# Patient Record
Sex: MALE | Race: WHITE | NOT HISPANIC OR LATINO | Employment: FULL TIME | URBAN - NONMETROPOLITAN AREA
[De-identification: names, ages, dates, MRNs, and addresses within clinical notes are randomized per-mention and may not be internally consistent; named-entity substitution may affect disease eponyms.]

---

## 2017-11-02 PROCEDURE — 87081 CULTURE SCREEN ONLY: CPT | Performed by: FAMILY MEDICINE

## 2018-06-12 ENCOUNTER — LAB (OUTPATIENT)
Dept: LAB | Facility: HOSPITAL | Age: 28
End: 2018-06-12

## 2018-06-12 ENCOUNTER — TRANSCRIBE ORDERS (OUTPATIENT)
Dept: ADMINISTRATIVE | Facility: HOSPITAL | Age: 28
End: 2018-06-12

## 2018-06-12 DIAGNOSIS — Z00.00 ROUTINE GENERAL MEDICAL EXAMINATION AT A HEALTH CARE FACILITY: ICD-10-CM

## 2018-06-12 DIAGNOSIS — Z00.00 ROUTINE GENERAL MEDICAL EXAMINATION AT A HEALTH CARE FACILITY: Primary | ICD-10-CM

## 2018-06-12 LAB
25(OH)D3 SERPL-MCNC: 22.7 NG/ML (ref 30–100)
ALBUMIN SERPL-MCNC: 4.5 G/DL (ref 3.5–5)
ALBUMIN/GLOB SERPL: 1.5 G/DL (ref 1.1–2.5)
ALP SERPL-CCNC: 58 U/L (ref 24–120)
ALT SERPL W P-5'-P-CCNC: 107 U/L (ref 0–54)
ANION GAP SERPL CALCULATED.3IONS-SCNC: 9 MMOL/L (ref 4–13)
AST SERPL-CCNC: 54 U/L (ref 7–45)
AUTO MIXED CELLS #: 0.8 10*3/MM3 (ref 0.1–2.6)
AUTO MIXED CELLS %: 10.1 % (ref 0.1–24)
BILIRUB SERPL-MCNC: 0.5 MG/DL (ref 0.1–1)
BILIRUB UR QL STRIP: NEGATIVE
BUN BLD-MCNC: 16 MG/DL (ref 5–21)
BUN/CREAT SERPL: 17.8
CALCIUM SPEC-SCNC: 9.6 MG/DL (ref 8.4–10.4)
CHLORIDE SERPL-SCNC: 105 MMOL/L (ref 98–110)
CHOLEST SERPL-MCNC: 191 MG/DL (ref 130–200)
CLARITY UR: CLEAR
CO2 SERPL-SCNC: 28 MMOL/L (ref 24–31)
COLOR UR: YELLOW
CREAT BLD-MCNC: 0.9 MG/DL (ref 0.5–1.4)
ERYTHROCYTE [DISTWIDTH] IN BLOOD BY AUTOMATED COUNT: 12.7 % (ref 12–15)
GFR SERPL CREATININE-BSD FRML MDRD: 101 ML/MIN/1.73
GLOBULIN UR ELPH-MCNC: 3 GM/DL
GLUCOSE BLD-MCNC: 100 MG/DL (ref 70–100)
GLUCOSE UR STRIP-MCNC: NEGATIVE MG/DL
HBA1C MFR BLD: 5.6 %
HCT VFR BLD AUTO: 45 % (ref 40–52)
HDLC SERPL-MCNC: 38 MG/DL
HGB BLD-MCNC: 15.6 G/DL (ref 14–18)
HGB UR QL STRIP.AUTO: NEGATIVE
KETONES UR QL STRIP: NEGATIVE
LDLC SERPL CALC-MCNC: 107 MG/DL (ref 0–99)
LDLC/HDLC SERPL: 2.82 {RATIO}
LEUKOCYTE ESTERASE UR QL STRIP.AUTO: NEGATIVE
LYMPHOCYTES # BLD AUTO: 3.4 10*3/MM3 (ref 0.8–7)
LYMPHOCYTES NFR BLD AUTO: 41.5 % (ref 15–45)
MCH RBC QN AUTO: 29.8 PG (ref 28–32)
MCHC RBC AUTO-ENTMCNC: 34.7 G/DL (ref 33–36)
MCV RBC AUTO: 86 FL (ref 82–95)
NEUTROPHILS # BLD AUTO: 3.9 10*3/MM3 (ref 1.5–8.3)
NEUTROPHILS NFR BLD AUTO: 48.4 % (ref 39–78)
NITRITE UR QL STRIP: NEGATIVE
PH UR STRIP.AUTO: 7 [PH] (ref 5–8)
PLATELET # BLD AUTO: 279 10*3/MM3 (ref 130–400)
PMV BLD AUTO: 9.3 FL (ref 6–12)
POTASSIUM BLD-SCNC: 4.6 MMOL/L (ref 3.5–5.3)
PROT SERPL-MCNC: 7.5 G/DL (ref 6.3–8.7)
PROT UR QL STRIP: NEGATIVE
RBC # BLD AUTO: 5.23 10*6/MM3 (ref 4.2–5.4)
SODIUM BLD-SCNC: 142 MMOL/L (ref 135–145)
SP GR UR STRIP: 1.02 (ref 1–1.03)
TRIGL SERPL-MCNC: 230 MG/DL (ref 0–149)
TSH SERPL DL<=0.05 MIU/L-ACNC: 1.27 MIU/ML (ref 0.47–4.68)
UROBILINOGEN UR QL STRIP: NORMAL
VLDLC SERPL-MCNC: 46 MG/DL
WBC NRBC COR # BLD: 8.1 10*3/MM3 (ref 4.8–10.8)

## 2018-06-12 PROCEDURE — 80053 COMPREHEN METABOLIC PANEL: CPT | Performed by: NURSE PRACTITIONER

## 2018-06-12 PROCEDURE — 36415 COLL VENOUS BLD VENIPUNCTURE: CPT

## 2018-06-12 PROCEDURE — 81003 URINALYSIS AUTO W/O SCOPE: CPT

## 2018-06-12 PROCEDURE — 80061 LIPID PANEL: CPT

## 2018-06-12 PROCEDURE — 85025 COMPLETE CBC W/AUTO DIFF WBC: CPT | Performed by: NURSE PRACTITIONER

## 2018-06-12 PROCEDURE — 83036 HEMOGLOBIN GLYCOSYLATED A1C: CPT

## 2018-06-12 PROCEDURE — 84443 ASSAY THYROID STIM HORMONE: CPT | Performed by: NURSE PRACTITIONER

## 2018-06-12 PROCEDURE — 82306 VITAMIN D 25 HYDROXY: CPT | Performed by: NURSE PRACTITIONER

## 2018-08-12 ENCOUNTER — APPOINTMENT (OUTPATIENT)
Dept: CT IMAGING | Age: 28
End: 2018-08-12

## 2018-08-12 ENCOUNTER — HOSPITAL ENCOUNTER (EMERGENCY)
Age: 28
Discharge: HOME | End: 2018-08-12
Attending: EMERGENCY MEDICINE

## 2018-08-12 VITALS
HEIGHT: 72 IN | DIASTOLIC BLOOD PRESSURE: 81 MMHG | RESPIRATION RATE: 17 BRPM | BODY MASS INDEX: 39.28 KG/M2 | SYSTOLIC BLOOD PRESSURE: 132 MMHG | HEART RATE: 89 BPM | OXYGEN SATURATION: 97 % | TEMPERATURE: 98.3 F | WEIGHT: 290 LBS

## 2018-08-12 DIAGNOSIS — S09.90XA INJURY OF HEAD, INITIAL ENCOUNTER: ICD-10-CM

## 2018-08-12 DIAGNOSIS — S01.81XA FACIAL LACERATION, INITIAL ENCOUNTER: Primary | ICD-10-CM
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
## 2018-08-12 PROCEDURE — 99284 EMERGENCY DEPT VISIT MOD MDM: CPT | Performed by: EMERGENCY MEDICINE

## 2018-08-12 PROCEDURE — 12011 RPR F/E/E/N/L/M 2.5 CM/<: CPT

## 2018-08-12 PROCEDURE — 12011 RPR F/E/E/N/L/M 2.5 CM/<: CPT | Performed by: EMERGENCY MEDICINE

## 2018-08-12 PROCEDURE — 99283 EMERGENCY DEPT VISIT LOW MDM: CPT

## 2018-08-12 PROCEDURE — 2500000003 HC RX 250 WO HCPCS: Performed by: EMERGENCY MEDICINE

## 2018-08-12 PROCEDURE — 6360000002 HC RX W HCPCS: Performed by: EMERGENCY MEDICINE

## 2018-08-12 PROCEDURE — 70450 CT HEAD/BRAIN W/O DYE: CPT

## 2018-08-12 RX ORDER — LIDOCAINE HYDROCHLORIDE 10 MG/ML
5 INJECTION, SOLUTION INFILTRATION; PERINEURAL ONCE
Status: COMPLETED | OUTPATIENT
Start: 2018-08-12 | End: 2018-08-12

## 2018-08-12 RX ORDER — ONDANSETRON 4 MG/1
4 TABLET, ORALLY DISINTEGRATING ORAL ONCE
Status: COMPLETED | OUTPATIENT
Start: 2018-08-12 | End: 2018-08-12

## 2018-08-12 RX ADMIN — ONDANSETRON 4 MG: 4 TABLET, ORALLY DISINTEGRATING ORAL at 05:29

## 2018-08-12 RX ADMIN — LIDOCAINE HYDROCHLORIDE 5 ML: 10 INJECTION, SOLUTION INFILTRATION; PERINEURAL at 05:28

## 2018-08-12 ASSESSMENT — ENCOUNTER SYMPTOMS
NAUSEA: 1
BACK PAIN: 0
SHORTNESS OF BREATH: 0

## 2018-08-12 ASSESSMENT — PAIN SCALES - GENERAL
PAINLEVEL_OUTOF10: 3
PAINLEVEL_OUTOF10: 3
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
## 2019-07-12 ENCOUNTER — APPOINTMENT (OUTPATIENT)
Dept: GENERAL RADIOLOGY | Age: 29
End: 2019-07-12

## 2019-07-12 ENCOUNTER — HOSPITAL ENCOUNTER (EMERGENCY)
Age: 29
Discharge: HOME | End: 2019-07-12
Attending: FAMILY MEDICINE

## 2019-07-12 VITALS
HEIGHT: 72 IN | OXYGEN SATURATION: 100 % | TEMPERATURE: 98.6 F | SYSTOLIC BLOOD PRESSURE: 135 MMHG | RESPIRATION RATE: 18 BRPM | HEART RATE: 83 BPM | WEIGHT: 285 LBS | DIASTOLIC BLOOD PRESSURE: 82 MMHG | BODY MASS INDEX: 38.6 KG/M2

## 2019-07-12 DIAGNOSIS — R09.1 PLEURISY: Primary | ICD-10-CM

## 2019-07-12 DIAGNOSIS — L40.9 PSORIASIS: ICD-10-CM

## 2019-07-12 LAB
ALBUMIN SERPL-MCNC: 4.4 G/DL (ref 3.5–5.2)
ALP BLD-CCNC: 74 U/L (ref 40–130)
ALT SERPL-CCNC: 52 U/L (ref 5–41)
ANION GAP SERPL CALCULATED.3IONS-SCNC: 12 MMOL/L (ref 7–19)
AST SERPL-CCNC: 29 U/L (ref 5–40)
BACTERIA: NEGATIVE /HPF
BASOPHILS ABSOLUTE: 0.1 K/UL (ref 0–0.2)
BASOPHILS RELATIVE PERCENT: 0.6 % (ref 0–1)
BILIRUB SERPL-MCNC: 0.6 MG/DL (ref 0.2–1.2)
BILIRUBIN URINE: NEGATIVE
BLOOD, URINE: NEGATIVE
BUN BLDV-MCNC: 14 MG/DL (ref 6–20)
C-REACTIVE PROTEIN: 0.69 MG/DL (ref 0–0.5)
CALCIUM SERPL-MCNC: 9.4 MG/DL (ref 8.6–10)
CHLORIDE BLD-SCNC: 103 MMOL/L (ref 98–111)
CLARITY: ABNORMAL
CO2: 24 MMOL/L (ref 22–29)
COLOR: YELLOW
CREAT SERPL-MCNC: 0.8 MG/DL (ref 0.5–1.2)
EOSINOPHILS ABSOLUTE: 0.3 K/UL (ref 0–0.6)
EOSINOPHILS RELATIVE PERCENT: 3.4 % (ref 0–5)
EPITHELIAL CELLS, UA: 2 /HPF (ref 0–5)
GFR NON-AFRICAN AMERICAN: >60
GLUCOSE BLD-MCNC: 112 MG/DL (ref 74–109)
GLUCOSE URINE: NEGATIVE MG/DL
HCT VFR BLD CALC: 46.1 % (ref 42–52)
HEMOGLOBIN: 15.4 G/DL (ref 14–18)
HYALINE CASTS: 14 /HPF (ref 0–8)
KETONES, URINE: NEGATIVE MG/DL
LEUKOCYTE ESTERASE, URINE: ABNORMAL
LYMPHOCYTES ABSOLUTE: 3.5 K/UL (ref 1.1–4.5)
LYMPHOCYTES RELATIVE PERCENT: 39.4 % (ref 20–40)
MCH RBC QN AUTO: 29 PG (ref 27–31)
MCHC RBC AUTO-ENTMCNC: 33.4 G/DL (ref 33–37)
MCV RBC AUTO: 86.8 FL (ref 80–94)
MONOCYTES ABSOLUTE: 0.7 K/UL (ref 0–0.9)
MONOCYTES RELATIVE PERCENT: 7.8 % (ref 0–10)
NEUTROPHILS ABSOLUTE: 4.3 K/UL (ref 1.5–7.5)
NEUTROPHILS RELATIVE PERCENT: 48.5 % (ref 50–65)
NITRITE, URINE: NEGATIVE
PDW BLD-RTO: 12.9 % (ref 11.5–14.5)
PH UA: 6 (ref 5–8)
PLATELET # BLD: 313 K/UL (ref 130–400)
PMV BLD AUTO: 9.8 FL (ref 9.4–12.4)
POTASSIUM SERPL-SCNC: 3.9 MMOL/L (ref 3.5–5)
PROTEIN UA: NEGATIVE MG/DL
RBC # BLD: 5.31 M/UL (ref 4.7–6.1)
RBC UA: 1 /HPF (ref 0–4)
SODIUM BLD-SCNC: 139 MMOL/L (ref 136–145)
SPECIFIC GRAVITY UA: 1.03 (ref 1–1.03)
T4 TOTAL: 7.7 UG/DL (ref 4.5–11.7)
TOTAL PROTEIN: 7.1 G/DL (ref 6.6–8.7)
TROPONIN: <0.01 NG/ML (ref 0–0.03)
TROPONIN: <0.01 NG/ML (ref 0–0.03)
TSH SERPL DL<=0.05 MIU/L-ACNC: 1.1 UIU/ML (ref 0.27–4.2)
URINE REFLEX TO CULTURE: YES
UROBILINOGEN, URINE: 0.2 E.U./DL
WBC # BLD: 8.9 K/UL (ref 4.8–10.8)
WBC UA: 8 /HPF (ref 0–5)

## 2019-07-12 PROCEDURE — 84484 ASSAY OF TROPONIN QUANT: CPT

## 2019-07-12 PROCEDURE — 99284 EMERGENCY DEPT VISIT MOD MDM: CPT

## 2019-07-12 PROCEDURE — 99285 EMERGENCY DEPT VISIT HI MDM: CPT | Performed by: FAMILY MEDICINE

## 2019-07-12 PROCEDURE — 84443 ASSAY THYROID STIM HORMONE: CPT

## 2019-07-12 PROCEDURE — 80053 COMPREHEN METABOLIC PANEL: CPT

## 2019-07-12 PROCEDURE — 81001 URINALYSIS AUTO W/SCOPE: CPT

## 2019-07-12 PROCEDURE — 87086 URINE CULTURE/COLONY COUNT: CPT

## 2019-07-12 PROCEDURE — 6360000002 HC RX W HCPCS: Performed by: FAMILY MEDICINE

## 2019-07-12 PROCEDURE — 2580000003 HC RX 258: Performed by: FAMILY MEDICINE

## 2019-07-12 PROCEDURE — 96375 TX/PRO/DX INJ NEW DRUG ADDON: CPT

## 2019-07-12 PROCEDURE — 84436 ASSAY OF TOTAL THYROXINE: CPT

## 2019-07-12 PROCEDURE — 6370000000 HC RX 637 (ALT 250 FOR IP): Performed by: FAMILY MEDICINE

## 2019-07-12 PROCEDURE — 86140 C-REACTIVE PROTEIN: CPT

## 2019-07-12 PROCEDURE — 93005 ELECTROCARDIOGRAM TRACING: CPT

## 2019-07-12 PROCEDURE — 36415 COLL VENOUS BLD VENIPUNCTURE: CPT

## 2019-07-12 PROCEDURE — 85025 COMPLETE CBC W/AUTO DIFF WBC: CPT

## 2019-07-12 PROCEDURE — 71045 X-RAY EXAM CHEST 1 VIEW: CPT

## 2019-07-12 PROCEDURE — 6360000002 HC RX W HCPCS

## 2019-07-12 PROCEDURE — 96374 THER/PROPH/DIAG INJ IV PUSH: CPT

## 2019-07-12 RX ORDER — ONDANSETRON 4 MG/1
4 TABLET, ORALLY DISINTEGRATING ORAL EVERY 8 HOURS PRN
Qty: 15 TABLET | Refills: 0 | Status: SHIPPED | OUTPATIENT
Start: 2019-07-12 | End: 2020-02-24

## 2019-07-12 RX ORDER — METHYLPREDNISOLONE SODIUM SUCCINATE 125 MG/2ML
125 INJECTION, POWDER, LYOPHILIZED, FOR SOLUTION INTRAMUSCULAR; INTRAVENOUS ONCE
Status: COMPLETED | OUTPATIENT
Start: 2019-07-12 | End: 2019-07-12

## 2019-07-12 RX ORDER — ONDANSETRON 2 MG/ML
INJECTION INTRAMUSCULAR; INTRAVENOUS
Status: COMPLETED
Start: 2019-07-12 | End: 2019-07-12

## 2019-07-12 RX ORDER — SODIUM CHLORIDE 9 MG/ML
INJECTION, SOLUTION INTRAVENOUS CONTINUOUS
Status: DISCONTINUED | OUTPATIENT
Start: 2019-07-12 | End: 2019-07-12 | Stop reason: HOSPADM

## 2019-07-12 RX ORDER — METHYLPREDNISOLONE 4 MG/1
TABLET ORAL
Qty: 1 KIT | Refills: 0 | Status: SHIPPED | OUTPATIENT
Start: 2019-07-12 | End: 2020-02-24

## 2019-07-12 RX ORDER — ONDANSETRON 2 MG/ML
4 INJECTION INTRAMUSCULAR; INTRAVENOUS ONCE
Status: COMPLETED | OUTPATIENT
Start: 2019-07-12 | End: 2019-07-12

## 2019-07-12 RX ADMIN — ONDANSETRON 4 MG: 2 INJECTION INTRAMUSCULAR; INTRAVENOUS at 10:21

## 2019-07-12 RX ADMIN — LIDOCAINE HYDROCHLORIDE: 20 SOLUTION ORAL; TOPICAL at 10:14

## 2019-07-12 RX ADMIN — METHYLPREDNISOLONE SODIUM SUCCINATE 125 MG: 125 INJECTION, POWDER, FOR SOLUTION INTRAMUSCULAR; INTRAVENOUS at 10:14

## 2019-07-12 RX ADMIN — SODIUM CHLORIDE: 9 INJECTION, SOLUTION INTRAVENOUS at 10:14

## 2019-07-12 ASSESSMENT — ENCOUNTER SYMPTOMS
ABDOMINAL PAIN: 0
COUGH: 0
DIARRHEA: 0
NAUSEA: 1
BACK PAIN: 0
WHEEZING: 0
SORE THROAT: 0
VOMITING: 0
COLOR CHANGE: 0
SHORTNESS OF BREATH: 0

## 2019-07-12 ASSESSMENT — PAIN SCALES - GENERAL: PAINLEVEL_OUTOF10: 2

## 2019-07-12 NOTE — ED PROVIDER NOTES
07/12/19 1001 07/12/19 1101 07/12/19 1201   BP: 129/85 131/73 131/81 119/73   Pulse: 77 80 73 103   Resp: 18 21 19    Temp: 98.6 °F (37 °C)      TempSrc: Oral      SpO2: 100% 100% 100% 96%   Weight: 285 lb (129.3 kg)      Height: 6' (1.829 m)          MDM    Reassessment  12:42 PM patient symptoms have resolved we will discharge home with a diagnosis of pleurisy psoriasis treating both with a steroid pack follow-up with primary care    CONSULTS:  None    PROCEDURES:  Unless otherwise noted below, none     Procedures    FINAL IMPRESSION      1. Pleurisy    2. Psoriasis          DISPOSITION/PLAN   DISPOSITION Decision To Discharge 07/12/2019 12:40:22 PM      PATIENT REFERRED TO:  Amee Mauricio MD  [address] [place] [id number]  [phone]      As needed      DISCHARGE MEDICATIONS:  New Prescriptions    METHYLPREDNISOLONE (MEDROL, LESVIA,) 4 MG TABLET    Take by mouth.     ONDANSETRON (ZOFRAN ODT) 4 MG DISINTEGRATING TABLET    Take 1 tablet by mouth every 8 hours as needed for Nausea or Vomiting          (Please note that portions of this note were completed with a voice recognition program.  Efforts were made to edit thedictations but occasionally words are mis-transcribed.)    Wilmer Somers MD (electronically signed)  Attending Emergency Physician         Oneda Goodell, MD  07/12/19 5282

## 2019-07-14 LAB
EKG P AXIS: 3 DEGREES
EKG P AXIS: 3 DEGREES
EKG P-R INTERVAL: 150 MS
EKG P-R INTERVAL: 156 MS
EKG Q-T INTERVAL: 376 MS
EKG Q-T INTERVAL: 384 MS
EKG QRS DURATION: 94 MS
EKG QRS DURATION: 96 MS
EKG QTC CALCULATION (BAZETT): 407 MS
EKG QTC CALCULATION (BAZETT): 420 MS
EKG T AXIS: 55 DEGREES
EKG T AXIS: 67 DEGREES
URINE CULTURE, ROUTINE: NORMAL

## 2020-02-24 ENCOUNTER — OFFICE VISIT (OUTPATIENT)
Dept: PRIMARY CARE CLINIC | Age: 30
End: 2020-02-24
Payer: MEDICAID

## 2020-02-24 VITALS
SYSTOLIC BLOOD PRESSURE: 138 MMHG | TEMPERATURE: 98.4 F | DIASTOLIC BLOOD PRESSURE: 86 MMHG | WEIGHT: 315 LBS | BODY MASS INDEX: 42.66 KG/M2 | HEIGHT: 72 IN | OXYGEN SATURATION: 97 % | HEART RATE: 73 BPM

## 2020-02-24 PROCEDURE — 99203 OFFICE O/P NEW LOW 30 MIN: CPT | Performed by: NURSE PRACTITIONER

## 2020-02-24 ASSESSMENT — PATIENT HEALTH QUESTIONNAIRE - PHQ9
2. FEELING DOWN, DEPRESSED OR HOPELESS: 0
SUM OF ALL RESPONSES TO PHQ QUESTIONS 1-9: 0
SUM OF ALL RESPONSES TO PHQ QUESTIONS 1-9: 0
SUM OF ALL RESPONSES TO PHQ9 QUESTIONS 1 & 2: 0
1. LITTLE INTEREST OR PLEASURE IN DOING THINGS: 0

## 2020-02-24 NOTE — PROGRESS NOTES
Medical History:   Diagnosis Date    Seizure [facility])        No current outpatient medications on file. No current facility-administered medications for this visit. No Known Allergies    Past Surgical History:   Procedure Laterality Date    TONSILLECTOMY AND ADENOIDECTOMY      TYMPANOSTOMY TUBE PLACEMENT Bilateral 1998       Social History     Tobacco Use    Smoking status: Never Smoker    Smokeless tobacco: Never Used   Substance Use Topics    Alcohol use: Yes     Comment: occ    Drug use: No       Family History   Problem Relation Age of Onset    High Blood Pressure Father     High Cholesterol Father     Alcohol Abuse Father     Cancer Brother 29        testicular    Diabetes Maternal Uncle     Pancreatic Cancer Maternal Grandmother        /86   Pulse 73   Temp 98.4 °F (36.9 °C)   Ht 6' (1.829 m)   Wt (!) 318 lb (144.2 kg)   SpO2 97%   BMI 43.13 kg/m²     Physical Exam  Vitals signs and nursing note reviewed. Constitutional:       General: He is not in acute distress. Appearance: He is well-developed. He is obese. He is not diaphoretic. HENT:      Head: Normocephalic. Right Ear: Tympanic membrane, ear canal and external ear normal. There is no impacted cerumen. Left Ear: Tympanic membrane, ear canal and external ear normal. There is no impacted cerumen. Nose: Nose normal. No congestion. Mouth/Throat:      Mouth: Mucous membranes are moist.      Pharynx: Oropharynx is clear. No oropharyngeal exudate. Eyes:      General:         Right eye: No discharge. Left eye: No discharge. Conjunctiva/sclera: Conjunctivae normal.      Pupils: Pupils are equal, round, and reactive to light. Neck:      Musculoskeletal: Normal range of motion. Trachea: No tracheal deviation. Cardiovascular:      Rate and Rhythm: Normal rate and regular rhythm. Pulses: Normal pulses. Heart sounds: Normal heart sounds. No murmur.    Pulmonary:      Effort: Pulmonary effort is normal. No respiratory distress. Breath sounds: Normal breath sounds. No stridor. Abdominal:      General: Bowel sounds are normal.      Palpations: Abdomen is soft. Tenderness: There is no abdominal tenderness. Musculoskeletal: Normal range of motion. General: No tenderness or deformity. Lymphadenopathy:      Cervical: No cervical adenopathy. Skin:     General: Skin is warm and dry. Capillary Refill: Capillary refill takes less than 2 seconds. Findings: No rash. Neurological:      Mental Status: He is alert and oriented to person, place, and time. Cranial Nerves: No cranial nerve deficit. Psychiatric:         Mood and Affect: Mood is anxious. Behavior: Behavior normal.         Thought Content: Thought content normal.         Judgment: Judgment normal.         Assessment:    ICD-10-CM    1. Encounter to establish care Z76.89    2. Fatigue, unspecified type R53.83 CBC Auto Differential     Comprehensive Metabolic Panel     TSH without Reflex   3. Encounter for screening for diabetes mellitus Z13.1 Hemoglobin A1C   4. Encounter for lipid screening for cardiovascular disease Z13.220 Lipid Panel    Z13.6    5. History of migraine Z86.69    6. History of seizures Z87.898        Plan:   1. Encounter to establish care    2. Fatigue, unspecified type  - CBC Auto Differential; Future  - Comprehensive Metabolic Panel; Future  - TSH without Reflex; Future    3. Encounter for screening for diabetes mellitus  - Hemoglobin A1C; Future    4. Encounter for lipid screening for cardiovascular disease  - Lipid Panel; Future    5. History of migraine  - will follow-up about this at next visit     6. History of seizures  - Patient would like to hold off on referral at this point;  Encouraged patient to follow-up; will reevaluate at next visit     Over 50% of the total visit time of 30 minutes was spent on counseling and or coordination of care of establish care,

## 2020-02-25 ASSESSMENT — ENCOUNTER SYMPTOMS
RESPIRATORY NEGATIVE: 1
EYES NEGATIVE: 1
GASTROINTESTINAL NEGATIVE: 1
ALLERGIC/IMMUNOLOGIC NEGATIVE: 1

## 2020-02-27 DIAGNOSIS — R53.83 FATIGUE, UNSPECIFIED TYPE: ICD-10-CM

## 2020-02-27 DIAGNOSIS — Z13.1 ENCOUNTER FOR SCREENING FOR DIABETES MELLITUS: ICD-10-CM

## 2020-02-27 DIAGNOSIS — Z13.220 ENCOUNTER FOR LIPID SCREENING FOR CARDIOVASCULAR DISEASE: ICD-10-CM

## 2020-02-27 DIAGNOSIS — Z13.6 ENCOUNTER FOR LIPID SCREENING FOR CARDIOVASCULAR DISEASE: ICD-10-CM

## 2020-02-27 LAB
ALBUMIN SERPL-MCNC: 4.8 G/DL (ref 3.5–5.2)
ALP BLD-CCNC: 64 U/L (ref 40–130)
ALT SERPL-CCNC: 60 U/L (ref 5–41)
ANION GAP SERPL CALCULATED.3IONS-SCNC: 15 MMOL/L (ref 7–19)
AST SERPL-CCNC: 32 U/L (ref 5–40)
BASOPHILS ABSOLUTE: 0.1 K/UL (ref 0–0.2)
BASOPHILS RELATIVE PERCENT: 0.7 % (ref 0–1)
BILIRUB SERPL-MCNC: 0.7 MG/DL (ref 0.2–1.2)
BUN BLDV-MCNC: 17 MG/DL (ref 6–20)
CALCIUM SERPL-MCNC: 9.3 MG/DL (ref 8.6–10)
CHLORIDE BLD-SCNC: 101 MMOL/L (ref 98–111)
CHOLESTEROL, TOTAL: 166 MG/DL (ref 160–199)
CO2: 25 MMOL/L (ref 22–29)
CREAT SERPL-MCNC: 0.8 MG/DL (ref 0.5–1.2)
EOSINOPHILS ABSOLUTE: 0.2 K/UL (ref 0–0.6)
EOSINOPHILS RELATIVE PERCENT: 2.9 % (ref 0–5)
GFR NON-AFRICAN AMERICAN: >60
GLUCOSE BLD-MCNC: 91 MG/DL (ref 74–109)
HBA1C MFR BLD: 5.7 % (ref 4–6)
HCT VFR BLD CALC: 47.4 % (ref 42–52)
HDLC SERPL-MCNC: 33 MG/DL (ref 55–121)
HEMOGLOBIN: 16.2 G/DL (ref 14–18)
IMMATURE GRANULOCYTES #: 0 K/UL
LDL CHOLESTEROL CALCULATED: 89 MG/DL
LYMPHOCYTES ABSOLUTE: 4 K/UL (ref 1.1–4.5)
LYMPHOCYTES RELATIVE PERCENT: 48.4 % (ref 20–40)
MCH RBC QN AUTO: 29.7 PG (ref 27–31)
MCHC RBC AUTO-ENTMCNC: 34.2 G/DL (ref 33–37)
MCV RBC AUTO: 87 FL (ref 80–94)
MONOCYTES ABSOLUTE: 0.6 K/UL (ref 0–0.9)
MONOCYTES RELATIVE PERCENT: 7.2 % (ref 0–10)
NEUTROPHILS ABSOLUTE: 3.4 K/UL (ref 1.5–7.5)
NEUTROPHILS RELATIVE PERCENT: 40.6 % (ref 50–65)
PDW BLD-RTO: 12.7 % (ref 11.5–14.5)
PLATELET # BLD: 293 K/UL (ref 130–400)
PMV BLD AUTO: 9.9 FL (ref 9.4–12.4)
POTASSIUM SERPL-SCNC: 3.9 MMOL/L (ref 3.5–5)
RBC # BLD: 5.45 M/UL (ref 4.7–6.1)
SODIUM BLD-SCNC: 141 MMOL/L (ref 136–145)
TOTAL PROTEIN: 7.1 G/DL (ref 6.6–8.7)
TRIGL SERPL-MCNC: 222 MG/DL (ref 0–149)
TSH SERPL DL<=0.05 MIU/L-ACNC: 1.4 UIU/ML (ref 0.27–4.2)
WBC # BLD: 8.3 K/UL (ref 4.8–10.8)

## 2020-03-02 ENCOUNTER — TELEPHONE (OUTPATIENT)
Dept: PRIMARY CARE CLINIC | Age: 30
End: 2020-03-02

## 2020-03-02 ENCOUNTER — OFFICE VISIT (OUTPATIENT)
Dept: PRIMARY CARE CLINIC | Age: 30
End: 2020-03-02
Payer: MEDICAID

## 2020-03-02 VITALS
RESPIRATION RATE: 16 BRPM | WEIGHT: 314 LBS | OXYGEN SATURATION: 95 % | TEMPERATURE: 98.2 F | HEIGHT: 72 IN | BODY MASS INDEX: 42.53 KG/M2 | DIASTOLIC BLOOD PRESSURE: 86 MMHG | HEART RATE: 90 BPM | SYSTOLIC BLOOD PRESSURE: 130 MMHG

## 2020-03-02 PROCEDURE — 99214 OFFICE O/P EST MOD 30 MIN: CPT | Performed by: NURSE PRACTITIONER

## 2020-03-02 RX ORDER — METHYLPREDNISOLONE 4 MG/1
TABLET ORAL
Qty: 1 KIT | Refills: 0 | Status: CANCELLED | OUTPATIENT
Start: 2020-03-02 | End: 2020-03-08

## 2020-03-02 RX ORDER — METHYLPREDNISOLONE 4 MG/1
TABLET ORAL
Qty: 1 KIT | Refills: 0 | Status: SHIPPED | OUTPATIENT
Start: 2020-03-02 | End: 2020-03-08

## 2020-03-02 RX ORDER — AMOXICILLIN AND CLAVULANATE POTASSIUM 875; 125 MG/1; MG/1
1 TABLET, FILM COATED ORAL 2 TIMES DAILY
Qty: 20 TABLET | Refills: 0 | Status: SHIPPED | OUTPATIENT
Start: 2020-03-02 | End: 2020-03-12

## 2020-03-02 RX ORDER — AMOXICILLIN AND CLAVULANATE POTASSIUM 875; 125 MG/1; MG/1
1 TABLET, FILM COATED ORAL 2 TIMES DAILY
Qty: 20 TABLET | Refills: 0 | Status: CANCELLED | OUTPATIENT
Start: 2020-03-02 | End: 2020-03-12

## 2020-03-02 ASSESSMENT — ENCOUNTER SYMPTOMS
RHINORRHEA: 1
EYE REDNESS: 0
VOMITING: 0
BLURRED VISION: 0
SORE THROAT: 1
VISUAL CHANGE: 0
SCALP TENDERNESS: 0
SWOLLEN GLANDS: 1
COUGH: 1
ABDOMINAL PAIN: 0
BACK PAIN: 0
DIARRHEA: 0
SINUS PRESSURE: 1
PHOTOPHOBIA: 0
EYE PAIN: 0
NAUSEA: 0
EYE WATERING: 0
FACIAL SWEATING: 0

## 2020-03-02 NOTE — PATIENT INSTRUCTIONS
Thank you for enrolling in [address] Please follow the instructions below to securely access your online medical record. [facility] allows you to send messages to your doctor, view your test results, renew your prescriptions, schedule appointments, and more. How Do I Sign Up? 1. In your Internet browser, go to [email or web address]  2. Click on the Sign Up Now link in the Sign In box. You will see the New Member Sign Up page. 3. Enter your [facility] Access Code exactly as it appears below. You will not need to use this code after youve completed the sign-up process. If you do not sign up before the expiration date, you must request a new code. [facility] Access Code: [email or web address]  Expires: 4/9/2020  2:50 PM    4. Enter your Social Security Number ([id number]) and Date of Birth (mm/dd/yyyy) as indicated and click Submit. You will be taken to the next sign-up page. 5. Create a [facility] ID. This will be your [facility] login ID and cannot be changed, so think of one that is secure and easy to remember. 6. Create a [facility] password. You can change your password at any time. 7. Enter your Password Reset Question and Answer. This can be used at a later time if you forget your password. 8. Enter your e-mail address. You will receive e-mail notification when new information is available in [address] 9. Click Sign Up. You can now view your medical record. Additional Information  If you have questions, please contact the physician practice where you receive care. Remember, [facility] is NOT to be used for urgent needs. For medical emergencies, dial 911. For questions regarding your [facility] account call [phone]. If you have a clinical question, please call your doctor's office.

## 2020-03-02 NOTE — PROGRESS NOTES
Onset of symptoms was 2 days ago, rapidly worsening since that time. Associated symptoms include congestion, cough described as non-productive, without wheezing, dyspnea or hemoptysis, facial pain, headache described as dull/ aching, low grade fever, nasal congestion, post nasal drip, purulent nasal discharge and sinus pressure, which have been present for 2 days . He is drinking moderate amounts of fluids  Patient's medications, allergies, past medical, surgical, social and family histories were reviewed and updated as appropriate. Review of Systems  Constitutional: positive for fatigue and fevers  Eyes: negative  Ears, nose, mouth, throat, and face: positive for earaches, hearing loss, nasal congestion and sore throat  Respiratory: negative  Cardiovascular: negative  Gastrointestinal: negative  Genitourinary:negative  Hematologic/lymphatic: negative  Musculoskeletal:negative  Neurological: negative  Behavioral/Psych: negative  Endocrine: positive for diabetic symptoms including none and polyuria  Allergic/Immunologic: positive for environmental allergies      Objective:      /86   Pulse 90   Temp 98.2 °F (36.8 °C) (Temporal)   Resp 16   Ht 6' (1.829 m)   Wt (!) 314 lb (142.4 kg)   SpO2 95%   BMI 42.59 kg/m²   General appearance: alert, appears stated age and cooperative  Head: Normocephalic, without obvious abnormality, atraumatic  Eyes: conjunctivae/corneas clear. PERRL, EOM's intact. Fundi benign. Ears: normal TM and external ear canal right ear and abnormal TM left ear - retracted  Nose: purulent discharge, moderate congestion, sinus tenderness bilateral, moderate maxillary sinus tenderness bilateral, mild frontal sinus tenderness bilateral  Throat: abnormal findings: moderate oropharyngeal erythema  Neck: no adenopathy, no carotid bruit, no JVD, supple, symmetrical, trachea midline and thyroid not enlarged, symmetric, no tenderness/mass/nodules  Back: symmetric, no curvature.  ROM normal. No CVA tenderness. Lungs: clear to auscultation bilaterally  Chest wall: no tenderness  Heart: regular rate and rhythm, S1, S2 normal, no murmur, click, rub or gallop  Abdomen: soft, non-tender; bowel sounds normal; no masses,  no organomegaly  Extremities: extremities normal, atraumatic, no cyanosis or edema  Skin: Skin color, texture, turgor normal. No rashes or lesions  Lymph nodes: Cervical adenopathy: present  Neurologic: Grossly normal      Assessment:      Acute left otitis media  Allergic rhinitis    Borderline hyperglycemia   Plan:      1. Treatment:  Augmentin  2. OTC analgesics, fluids, rest.   3.  Follow up with PCP in 3 months for follow-up of previous visit or PRN for other needs. 4. Decrease processed foods and simple sugars. Will recheck A1c in 3 months. If not improved, add metformin. Over 50% of the total visit time of 25 minutes was spent on counseling and or coordination of care of acute left otitis media, allergic rhinitis, borderline hyperglycemia, medications, and follow-up.

## 2020-03-18 ENCOUNTER — APPOINTMENT (OUTPATIENT)
Dept: GENERAL RADIOLOGY | Age: 30
End: 2020-03-18
Payer: MEDICAID

## 2020-03-18 ENCOUNTER — HOSPITAL ENCOUNTER (EMERGENCY)
Age: 30
Discharge: HOME | End: 2020-03-19
Attending: EMERGENCY MEDICINE
Payer: MEDICAID

## 2020-03-18 LAB
ALBUMIN SERPL-MCNC: 4.7 G/DL (ref 3.5–5.2)
ALP BLD-CCNC: 59 U/L (ref 40–130)
ALT SERPL-CCNC: 52 U/L (ref 5–41)
AMPHETAMINE SCREEN, URINE: NEGATIVE
ANION GAP SERPL CALCULATED.3IONS-SCNC: 17 MMOL/L (ref 7–19)
AST SERPL-CCNC: 32 U/L (ref 5–40)
BARBITURATE SCREEN URINE: NEGATIVE
BENZODIAZEPINE SCREEN, URINE: NEGATIVE
BILIRUB SERPL-MCNC: 0.3 MG/DL (ref 0.2–1.2)
BUN BLDV-MCNC: 19 MG/DL (ref 6–20)
CALCIUM SERPL-MCNC: 9.4 MG/DL (ref 8.6–10)
CANNABINOID SCREEN URINE: NEGATIVE
CHLORIDE BLD-SCNC: 103 MMOL/L (ref 98–111)
CO2: 21 MMOL/L (ref 22–29)
COCAINE METABOLITE SCREEN URINE: NEGATIVE
CREAT SERPL-MCNC: 1 MG/DL (ref 0.5–1.2)
ETHANOL: <10 MG/DL (ref 0–0.08)
GFR NON-AFRICAN AMERICAN: >60
GLUCOSE BLD-MCNC: 95 MG/DL (ref 74–109)
Lab: NORMAL
OPIATE SCREEN URINE: NEGATIVE
POTASSIUM SERPL-SCNC: 3.7 MMOL/L (ref 3.5–5)
SODIUM BLD-SCNC: 141 MMOL/L (ref 136–145)
TOTAL PROTEIN: 7.7 G/DL (ref 6.6–8.7)

## 2020-03-18 PROCEDURE — 71045 X-RAY EXAM CHEST 1 VIEW: CPT

## 2020-03-18 PROCEDURE — 93005 ELECTROCARDIOGRAM TRACING: CPT | Performed by: EMERGENCY MEDICINE

## 2020-03-18 PROCEDURE — 36415 COLL VENOUS BLD VENIPUNCTURE: CPT

## 2020-03-18 PROCEDURE — 80053 COMPREHEN METABOLIC PANEL: CPT

## 2020-03-18 PROCEDURE — 85025 COMPLETE CBC W/AUTO DIFF WBC: CPT

## 2020-03-18 PROCEDURE — 99285 EMERGENCY DEPT VISIT HI MDM: CPT

## 2020-03-18 PROCEDURE — G0480 DRUG TEST DEF 1-7 CLASSES: HCPCS

## 2020-03-18 ASSESSMENT — PAIN SCALES - GENERAL: PAINLEVEL_OUTOF10: 6

## 2020-03-18 ASSESSMENT — PAIN DESCRIPTION - DESCRIPTORS: DESCRIPTORS: SHARP;PRESSURE

## 2020-03-18 ASSESSMENT — PAIN DESCRIPTION - FREQUENCY: FREQUENCY: CONTINUOUS

## 2020-03-18 ASSESSMENT — PAIN DESCRIPTION - PAIN TYPE: TYPE: ACUTE PAIN

## 2020-03-18 ASSESSMENT — PAIN DESCRIPTION - LOCATION: LOCATION: CHEST

## 2020-03-19 VITALS
WEIGHT: 300 LBS | TEMPERATURE: 98 F | OXYGEN SATURATION: 98 % | HEART RATE: 78 BPM | BODY MASS INDEX: 40.69 KG/M2 | DIASTOLIC BLOOD PRESSURE: 78 MMHG | RESPIRATION RATE: 20 BRPM | SYSTOLIC BLOOD PRESSURE: 122 MMHG

## 2020-03-19 LAB
ATYPICAL LYMPHOCYTE RELATIVE PERCENT: 3 % (ref 0–8)
BACTERIA: NEGATIVE /HPF
BANDED NEUTROPHILS RELATIVE PERCENT: 1 % (ref 0–5)
BASOPHILS ABSOLUTE: 0.1 K/UL (ref 0–0.2)
BASOPHILS RELATIVE PERCENT: 1 % (ref 0–1)
BILIRUBIN URINE: NEGATIVE
BLOOD, URINE: ABNORMAL
CLARITY: ABNORMAL
COLOR: YELLOW
EKG P AXIS: 26 DEGREES
EKG P-R INTERVAL: 166 MS
EKG Q-T INTERVAL: 360 MS
EKG QRS DURATION: 94 MS
EKG QTC CALCULATION (BAZETT): 403 MS
EKG T AXIS: 74 DEGREES
EOSINOPHILS ABSOLUTE: 0.13 K/UL (ref 0–0.6)
EOSINOPHILS RELATIVE PERCENT: 1 % (ref 0–5)
EPITHELIAL CELLS, UA: 0 /HPF (ref 0–5)
GLUCOSE URINE: NEGATIVE MG/DL
HCT VFR BLD CALC: 46.9 % (ref 42–52)
HEMOGLOBIN: 15.8 G/DL (ref 14–18)
HYALINE CASTS: 1 /HPF (ref 0–8)
IMMATURE GRANULOCYTES #: 0.1 K/UL
KETONES, URINE: NEGATIVE MG/DL
LEUKOCYTE ESTERASE, URINE: NEGATIVE
LYMPHOCYTES ABSOLUTE: 7.5 K/UL (ref 1.1–4.5)
LYMPHOCYTES RELATIVE PERCENT: 56 % (ref 20–40)
MCH RBC QN AUTO: 29.8 PG (ref 27–31)
MCHC RBC AUTO-ENTMCNC: 33.7 G/DL (ref 33–37)
MCV RBC AUTO: 88.5 FL (ref 80–94)
MONOCYTES ABSOLUTE: 0.1 K/UL (ref 0–0.9)
MONOCYTES RELATIVE PERCENT: 1 % (ref 0–10)
NEUTROPHILS ABSOLUTE: 4.8 K/UL (ref 1.5–7.5)
NEUTROPHILS RELATIVE PERCENT: 37 % (ref 50–65)
NITRITE, URINE: NEGATIVE
PDW BLD-RTO: 12.7 % (ref 11.5–14.5)
PH UA: 7 (ref 5–8)
PLATELET # BLD: 336 K/UL (ref 130–400)
PLATELET SLIDE REVIEW: ADEQUATE
PMV BLD AUTO: 10.3 FL (ref 9.4–12.4)
PROTEIN UA: NEGATIVE MG/DL
RBC # BLD: 5.3 M/UL (ref 4.7–6.1)
RBC # BLD: NORMAL 10*6/UL
RBC UA: 0 /HPF (ref 0–4)
SPECIFIC GRAVITY UA: 1.02 (ref 1–1.03)
URINE REFLEX TO CULTURE: ABNORMAL
UROBILINOGEN, URINE: 1 E.U./DL
WBC # BLD: 12.7 K/UL (ref 4.8–10.8)
WBC UA: 0 /HPF (ref 0–5)

## 2020-03-19 PROCEDURE — 96374 THER/PROPH/DIAG INJ IV PUSH: CPT

## 2020-03-19 PROCEDURE — 6360000002 HC RX W HCPCS: Performed by: EMERGENCY MEDICINE

## 2020-03-19 PROCEDURE — 81001 URINALYSIS AUTO W/SCOPE: CPT

## 2020-03-19 PROCEDURE — 96375 TX/PRO/DX INJ NEW DRUG ADDON: CPT

## 2020-03-19 PROCEDURE — 80307 DRUG TEST PRSMV CHEM ANLYZR: CPT

## 2020-03-19 RX ORDER — KETOROLAC TROMETHAMINE 30 MG/ML
30 INJECTION, SOLUTION INTRAMUSCULAR; INTRAVENOUS ONCE
Status: COMPLETED | OUTPATIENT
Start: 2020-03-19 | End: 2020-03-19

## 2020-03-19 RX ORDER — DEXAMETHASONE SODIUM PHOSPHATE 10 MG/ML
10 INJECTION, SOLUTION INTRAMUSCULAR; INTRAVENOUS ONCE
Status: COMPLETED | OUTPATIENT
Start: 2020-03-19 | End: 2020-03-19

## 2020-03-19 RX ADMIN — DEXAMETHASONE SODIUM PHOSPHATE 10 MG: 10 INJECTION, SOLUTION INTRAMUSCULAR; INTRAVENOUS at 00:51

## 2020-03-19 RX ADMIN — KETOROLAC TROMETHAMINE 30 MG: 30 INJECTION, SOLUTION INTRAMUSCULAR at 00:51

## 2020-03-19 ASSESSMENT — PAIN SCALES - GENERAL
PAINLEVEL_OUTOF10: 1
PAINLEVEL_OUTOF10: 5

## 2020-03-19 NOTE — ED PROVIDER NOTES
[facility]  eMERGENCY dEPARTMENT eNCOUnter      Pt Name: Elizabeth King  MRN: [id number]  [place] 1990  Date of evaluation: 3/18/2020  Provider: Ponce Lowe MD    [address] [place] [address]       Chief Complaint   Patient presents with    Chest Pain    Seizures         HISTORY OF PRESENT ILLNESS   (Location/Symptom, Timing/Onset,Context/Setting, Quality, Duration, Modifying Factors, Severity)  Note limiting factors. Elizabeth King is a 34 y.o. male who presents to the emergency department for CP. When he was brought back by RN to be placed in rm, pt appeared to have a SZ. Per RN it lasted for approx 20 sec and then pt did not appear to be significantly post-ictal.  He was able to answer questions after the episode. Pt then began to have another episode where he was grabbing the bed and became very emotional.  He kept stating that he did not want to be here and that there had been \"sick people\" in his room. Pt was able to tell me that he has a hx of SZ and that he has not been on medication in several yrs. The episode that he was having was not epileptic in nature. Patient's girlfriend arrived and explained to me that she found patient on the floor in the kitchen at home this evening. She had left for approximately an hour. When she returned she found him \"altered\" and on the kitchen floor. She attempted to sit there with him for a bit and get him up, but patient had a difficult time with this. She was finally able to get him up and into the car to bring him    HPI    NursingNotes were reviewed.     REVIEW OF SYSTEMS    (2-9 systems for level 4, 10 or more for level 5)     Review of Systems   Unable to perform ROS: Psychiatric disorder            PAST MEDICALHISTORY     Past Medical History:   Diagnosis Date    Seizure [facility])          SURGICAL HISTORY       Past Surgical History:   Procedure Laterality Date    TONSILLECTOMY AND ADENOIDECTOMY      TYMPANOSTOMY TUBE PLACEMENT Bilateral 1998 CURRENT MEDICATIONS     Previous Medications    No medications on file       ALLERGIES     Patient has no known allergies.     FAMILY HISTORY       Family History   Problem Relation Age of Onset    High Blood Pressure Father     High Cholesterol Father     Alcohol Abuse Father     Cancer Brother 29        testicular    Diabetes Maternal Uncle     Pancreatic Cancer Maternal Grandmother           SOCIAL HISTORY       Social History     Socioeconomic History    Marital status:      Spouse name: None    Number of children: None    Years of education: None    Highest education level: None   Occupational History    None   Social Needs    Financial resource strain: None    Food insecurity     Worry: None     Inability: None    Transportation needs     Medical: None     Non-medical: None   Tobacco Use    Smoking status: Never Smoker    Smokeless tobacco: Never Used   Substance and Sexual Activity    Alcohol use: Yes     Comment: occ    Drug use: No    Sexual activity: None   Lifestyle    Physical activity     Days per week: None     Minutes per session: None    Stress: None   Relationships    Social connections     Talks on phone: None     Gets together: None     Attends Oriental orthodox service: None     Active member of club or organization: None     Attends meetings of clubs or organizations: None     Relationship status: None    Intimate partner violence     Fear of current or ex partner: None     Emotionally abused: None     Physically abused: None     Forced sexual activity: None   Other Topics Concern    None   Social History Narrative    None       SCREENINGS    [place] Coma Scale  Eye Opening: Spontaneous  Best Verbal Response: Oriented  Best Motor Response: Obeys commands  [place] Coma Scale Score: 15        PHYSICAL EXAM    (up to 7 for level 4, 8 or more for level 5)     ED Triage Vitals [03/18/20 2220]   BP Temp Temp Source Pulse Resp SpO2 Height Weight   (!) 142/84 98.4 °F

## 2020-03-19 NOTE — ED NOTES
Bed: 09  Expected date:   Expected time:   Means of arrival:   Comments:  Hi Galvan RN  03/18/20 1232

## 2020-04-29 ENCOUNTER — HOSPITAL ENCOUNTER (EMERGENCY)
Age: 30
Discharge: HOME | End: 2020-04-29
Attending: EMERGENCY MEDICINE
Payer: MEDICAID

## 2020-04-29 ENCOUNTER — APPOINTMENT (OUTPATIENT)
Dept: GENERAL RADIOLOGY | Age: 30
End: 2020-04-29
Payer: MEDICAID

## 2020-04-29 VITALS
RESPIRATION RATE: 18 BRPM | HEIGHT: 72 IN | WEIGHT: 300 LBS | DIASTOLIC BLOOD PRESSURE: 69 MMHG | SYSTOLIC BLOOD PRESSURE: 129 MMHG | TEMPERATURE: 98.7 F | OXYGEN SATURATION: 96 % | BODY MASS INDEX: 40.63 KG/M2 | HEART RATE: 73 BPM

## 2020-04-29 LAB
ALBUMIN SERPL-MCNC: 4.5 G/DL (ref 3.5–5.2)
ALP BLD-CCNC: 56 U/L (ref 40–130)
ALT SERPL-CCNC: 45 U/L (ref 5–41)
ANION GAP SERPL CALCULATED.3IONS-SCNC: 11 MMOL/L (ref 7–19)
AST SERPL-CCNC: 28 U/L (ref 5–40)
BASOPHILS ABSOLUTE: 0 K/UL (ref 0–0.2)
BASOPHILS RELATIVE PERCENT: 0.5 % (ref 0–1)
BILIRUB SERPL-MCNC: 0.5 MG/DL (ref 0.2–1.2)
BUN BLDV-MCNC: 11 MG/DL (ref 6–20)
CALCIUM SERPL-MCNC: 9.3 MG/DL (ref 8.6–10)
CHLORIDE BLD-SCNC: 106 MMOL/L (ref 98–111)
CO2: 24 MMOL/L (ref 22–29)
CREAT SERPL-MCNC: 0.7 MG/DL (ref 0.5–1.2)
EOSINOPHILS ABSOLUTE: 0.3 K/UL (ref 0–0.6)
EOSINOPHILS RELATIVE PERCENT: 3 % (ref 0–5)
GFR NON-AFRICAN AMERICAN: >60
GLUCOSE BLD-MCNC: 111 MG/DL (ref 74–109)
HCT VFR BLD CALC: 46.2 % (ref 42–52)
HEMOGLOBIN: 16 G/DL (ref 14–18)
IMMATURE GRANULOCYTES #: 0 K/UL
LIPASE: 40 U/L (ref 13–60)
LYMPHOCYTES ABSOLUTE: 3.7 K/UL (ref 1.1–4.5)
LYMPHOCYTES RELATIVE PERCENT: 41.5 % (ref 20–40)
MCH RBC QN AUTO: 29.7 PG (ref 27–31)
MCHC RBC AUTO-ENTMCNC: 34.6 G/DL (ref 33–37)
MCV RBC AUTO: 85.7 FL (ref 80–94)
MONOCYTES ABSOLUTE: 0.5 K/UL (ref 0–0.9)
MONOCYTES RELATIVE PERCENT: 5.6 % (ref 0–10)
NEUTROPHILS ABSOLUTE: 4.3 K/UL (ref 1.5–7.5)
NEUTROPHILS RELATIVE PERCENT: 48.9 % (ref 50–65)
PDW BLD-RTO: 12.7 % (ref 11.5–14.5)
PLATELET # BLD: 303 K/UL (ref 130–400)
PMV BLD AUTO: 10.5 FL (ref 9.4–12.4)
POTASSIUM REFLEX MAGNESIUM: 4 MMOL/L (ref 3.5–5)
RBC # BLD: 5.39 M/UL (ref 4.7–6.1)
SODIUM BLD-SCNC: 141 MMOL/L (ref 136–145)
TOTAL PROTEIN: 7.1 G/DL (ref 6.6–8.7)
TROPONIN: <0.01 NG/ML (ref 0–0.03)
WBC # BLD: 8.8 K/UL (ref 4.8–10.8)

## 2020-04-29 PROCEDURE — 85025 COMPLETE CBC W/AUTO DIFF WBC: CPT

## 2020-04-29 PROCEDURE — 99285 EMERGENCY DEPT VISIT HI MDM: CPT

## 2020-04-29 PROCEDURE — 84484 ASSAY OF TROPONIN QUANT: CPT

## 2020-04-29 PROCEDURE — 83690 ASSAY OF LIPASE: CPT

## 2020-04-29 PROCEDURE — 80053 COMPREHEN METABOLIC PANEL: CPT

## 2020-04-29 PROCEDURE — 93005 ELECTROCARDIOGRAM TRACING: CPT | Performed by: EMERGENCY MEDICINE

## 2020-04-29 PROCEDURE — 36415 COLL VENOUS BLD VENIPUNCTURE: CPT

## 2020-04-29 PROCEDURE — 71045 X-RAY EXAM CHEST 1 VIEW: CPT

## 2020-04-29 RX ORDER — OMEPRAZOLE 20 MG/1
20 CAPSULE, DELAYED RELEASE ORAL DAILY
Qty: 30 CAPSULE | Refills: 0 | Status: SHIPPED | OUTPATIENT
Start: 2020-04-29 | End: 2020-12-14 | Stop reason: ALTCHOICE

## 2020-04-29 RX ORDER — HYDROCODONE BITARTRATE AND ACETAMINOPHEN 7.5; 325 MG/1; MG/1
1 TABLET ORAL EVERY 6 HOURS PRN
Qty: 12 TABLET | Refills: 0 | Status: SHIPPED | OUTPATIENT
Start: 2020-04-29 | End: 2020-05-02

## 2020-04-29 ASSESSMENT — ENCOUNTER SYMPTOMS
BACK PAIN: 0
SHORTNESS OF BREATH: 0
NAUSEA: 0
COUGH: 0
VOMITING: 0

## 2020-04-29 ASSESSMENT — PAIN SCALES - GENERAL: PAINLEVEL_OUTOF10: 5

## 2020-04-29 NOTE — ED PROVIDER NOTES
Dr Toby Hickey on 4/29/2020 2:32 PM            ED BEDSIDE ULTRASOUND:   Performed by ED Physician - none    LABS:  Labs Reviewed   CBC WITH AUTO DIFFERENTIAL - Abnormal; Notable for the following components:       Result Value    Neutrophils % 48.9 (*)     Lymphocytes % 41.5 (*)     All other components within normal limits   COMPREHENSIVE METABOLIC PANEL W/ REFLEX TO MG FOR LOW K - Abnormal; Notable for the following components:    Glucose 111 (*)     ALT 45 (*)     All other components within normal limits   LIPASE   TROPONIN       All other labs were within normal range or not returned as of this dictation. EMERGENCY DEPARTMENT COURSE and DIFFERENTIAL DIAGNOSIS/MDM:   Vitals:    Vitals:    04/29/20 1451 04/29/20 1508 04/29/20 1532 04/29/20 1603   BP: (!) 145/80 130/71 137/84 129/69   Pulse: 87 78 65 73   Resp: 18 18  18   Temp:       TempSrc:       SpO2: 99% 98% 94% 96%   Weight:       Height:               MDM  Number of Diagnoses or Management Options  Diagnosis management comments: Pt very low risk for cardiac. Discussed Heart Score. Will try PPI. F/U PCP prn. CRITICAL CARE TIME   Total Critical Care time was 0 minutes, excluding separately reportable procedures. There was a high probability of clinically significant/lifethreatening deterioration in the patient's condition which required my urgent intervention. CONSULTS:  None    PROCEDURES:  Unless otherwise noted below, none     Procedures    FINAL IMPRESSION      1. Chest pain, unspecified type    2.  Dyspepsia          DISPOSITION/PLAN   DISPOSITION        PATIENT REFERRED TO:  HARVINDER Martin NPjuanjose [address] [phone]    Schedule an appointment as soon as possible for a visit         DISCHARGE MEDICATIONS:  Discharge Medication List as of 4/29/2020  3:58 PM      START taking these medications    Details   omeprazole (PRILOSEC) 20 MG delayed release capsule Take 1 capsule by mouth

## 2020-04-30 LAB
EKG P AXIS: 18 DEGREES
EKG P-R INTERVAL: 164 MS
EKG Q-T INTERVAL: 374 MS
EKG QRS DURATION: 124 MS
EKG QTC CALCULATION (BAZETT): 393 MS
EKG T AXIS: 65 DEGREES

## 2020-05-13 ENCOUNTER — HOSPITAL ENCOUNTER (EMERGENCY)
Facility: HOSPITAL | Age: 30
Discharge: HOME | End: 2020-05-13
Admitting: EMERGENCY MEDICINE

## 2020-05-13 ENCOUNTER — APPOINTMENT (OUTPATIENT)
Dept: CT IMAGING | Facility: HOSPITAL | Age: 30
End: 2020-05-13

## 2020-05-13 VITALS
BODY MASS INDEX: 40.63 KG/M2 | RESPIRATION RATE: 16 BRPM | HEIGHT: 72 IN | HEART RATE: 81 BPM | TEMPERATURE: 98.2 F | DIASTOLIC BLOOD PRESSURE: 81 MMHG | WEIGHT: 300 LBS | OXYGEN SATURATION: 94 % | SYSTOLIC BLOOD PRESSURE: 126 MMHG

## 2020-05-13 DIAGNOSIS — R56.9 SEIZURE (HCC): Primary | ICD-10-CM

## 2020-05-13 LAB
ALBUMIN SERPL-MCNC: 4.6 G/DL (ref 3.5–5.2)
ALBUMIN/GLOB SERPL: 1.9 G/DL
ALP SERPL-CCNC: 54 U/L (ref 39–117)
ALT SERPL W P-5'-P-CCNC: 49 U/L (ref 1–41)
AMPHET+METHAMPHET UR QL: NEGATIVE
AMPHETAMINES UR QL: NEGATIVE
ANION GAP SERPL CALCULATED.3IONS-SCNC: 15 MMOL/L (ref 5–15)
AST SERPL-CCNC: 33 U/L (ref 1–40)
BARBITURATES UR QL SCN: NEGATIVE
BASOPHILS # BLD AUTO: 0.06 10*3/MM3 (ref 0–0.2)
BASOPHILS NFR BLD AUTO: 0.6 % (ref 0–1.5)
BENZODIAZ UR QL SCN: NEGATIVE
BILIRUB SERPL-MCNC: 0.4 MG/DL (ref 0.2–1.2)
BILIRUB UR QL STRIP: NEGATIVE
BUN BLD-MCNC: 13 MG/DL (ref 6–20)
BUN/CREAT SERPL: 16.9 (ref 7–25)
BUPRENORPHINE SERPL-MCNC: NEGATIVE NG/ML
CALCIUM SPEC-SCNC: 9.4 MG/DL (ref 8.6–10.5)
CANNABINOIDS SERPL QL: NEGATIVE
CHLORIDE SERPL-SCNC: 104 MMOL/L (ref 98–107)
CLARITY UR: CLEAR
CO2 SERPL-SCNC: 22 MMOL/L (ref 22–29)
COCAINE UR QL: NEGATIVE
COLOR UR: YELLOW
CREAT BLD-MCNC: 0.77 MG/DL (ref 0.76–1.27)
DEPRECATED RDW RBC AUTO: 37.7 FL (ref 37–54)
EOSINOPHIL # BLD AUTO: 0.27 10*3/MM3 (ref 0–0.4)
EOSINOPHIL NFR BLD AUTO: 2.9 % (ref 0.3–6.2)
ERYTHROCYTE [DISTWIDTH] IN BLOOD BY AUTOMATED COUNT: 12.4 % (ref 12.3–15.4)
GFR SERPL CREATININE-BSD FRML MDRD: 119 ML/MIN/1.73
GLOBULIN UR ELPH-MCNC: 2.4 GM/DL
GLUCOSE BLD-MCNC: 108 MG/DL (ref 65–99)
GLUCOSE UR STRIP-MCNC: NEGATIVE MG/DL
HCT VFR BLD AUTO: 44.9 % (ref 37.5–51)
HGB BLD-MCNC: 15.6 G/DL (ref 13–17.7)
HGB UR QL STRIP.AUTO: NEGATIVE
HOLD SPECIMEN: NORMAL
HOLD SPECIMEN: NORMAL
IMM GRANULOCYTES # BLD AUTO: 0.08 10*3/MM3 (ref 0–0.05)
IMM GRANULOCYTES NFR BLD AUTO: 0.9 % (ref 0–0.5)
KETONES UR QL STRIP: NEGATIVE
LEUKOCYTE ESTERASE UR QL STRIP.AUTO: NEGATIVE
LIPASE SERPL-CCNC: 29 U/L (ref 13–60)
LYMPHOCYTES # BLD AUTO: 3.92 10*3/MM3 (ref 0.7–3.1)
LYMPHOCYTES NFR BLD AUTO: 42.4 % (ref 19.6–45.3)
MAGNESIUM SERPL-MCNC: 1.9 MG/DL (ref 1.6–2.6)
MCH RBC QN AUTO: 28.9 PG (ref 26.6–33)
MCHC RBC AUTO-ENTMCNC: 34.7 G/DL (ref 31.5–35.7)
MCV RBC AUTO: 83.1 FL (ref 79–97)
METHADONE UR QL SCN: NEGATIVE
MONOCYTES # BLD AUTO: 0.59 10*3/MM3 (ref 0.1–0.9)
MONOCYTES NFR BLD AUTO: 6.4 % (ref 5–12)
NEUTROPHILS # BLD AUTO: 4.32 10*3/MM3 (ref 1.7–7)
NEUTROPHILS NFR BLD AUTO: 46.8 % (ref 42.7–76)
NITRITE UR QL STRIP: NEGATIVE
NRBC BLD AUTO-RTO: 0 /100 WBC (ref 0–0.2)
OPIATES UR QL: NEGATIVE
OXYCODONE UR QL SCN: NEGATIVE
PCP UR QL SCN: NEGATIVE
PH UR STRIP.AUTO: <=5 [PH] (ref 5–8)
PLATELET # BLD AUTO: 288 10*3/MM3 (ref 140–450)
PMV BLD AUTO: 10.2 FL (ref 6–12)
POTASSIUM BLD-SCNC: 4 MMOL/L (ref 3.5–5.2)
PROPOXYPH UR QL: NEGATIVE
PROT SERPL-MCNC: 7 G/DL (ref 6–8.5)
PROT UR QL STRIP: NEGATIVE
RBC # BLD AUTO: 5.4 10*6/MM3 (ref 4.14–5.8)
SODIUM BLD-SCNC: 141 MMOL/L (ref 136–145)
SP GR UR STRIP: 1.02 (ref 1–1.03)
TRICYCLICS UR QL SCN: NEGATIVE
UROBILINOGEN UR QL STRIP: NORMAL
WBC NRBC COR # BLD: 9.24 10*3/MM3 (ref 3.4–10.8)
WHOLE BLOOD HOLD SPECIMEN: NORMAL
WHOLE BLOOD HOLD SPECIMEN: NORMAL

## 2020-05-13 PROCEDURE — 83735 ASSAY OF MAGNESIUM: CPT | Performed by: NURSE PRACTITIONER

## 2020-05-13 PROCEDURE — 83690 ASSAY OF LIPASE: CPT | Performed by: NURSE PRACTITIONER

## 2020-05-13 PROCEDURE — 25010000003 LEVETIRACETAM IN NACL 0.75% 1000 MG/100ML SOLUTION: Performed by: NURSE PRACTITIONER

## 2020-05-13 PROCEDURE — 81003 URINALYSIS AUTO W/O SCOPE: CPT | Performed by: NURSE PRACTITIONER

## 2020-05-13 PROCEDURE — 80053 COMPREHEN METABOLIC PANEL: CPT | Performed by: NURSE PRACTITIONER

## 2020-05-13 PROCEDURE — 85025 COMPLETE CBC W/AUTO DIFF WBC: CPT | Performed by: NURSE PRACTITIONER

## 2020-05-13 PROCEDURE — 99284 EMERGENCY DEPT VISIT MOD MDM: CPT

## 2020-05-13 PROCEDURE — 96374 THER/PROPH/DIAG INJ IV PUSH: CPT

## 2020-05-13 PROCEDURE — 80306 DRUG TEST PRSMV INSTRMNT: CPT | Performed by: NURSE PRACTITIONER

## 2020-05-13 PROCEDURE — 70450 CT HEAD/BRAIN W/O DYE: CPT

## 2020-05-13 RX ORDER — LEVETIRACETAM 10 MG/ML
1000 INJECTION INTRAVASCULAR ONCE
Status: COMPLETED | OUTPATIENT
Start: 2020-05-13 | End: 2020-05-13

## 2020-05-13 RX ORDER — LEVETIRACETAM 500 MG/1
500 TABLET ORAL 2 TIMES DAILY
Qty: 60 TABLET | Refills: 0 | Status: SHIPPED | OUTPATIENT
Start: 2020-05-13 | End: 2020-06-12

## 2020-05-13 RX ADMIN — LEVETIRACETAM 1000 MG: 1000 INJECTION, SOLUTION INTRAVENOUS at 11:04

## 2020-05-13 NOTE — DISCHARGE INSTRUCTIONS
Maintain seizure precautions; no driving per state law guidelines; f/u with Dr. Wyatt with neurology

## 2020-05-13 NOTE — ED PROVIDER NOTES
Subjective   Patient is a 28 yo male presents to the ER via EMS with complaints of seizures. He has known hx of seizures but admits he is noncompliant with medications. He tells this examiner he was followed by Dr. Wyatt with neurology however when he lost his health insurance both medications and doctor's visits were too expensive for him. He has taken dilantin, keppra, and tegretol in the past - states he hasn't been compliant with any seizure medication in approx 4 years. He reports last seizure was 3 weeks ago. Patient states that today he woke up not feeling well. He states he was working at [employer] and reports feeling nauseated. He stepped outside the restaurant to get some fresh air and states that is the last thing he recalls. He woke up on the ground and co-workers told him he appeared to have what is described as a grand mal seizure. He denies loss of bowel or bladder control. He complains of head pain and believes he did hit his head during the event.       Seizures   Seizure type:  Grand mal  Preceding symptoms: headache and nausea    Episode characteristics: abnormal movements and generalized shaking    Return to baseline: yes    Timing:  Once  Progression:  Resolved  Context: medical non-compliance    Recent head injury:  No recent head injuries  History of seizures: yes        Review of Systems   Constitutional: Negative.    HENT: Negative.    Respiratory: Negative.    Cardiovascular: Negative.    Gastrointestinal: Positive for nausea.   Genitourinary: Negative.    Musculoskeletal: Negative.    Skin: Negative.    Neurological: Positive for seizures.   All other systems reviewed and are negative.      Past Medical History:   Diagnosis Date   • Seizures (CMS/HCC)        No Known Allergies    Past Surgical History:   Procedure Laterality Date   • ADENOIDECTOMY     • TONSILLECTOMY         History reviewed. No pertinent family history.    Social History     Socioeconomic History   •  Marital status: Single     Spouse name: Not on file   • Number of children: Not on file   • Years of education: Not on file   • Highest education level: Not on file   Tobacco Use   • Smoking status: Never Smoker   • Smokeless tobacco: Never Used   Substance and Sexual Activity   • Alcohol use: No           Objective   Physical Exam   Constitutional: He is oriented to person, place, and time. He appears well-developed and well-nourished. No distress.   HENT:   Head: Atraumatic.   Nose: Nose normal.   Mouth/Throat: Oropharynx is clear and moist.   Eyes: Pupils are equal, round, and reactive to light. Conjunctivae are normal. No scleral icterus.   Neck: Normal range of motion. Neck supple. No JVD present. No thyromegaly present.   Cardiovascular: Normal rate, regular rhythm, normal heart sounds and intact distal pulses.   No murmur heard.  Pulmonary/Chest: Effort normal and breath sounds normal. No respiratory distress. He has no wheezes. He has no rales. He exhibits no tenderness.   Abdominal: Soft. Bowel sounds are normal. He exhibits no distension and no mass. There is no tenderness. There is no rebound and no guarding.   Musculoskeletal: Normal range of motion. He exhibits no edema.   Lymphadenopathy:     He has no cervical adenopathy.   Neurological: He is alert and oriented to person, place, and time. He has normal reflexes. No cranial nerve deficit. Coordination normal.   Skin: Skin is warm and dry. No rash noted. He is not diaphoretic. No erythema. No pallor.   Psychiatric: He has a normal mood and affect. His behavior is normal. Judgment and thought content normal.   Nursing note and vitals reviewed.      Procedures           ED Course labs unremarkable and ct scan is negative for acute findings. Patient received iv keppra while in the er. He will need to remain on seizure precautions and avoid driving per [place] state laws. He tells me that he has a sleep study scheduled and appt already scheduled with neurology.  Reviewed with dr. sylvester who agrees with treatment plan.                                            MDM  Number of Diagnoses or Management Options  Seizure (CMS/HCC): new and requires workup     Amount and/or Complexity of Data Reviewed  Clinical lab tests: ordered and reviewed  Tests in the radiology section of CPT®: ordered and reviewed  Discuss the patient with other providers: yes    Risk of Complications, Morbidity, and/or Mortality  Presenting problems: moderate  Diagnostic procedures: moderate  Management options: moderate    Patient Progress  Patient progress: improved      Final diagnoses:   Seizure (CMS/HCC)            Shwetha Curran, APRN  05/13/20 1434

## 2020-05-18 ENCOUNTER — VIRTUAL VISIT (OUTPATIENT)
Dept: PRIMARY CARE CLINIC | Age: 30
End: 2020-05-18
Payer: MEDICAID

## 2020-05-18 PROBLEM — R56.9 SEIZURE (HCC): Status: ACTIVE | Noted: 2018-06-12

## 2020-05-18 PROCEDURE — 99214 OFFICE O/P EST MOD 30 MIN: CPT | Performed by: NURSE PRACTITIONER

## 2020-05-18 NOTE — PROGRESS NOTES
2020    TELEHEALTH EVALUATION -- Audio/Visual (During EYUBK-37 public health emergency)    HPI:    Tasia Ruiz (:  1990) has requested an audio/video evaluation for the following concern(s):    Sleep study  Patient reports he is scheduled for his sleep study this Wednesday. History of Seizures   Patient reports 4-5 episodes/ seizures since last visit. Patient reports tonic-clonic seizure at work and was sent to ER. Patient reports he was given a prescription for Keppra, but hasn't started taking it because previous side effects while taking the medication. Patient reports the last medication he was taking was topamax 200 mg. Patient reports he took himself off these medications several months before establishing care. Reports he was previously seen by Dr. Victorino Hines for his history of seizures and treatment, but its been over one year. Previous visit we discussed neurology referral, but patient reported he didn't not want to be on medications due to side effects. Patient reports he is now ready for a referral.     Chest Pain/ GERD  Patient reports he has been seen in the ER for chest pain that was previously determined to be GERD. Patient was prescribed omeprazole, but hasn't been taking it consistently. Patient reports he has recently tried to remember to take his medication more often now and has noticed a reduction in the chest pain. Patient reports the majority of the previous chest pain was noted after greasy meals or when laying down after eating. Patient reports its hard for him to avoid greasy foods due to his occupation. Patient is currently a cook at [employer]. Review of Systems   Constitutional: Negative. HENT: Negative. Eyes: Negative. Respiratory: Negative. Cardiovascular: Negative. Gastrointestinal:        GERD   Endocrine: Negative. Genitourinary: Negative. Musculoskeletal: Negative. Skin: Negative. Allergic/Immunologic: Negative. Neurological: Positive for seizures. Hematological: Negative. Psychiatric/Behavioral: Negative. Prior to Visit Medications    Medication Sig Taking? Authorizing Provider   omeprazole (PRILOSEC) 20 MG delayed release capsule Take 1 capsule by mouth daily Yes Gin Argueta MD       Social History     Tobacco Use    Smoking status: Never Smoker    Smokeless tobacco: Never Used   Substance Use Topics    Alcohol use: Yes     Comment: occ    Drug use: No        No Known Allergies,   Past Medical History:   Diagnosis Date    Seizure ([address]    ,   Past Surgical History:   Procedure Laterality Date    TONSILLECTOMY AND ADENOIDECTOMY      TYMPANOSTOMY TUBE PLACEMENT Bilateral 1998       PHYSICAL EXAMINATION:    Constitutional: [x] Appears well-developed and well-nourished [x] No apparent distress      [] Abnormal-   Mental status  [x] Alert and awake  [x] Oriented to person/place/time [x]Able to follow commands      Eyes:  EOM    [x]  Normal  [] Abnormal-  Sclera  [x]  Normal  [] Abnormal -         Discharge [x]  None visible  [] Abnormal -    HENT:   [x] Normocephalic, atraumatic.   [] Abnormal   [x] Mouth/Throat: Mucous membranes are moist.     External Ears [x] Normal  [] Abnormal-     Neck: [x] No visualized mass     Pulmonary/Chest: [x] Respiratory effort normal.  [x] No visualized signs of difficulty breathing or respiratory distress        [] Abnormal-      Musculoskeletal:   [x] Normal gait with no signs of ataxia         [x] Normal range of motion of neck        [] Abnormal-       Neurological:        [x] No Facial Asymmetry (Cranial nerve 7 motor function) (limited exam to video visit)          [x] No gaze palsy        [] Abnormal-         Skin:        [x] No significant exanthematous lesions or discoloration noted on facial skin         [] Abnormal-            Psychiatric:       [x] Normal Affect [x] No Hallucinations        [] Abnormal-     Other pertinent observable physical exam findings-     ASSESSMENT/PLAN:  1. History of seizures  - referral to neurology placed; discussed restarting medications, but patient would like to wait until appointment with Dr. Madiha Davis MD, Neurology, [place]    2. Gastroesophageal reflux disease, esophagitis presence not specified  - take omeprazole as prescribed   - educated on the need for diet and lifestyle modifications    3. Encounter for counseling  - referral, medication, and follow-up    Over 50% of the total visit time of 25 minutes was spent on counseling and or coordination of care of history of seizures, GERD, counseling, medication, referral, and follow-up. Return in about 3 months (around 8/18/2020) for Med recheck, Annual physical.    Ceasar Taylor is a 34 y.o. male being evaluated by a Virtual Visit (video visit) encounter to address concerns as mentioned above. A caregiver was present when appropriate. Due to this being a TeleHealth encounter ([facility]), evaluation of the following organ systems was limited: Vitals/Constitutional/EENT/Resp/CV/GI//MS/Neuro/Skin/Heme-Lymph-Imm. Pursuant to the emergency declaration under the [address] [place] [address], [address] [place] [address] authority and the [employer] Act, this Virtual Visit was conducted with patient's (and/or legal guardian's) consent, to reduce the patient's risk of exposure to COVID-19 and provide necessary medical care. The patient (and/or legal guardian) has also been advised to contact this office for worsening conditions or problems, and seek emergency medical treatment and/or call 911 if deemed necessary. Patient identification was verified at the start of the visit: Yes    Total time spent on this encounter: 25 minutes    Services were provided through a video synchronous discussion virtually to substitute for in-person clinic visit.  Patient and provider were located at their individual homes. --HARVINDER Sun NP on 5/19/2020 at 9:55 AM    An electronic signature was used to authenticate this note.

## 2020-05-19 ASSESSMENT — ENCOUNTER SYMPTOMS
EYES NEGATIVE: 1
RESPIRATORY NEGATIVE: 1
ALLERGIC/IMMUNOLOGIC NEGATIVE: 1

## 2020-05-20 ENCOUNTER — HOSPITAL ENCOUNTER (OUTPATIENT)
Dept: SLEEP CENTER | Age: 30
Discharge: HOME | End: 2020-05-22
Payer: MEDICAID

## 2020-05-20 PROCEDURE — 95811 POLYSOM 6/>YRS CPAP 4/> PARM: CPT

## 2020-05-21 ENCOUNTER — TELEPHONE (OUTPATIENT)
Dept: NEUROLOGY | Age: 30
End: 2020-05-21

## 2020-05-21 NOTE — PROGRESS NOTES
[place] [address]  [place], [address]  Phone [phone] Fax [phone]     Sleep Study Technician Review    Patient Name:  Hanna Akhtar  :   1990  Referring Provider: MARYAM Sawant*    Brief History:  Hanna Akhtar is a 34 y.o. Hanna Akhtar is a 34 y.o. male who presents for possible ear infection. Symptoms include left ear pain, congestion, cough, fever and swollen glands. Onset of symptoms was 2 days ago, rapidly worsening since that time. Associated symptoms include congestion, cough described as non-productive, without wheezing, dyspnea or hemoptysis, facial pain, headache described as dull/ aching, low grade fever, nasal congestion, post nasal drip, purulent nasal discharge and sinus pressure, which have been present for 2 days . He is drinking moderate amounts of fluids  Patient's medications, allergies, past medical, surgical, social and family histories were reviewed and updated as appropriate. Patient reports 4-5 episodes/ seizures since last visit. Patient reports tonic-clonic seizure at work and was sent to ER. Patient reports he was given a prescription for Keppra, but hasn't started taking it because previous side effects while taking the medication. Patient reports the last medication he was taking was topamax 200 mg. Patient reports he took himself off these medications several months before establishing care. Height: 72 inches  Weight:318 lbs   BMI: 43.13   Neck Circ:18 inches   MALLAMPATI: 4  ESS: 7     Type of Study:  Split Night Study  Time Stage Position Snore Hypopnea Obs Apnea Misha Apnea PAP O2   2200 3 supine yes yes no no  RA   2300 2 supine yes  yes no no  RA   2400 2 supine yes yes no no  RA   0100 rem supine yes yes no no +11 RA   0200 2 supine yes yes no no +14 RA   0300 2 supine no no no no +14 RA   0400 awake supine no no no no +15 RA     Summary:  Patient had several events with desats in the upper eighties.

## 2020-05-22 PROCEDURE — 95811 POLYSOM 6/>YRS CPAP 4/> PARM: CPT | Performed by: PSYCHIATRY & NEUROLOGY

## 2020-05-28 ENCOUNTER — HOSPITAL ENCOUNTER (EMERGENCY)
Age: 30
Discharge: HOME | End: 2020-05-28
Attending: EMERGENCY MEDICINE
Payer: MEDICAID

## 2020-05-28 ENCOUNTER — APPOINTMENT (OUTPATIENT)
Dept: CT IMAGING | Age: 30
End: 2020-05-28
Payer: MEDICAID

## 2020-05-28 VITALS
HEIGHT: 72 IN | SYSTOLIC BLOOD PRESSURE: 132 MMHG | BODY MASS INDEX: 40.63 KG/M2 | HEART RATE: 84 BPM | OXYGEN SATURATION: 94 % | TEMPERATURE: 99 F | DIASTOLIC BLOOD PRESSURE: 81 MMHG | RESPIRATION RATE: 14 BRPM | WEIGHT: 300 LBS

## 2020-05-28 LAB
ALBUMIN SERPL-MCNC: 4.6 G/DL (ref 3.5–5.2)
ALP BLD-CCNC: 55 U/L (ref 40–130)
ALT SERPL-CCNC: 39 U/L (ref 5–41)
AMPHETAMINE SCREEN, URINE: NEGATIVE
ANION GAP SERPL CALCULATED.3IONS-SCNC: 14 MMOL/L (ref 7–19)
AST SERPL-CCNC: 26 U/L (ref 5–40)
BARBITURATE SCREEN URINE: NEGATIVE
BASOPHILS ABSOLUTE: 0.1 K/UL (ref 0–0.2)
BASOPHILS RELATIVE PERCENT: 0.7 % (ref 0–1)
BENZODIAZEPINE SCREEN, URINE: NEGATIVE
BILIRUB SERPL-MCNC: 0.6 MG/DL (ref 0.2–1.2)
BUN BLDV-MCNC: 18 MG/DL (ref 6–20)
CALCIUM SERPL-MCNC: 9.6 MG/DL (ref 8.6–10)
CANNABINOID SCREEN URINE: NEGATIVE
CHLORIDE BLD-SCNC: 102 MMOL/L (ref 98–111)
CO2: 24 MMOL/L (ref 22–29)
COCAINE METABOLITE SCREEN URINE: NEGATIVE
CREAT SERPL-MCNC: 1 MG/DL (ref 0.5–1.2)
EOSINOPHILS ABSOLUTE: 0.3 K/UL (ref 0–0.6)
EOSINOPHILS RELATIVE PERCENT: 2.7 % (ref 0–5)
GFR NON-AFRICAN AMERICAN: >60
GLUCOSE BLD-MCNC: 86 MG/DL (ref 74–109)
GLUCOSE BLD-MCNC: 91 MG/DL (ref 70–99)
HCT VFR BLD CALC: 45 % (ref 42–52)
HEMOGLOBIN: 15.8 G/DL (ref 14–18)
IMMATURE GRANULOCYTES #: 0 K/UL
LACTIC ACID: 3.1 MMOL/L (ref 0.5–1.9)
LYMPHOCYTES ABSOLUTE: 4 K/UL (ref 1.1–4.5)
LYMPHOCYTES RELATIVE PERCENT: 42.8 % (ref 20–40)
Lab: NORMAL
MCH RBC QN AUTO: 29.9 PG (ref 27–31)
MCHC RBC AUTO-ENTMCNC: 35.1 G/DL (ref 33–37)
MCV RBC AUTO: 85.2 FL (ref 80–94)
MONOCYTES ABSOLUTE: 0.6 K/UL (ref 0–0.9)
MONOCYTES RELATIVE PERCENT: 6.7 % (ref 0–10)
NEUTROPHILS ABSOLUTE: 4.4 K/UL (ref 1.5–7.5)
NEUTROPHILS RELATIVE PERCENT: 46.7 % (ref 50–65)
OPIATE SCREEN URINE: NEGATIVE
PDW BLD-RTO: 12.5 % (ref 11.5–14.5)
PERFORMED ON: NORMAL
PLATELET # BLD: 313 K/UL (ref 130–400)
PMV BLD AUTO: 10.7 FL (ref 9.4–12.4)
POTASSIUM SERPL-SCNC: 3.8 MMOL/L (ref 3.5–5)
RBC # BLD: 5.28 M/UL (ref 4.7–6.1)
SODIUM BLD-SCNC: 140 MMOL/L (ref 136–145)
TOTAL PROTEIN: 6.9 G/DL (ref 6.6–8.7)
WBC # BLD: 9.4 K/UL (ref 4.8–10.8)

## 2020-05-28 PROCEDURE — 6360000002 HC RX W HCPCS: Performed by: EMERGENCY MEDICINE

## 2020-05-28 PROCEDURE — 96365 THER/PROPH/DIAG IV INF INIT: CPT

## 2020-05-28 PROCEDURE — 80053 COMPREHEN METABOLIC PANEL: CPT

## 2020-05-28 PROCEDURE — 80307 DRUG TEST PRSMV CHEM ANLYZR: CPT

## 2020-05-28 PROCEDURE — 36415 COLL VENOUS BLD VENIPUNCTURE: CPT

## 2020-05-28 PROCEDURE — 96375 TX/PRO/DX INJ NEW DRUG ADDON: CPT

## 2020-05-28 PROCEDURE — 70450 CT HEAD/BRAIN W/O DYE: CPT

## 2020-05-28 PROCEDURE — 82947 ASSAY GLUCOSE BLOOD QUANT: CPT

## 2020-05-28 PROCEDURE — 72131 CT LUMBAR SPINE W/O DYE: CPT

## 2020-05-28 PROCEDURE — 99285 EMERGENCY DEPT VISIT HI MDM: CPT

## 2020-05-28 PROCEDURE — 85025 COMPLETE CBC W/AUTO DIFF WBC: CPT

## 2020-05-28 PROCEDURE — 83605 ASSAY OF LACTIC ACID: CPT

## 2020-05-28 RX ORDER — LEVETIRACETAM 500 MG/1
500 TABLET ORAL 2 TIMES DAILY
Qty: 60 TABLET | Refills: 1 | Status: SHIPPED | OUTPATIENT
Start: 2020-05-28 | End: 2020-07-06

## 2020-05-28 RX ORDER — MORPHINE SULFATE 4 MG/ML
4 INJECTION, SOLUTION INTRAMUSCULAR; INTRAVENOUS ONCE
Status: COMPLETED | OUTPATIENT
Start: 2020-05-28 | End: 2020-05-28

## 2020-05-28 RX ORDER — LEVETIRACETAM 10 MG/ML
1000 INJECTION INTRAVASCULAR ONCE
Status: COMPLETED | OUTPATIENT
Start: 2020-05-28 | End: 2020-05-28

## 2020-05-28 RX ADMIN — LEVETIRACETAM 1000 MG: 10 INJECTION INTRAVENOUS at 19:39

## 2020-05-28 RX ADMIN — MORPHINE SULFATE 4 MG: 4 INJECTION INTRAVENOUS at 20:57

## 2020-05-28 ASSESSMENT — PAIN DESCRIPTION - LOCATION: LOCATION: HEAD

## 2020-05-28 ASSESSMENT — PAIN SCALES - GENERAL
PAINLEVEL_OUTOF10: 6
PAINLEVEL_OUTOF10: 6

## 2020-05-29 RX ORDER — TOPIRAMATE 50 MG/1
TABLET, FILM COATED ORAL
Qty: 20 TABLET | Refills: 0 | Status: SHIPPED | OUTPATIENT
Start: 2020-05-29 | End: 2020-07-06

## 2020-05-29 RX ORDER — TOPIRAMATE 100 MG/1
100 TABLET, FILM COATED ORAL 2 TIMES DAILY
Qty: 60 TABLET | Refills: 2 | Status: SHIPPED | OUTPATIENT
Start: 2020-05-29 | End: 2020-09-01

## 2020-05-29 NOTE — ED PROVIDER NOTES
Bilateral 1998         CURRENT MEDICATIONS     Previous Medications    OMEPRAZOLE (PRILOSEC) 20 MG DELAYED RELEASE CAPSULE    Take 1 capsule by mouth daily       ALLERGIES     Patient has no known allergies.     FAMILY HISTORY       Family History   Problem Relation Age of Onset    High Blood Pressure Father     High Cholesterol Father     Alcohol Abuse Father     Cancer Brother 29        testicular    Diabetes Maternal Uncle     Pancreatic Cancer Maternal Grandmother           SOCIAL HISTORY       Social History     Socioeconomic History    Marital status:      Spouse name: None    Number of children: None    Years of education: None    Highest education level: None   Occupational History    None   Social Needs    Financial resource strain: None    Food insecurity     Worry: None     Inability: None    Transportation needs     Medical: None     Non-medical: None   Tobacco Use    Smoking status: Never Smoker    Smokeless tobacco: Never Used   Substance and Sexual Activity    Alcohol use: Yes     Comment: occ    Drug use: No    Sexual activity: None   Lifestyle    Physical activity     Days per week: None     Minutes per session: None    Stress: None   Relationships    Social connections     Talks on phone: None     Gets together: None     Attends Shinto service: None     Active member of club or organization: None     Attends meetings of clubs or organizations: None     Relationship status: None    Intimate partner violence     Fear of current or ex partner: None     Emotionally abused: None     Physically abused: None     Forced sexual activity: None   Other Topics Concern    None   Social History Narrative    None       SCREENINGS             PHYSICAL EXAM    (up to 7 for level 4, 8 or more for level 5)     ED Triage Vitals   BP Temp Temp src Pulse Resp SpO2 Height Weight   05/28/20 1744 05/28/20 1744 -- 05/28/20 1744 05/28/20 1744 05/28/20 1744 05/28/20 1830 05/28/20 1830   (!) No definite acute intracranial abnormality. Small 4 mm   focus of probable artifactually increased attenuation at the right   frontal lobe. Signed by Dr Cameron Dallas on 5/28/2020 8:54 PM              LABS:  Labs Reviewed   CBC WITH AUTO DIFFERENTIAL - Abnormal; Notable for the following components:       Result Value    Neutrophils % 46.7 (*)     Lymphocytes % 42.8 (*)     All other components within normal limits   LACTIC ACID, PLASMA - Abnormal; Notable for the following components:    Lactic Acid 3.1 (*)     All other components within normal limits    Narrative:     Nardakika Oneil tel. ,  Chemistry results called to and read back by Eli/RN/ER, 05/28/2020 20:15,  by Masood   POCT GLUCOSE       All other labs were within normal range or not returned as of this dictation. EMERGENCY DEPARTMENT COURSE and DIFFERENTIAL DIAGNOSIS/MDM:   Vitals:    Vitals:    05/28/20 1744 05/28/20 1830 05/28/20 2105   BP: (!) 107/58  132/81   Pulse: 99  84   Resp: 16  14   Temp: 99 °F (37.2 °C)     SpO2: 95%  94%   Weight:  300 lb (136.1 kg)    Height:  6' (1.829 m)        MDM    Reassessment    Patient has returned to his baseline mental status at this time. CONSULTS:    Case was discussed with Dr. Keyonna Ireland regarding neurology consultation. Advised patient to resume his prior dose of Keppra and to phone the office tomorrow to schedule outpatient appointment with Dr. Sandi Potts ordered to see if he wants to make any medication adjustments. PROCEDURES:  Unless otherwise noted below, none     Procedures    FINAL IMPRESSION      1.  Seizure disorder [facility])          DISPOSITION/PLAN   DISPOSITION Decision To Discharge 05/28/2020 10:52:34 PM      PATIENT REFERRED TO:  Silvia Worthy MD  [address] [facility] [phone]      Call tomorrow morning to schedule appointment      DISCHARGE MEDICATIONS:  New Prescriptions    LEVETIRACETAM (KEPPRA) 500 MG TABLET    Take 1 tablet by mouth 2 times daily          (Please note that portions of this note were completed with a voice recognition program.  Efforts were made to edit thedictations but occasionally words are mis-transcribed.)    Lebron Card MD (electronically signed)  Attending Emergency Physician          Lebron Card MD  06/14/20 [id number]

## 2020-06-14 ASSESSMENT — ENCOUNTER SYMPTOMS
BACK PAIN: 0
CHEST TIGHTNESS: 0
ABDOMINAL PAIN: 0
SHORTNESS OF BREATH: 0

## 2020-07-06 ENCOUNTER — TELEPHONE (OUTPATIENT)
Dept: NEUROLOGY | Age: 30
End: 2020-07-06

## 2020-07-06 ENCOUNTER — OFFICE VISIT (OUTPATIENT)
Dept: NEUROLOGY | Age: 30
End: 2020-07-06
Payer: MEDICAID

## 2020-07-06 VITALS
HEIGHT: 72 IN | BODY MASS INDEX: 41.99 KG/M2 | HEART RATE: 76 BPM | DIASTOLIC BLOOD PRESSURE: 80 MMHG | WEIGHT: 310 LBS | SYSTOLIC BLOOD PRESSURE: 128 MMHG

## 2020-07-06 PROCEDURE — 99203 OFFICE O/P NEW LOW 30 MIN: CPT | Performed by: PSYCHIATRY & NEUROLOGY

## 2020-07-06 RX ORDER — TOPIRAMATE 100 MG/1
200 TABLET, FILM COATED ORAL 2 TIMES DAILY
Qty: 120 TABLET | Refills: 5 | Status: SHIPPED | OUTPATIENT
Start: 2020-07-06 | End: 2021-09-15

## 2020-07-06 NOTE — TELEPHONE ENCOUNTER
Called patient about his mri and eeg awake and asleep. Spoke with patient about these test time and date of  Appointments, patient is aware of test date and time.

## 2020-07-06 NOTE — PATIENT INSTRUCTIONS
INSTRUCTIONS:  1. Will arrange an MRI head  2. Will arrange an EEG  3. Increase the Topamax to 100 mg in the am and 150 mg in the pm for a week, then to 150 mg twice a day for a week, then to 150 mg in the am and 200 mg in the pm for a week, then to 200 mg twice a day.   4. Use the CPAP during sleep

## 2020-07-06 NOTE — PROGRESS NOTES
[address], [address]  [facility], [facility]  Phone [phone]  Fax [phone]     [facility] NEUROLOGY NEW PATIENT VISIT        Patient: Lennis Sicard  :  1990  Age:  34 y.o. MRN:  [id number]  Account #:  [de-identified]:  [id number]    Referring Provider: HARVINDER Eid NP  [facility] [address]  Consulting Provider: Haley Street M.D.    [facility]:  Chief Complaint   Patient presents with    New Patient     pt has seen Dr. Logan Watson previously. he is here today for seizures        History Source: History obtained from chart review and the patient. PCP: HARVINDER Ruggiero NP    HISTORY OF PRESENTILLNESS:   Lennis Sicard is a 34y.o. year old man with a history of seizures who was referred for evaluation and treatment. His first seizure was in the 8th grade. He has been treated with Dilantin, Keppra, and Topamax. He currently takes Topamax 100 mg BID. He has been on a higher dose in the past.  He admits he has not been taking medications regularly. He has had several seizures this year. The seizures start with nausea and a feeling that he may have a seizure, a feeling in his head. This lasts 5 to 10 minutes then he passes out and has convulsions. In the past he has had seizure without loss of consciousness but loss of awareness. The convulsions last about 5 minutes. He is drowsy and confused afterward for minutes to hours. He has had snoring and daytime somnolence. He had a polysomnogram  that showed severe PAULA with an AHI of 118 with low oxygen saturation of 75%. He was set up on an auto CPAP. He is struggling to sleep and use his CPAP.       PAST MEDICAL HITORY:    Past Medical History:   Diagnosis Date    Psoriasis     Seizure ([address]     Sleep apnea        Past Surgical History:   Procedure Laterality Date    TONSILLECTOMY AND ADENOIDECTOMY      TYMPANOSTOMY TUBE PLACEMENT Bilateral        Recent Hospitalizations  · None    Significant Injuries  · None    Habits  Gabrielle Adams reports that he has never smoked. He has never used smokeless tobacco. He reports current alcohol use. He reports that he does not use drugs. Current Outpatient Medications   Medication Sig Dispense Refill    topiramate (TOPAMAX) 100 MG tablet Take 1 tablet by mouth 2 times daily 60 tablet 2    omeprazole (PRILOSEC) 20 MG delayed release capsule Take 1 capsule by mouth daily 30 capsule 0     No current facility-administered medications for this visit. Allergies as of 07/06/2020 - Review Complete 07/06/2020   Allergen Reaction Noted    Keppra [levetiracetam]  07/06/2020       FAMILY HISTORY:   Family History   Problem Relation Age of Onset    High Blood Pressure Father     High Cholesterol Father     Alcohol Abuse Father     Cancer Brother 29        testicular    Diabetes Maternal Uncle     Pancreatic Cancer Maternal Grandmother        SOCIAL HISTORY:   Gabrielle Adams is , lives in [place], [place], and works at [employer]. REVIEW OF SYSTEMS:  Review of Systems   Constitutional: Positive for fatigue. Negative for chills and fever. HENT: Negative for hearing loss and tinnitus. Eyes: Negative for photophobia and visual disturbance. Respiratory: Negative for cough and shortness of breath. Cardiovascular: Negative for chest pain and palpitations. Gastrointestinal: Negative for nausea and vomiting. Endocrine: Negative for polydipsia and polyphagia. Genitourinary: Negative for frequency and urgency. Musculoskeletal: Positive for back pain. Negative for arthralgias. Skin: Negative for rash and wound. Allergic/Immunologic: Negative for environmental allergies and food allergies. Neurological: Positive for seizures. Negative for dizziness, tremors, syncope, facial asymmetry, speech difficulty, weakness, light-headedness, numbness and headaches. Hematological: Negative for adenopathy. Does not bruise/bleed easily. Psychiatric/Behavioral: Negative for dysphoric mood. The patient is not nervous/anxious. PHYSICAL EXAMINATION:  Vitals:  /80   Pulse 76   Ht 6' (1.829 m)   Wt (!) 310 lb (140.6 kg)   BMI 42.04 kg/m²   General appearance:  Alert, well developed, well nourished, in no distress  HEENT:  normocephalic, atraumatic, sclera appear normal, no nasal abnormalities, no rhinorrhea, Ears appear normal, oral mucous membranes are moist without erythema, trachea midline, thyroid is normal, no lymphadenopathy or neck mass. Cardiovascular:  Regular rate and rhythm without murmer. No peripheral edema, No cyanosis or clubbing. No carotid bruits. Pulmonary:  Lungs are clear to auscultation. Breathing appears normal, good expansion, normal effort without use of accessory muscles  Musculoskeletal:  Joints are normal.  No splints, slings, or casts. Spine appears normal with normal posture and range of motion. Integument:  No rash, erythema, or pallor  Psychiatric:  Mood, affect, and behavior appear normal      NEUROLOGIC EXAMINATION:  Neurologic Exam     Mental Status   Oriented to person, place, and time. Speech: speech is normal   Level of consciousness: alert  Speech is fluent. Cranial Nerves     CN II   Visual fields full to confrontation. CN III, IV, VI   Pupils are equal, round, and reactive to light. Extraocular motions are normal.     CN VII   Facial expression full, symmetric.      CN VIII   Hearing: intact    CN IX, X   Palate: symmetric    CN XI   Right sternocleidomastoid strength: normal  Left sternocleidomastoid strength: normal  Right trapezius strength: normal  Left trapezius strength: normal    CN XII   Tongue: not atrophic  Fasciculations: absent  Tongue deviation: none    Motor Exam   Muscle bulk: normal  Overall muscle tone: normal  Right arm pronator drift: absent  Left arm pronator drift: absent    Strength   Right neck flexion: 5/5  Left neck flexion: 5/5  Right neck extension: 5/5  Left neck extension: 5/5  Right deltoid: 5/5  Left deltoid: 5/5  Right biceps: 5/5  Left biceps: 5/5  Right triceps: 5/5  Left triceps: 5/5  Right wrist flexion: 5/5  Left wrist flexion: 5/5  Right wrist extension: 5/5  Left wrist extension: 5/5  Right interossei: 5/5  Left interossei: 5/5  Right iliopsoas: 5/5  Left iliopsoas: 5/5  Right quadriceps: 5/5  Left quadriceps: 5/5  Right glutei: 5/5  Left glutei: 5/5  Right anterior tibial: 5/5  Left anterior tibial: 5/5  Right posterior tibial: 5/5  Left posterior tibial: 5/5  Right peroneal: 5/5  Left peroneal: 5/5  Right gastroc: 5/5  Left gastroc: 5/5    Sensory Exam   Light touch normal.   Vibration normal.     Gait, Coordination, and Reflexes     Gait  Gait: normal    Coordination   Romberg: negative  Finger to nose coordination: normal  Heel to shin coordination: normal    Tremor   Resting tremor: absent  Intention tremor: absent  Action tremor: absent    Reflexes   Right brachioradialis: 2+  Left brachioradialis: 2+  Right biceps: 2+  Left biceps: 2+  Right triceps: 2+  Left triceps: 2+  Right patellar: 2+  Left patellar: 2+  Right achilles: 2+  Left achilles: 2+  Right plantar: normal  Left plantar: normal  Right Pearl: absent  Left Pearl: absent  Right ankle clonus: absent  Left ankle clonus: absent  Rapid alternating movements are normal.       ADDITIONAL REVIEW:  Narrative   EXAMINATION: CT HEAD WO CONTRAST 5/28/2020 8:48 PM   HISTORY: Head injury, seizure. DOSE: 875 mGycm. Automatic exposure control was utilized in an effort   to use as little radiation as possible, without compromising image   quality. REPORT:    Spiral CT of the head was performed without intravenous contrast.   Reconstructed coronal and sagittal images were also obtained. COMPARISON: CT head without contrast 8/12/2018. No evidence of intracranial hemorrhage, mass or mass-effect is   identified.  A small 4 mm focus of increased attenuation in the right   frontal lobe is only seen on the axial images, #20. This is most   likely artifact. There is some streak artifact at this level on the   reformatted coronal and sagittal images. The ventricles and basal   cisterns appear within normal limits. Bone windows are unremarkable.       Impression   Impression: No definite acute intracranial abnormality. Small 4 mm   focus of probable artifactually increased attenuation at the right   frontal lobe. Signed by Dr Howard Dallas on 5/28/2020 8:54 PM     Narrative   EXAMINATION: CT LUMBAR SPINE WO CONTRAST 5/28/2020 9:35 PM   HISTORY: Fall, low back pain. DOSE: 1988 mGycm. Automatic exposure control was utilized in an effort   to use as little radiation as possible, without compromising image   quality. REPORT: Spiral CT of the lumbar spine was performed without contrast,   reconstructed coronal and sagittal images are also reviewed. COMPARISON: There are no correlative imaging studies for comparison. Sagittal images demonstrate normal vertebral body alignment. No   fracture is identified. The disc spaces are preserved. The sacrum and   SI joints appear unremarkable. Soft tissue windows show a questionable   small central disc herniation at L3-4. Sebastian Sorrow No spinal canal stenosis is   identified. There is no obvious significant neural foraminal   narrowing. There is mild broad-based bulge of the disc L4-5 with   slight flattening of the ventral thecal sac. There is also bulging of   disc L5-S1, with a small superimposed central disc protrusion. The   paraspinal soft tissues are within normal limits.       Impression   1. No lumbar fracture is identified. 2. Questionable small central disc herniation at L3-4.   3. No significant spinal canal stenosis. 3. Bulging of the discs at L4-5 and L5-S1. There is also a possible   small superimposed central disc protrusion at L5-S1. Signed by Dr Howard Dallas on 5/28/2020 9:39 PM       IMPRESSION:    ICD-10-CM    1. Seizure disorder, intractable ([facility] G40.919    2. Sleep apnea, obstructive G47.33    I discussed the diagnosis, pathophysiology, symptoms, risks, prognosis, and treatment options of seizure disorder with [facility]. I discussed the diagnosis of obstructive sleep apnea with [facility] including the pathophysiology (namely the mechanism of breathing and obstruction of upper airway, interruptions of sleep, hypoxemia, hypercapnia, and results of repetitive sympathetic activation), risks, evaluation, and treatment options. I discussed the risks of driving when drowsy and advised that [facility] not drive when drowsy and avoid sedating medications and respiratory suppressants. I also discussed the laws regarding seizures and driving with him. PLAN:  1. Will arrange an MRI head  2. Will arrange an EEG  3. Increase the Topamax to 100 mg in the am and 150 mg in the pm for a week, then to 150 mg twice a day for a week, then to 150 mg in the am and 200 mg in the pm for a week, then to 200 mg twice a day. He was informed of potential side effects. 4. Use the CPAP during sleep  5. No driving until compliant with the [place] state law, seizure free for 3 months.     Hannah Hernandez M.D.

## 2020-07-17 ASSESSMENT — ENCOUNTER SYMPTOMS
BACK PAIN: 1
VOMITING: 0
NAUSEA: 0
PHOTOPHOBIA: 0
COUGH: 0
SHORTNESS OF BREATH: 0

## 2020-07-22 ENCOUNTER — HOSPITAL ENCOUNTER (OUTPATIENT)
Dept: NEUROLOGY | Age: 30
Discharge: HOME | End: 2020-07-22
Payer: MEDICAID

## 2020-07-22 ENCOUNTER — HOSPITAL ENCOUNTER (OUTPATIENT)
Dept: MRI IMAGING | Age: 30
Discharge: HOME | End: 2020-07-22
Payer: MEDICAID

## 2020-07-22 PROCEDURE — 95816 EEG AWAKE AND DROWSY: CPT | Performed by: PSYCHIATRY & NEUROLOGY

## 2020-07-22 PROCEDURE — 70551 MRI BRAIN STEM W/O DYE: CPT

## 2020-07-22 PROCEDURE — 95816 EEG AWAKE AND DROWSY: CPT

## 2020-07-23 ENCOUNTER — TELEPHONE (OUTPATIENT)
Dept: NEUROLOGY | Age: 30
End: 2020-07-23

## 2020-07-23 NOTE — TELEPHONE ENCOUNTER
----- Message from Haley Street MD sent at 7/22/2020 10:47 PM CDT -----  MRI head was normal aside from some chronic sinus inflammation.   EEG was normal.  Continue Topamax, call with any definite seizure

## 2020-07-24 NOTE — TELEPHONE ENCOUNTER
Spoke to patient and gave MRI and EEG was normal. Instructed to continue topamax and call office with any seizures.

## 2020-08-01 ENCOUNTER — APPOINTMENT (OUTPATIENT)
Dept: CT IMAGING | Age: 30
End: 2020-08-01
Payer: MEDICAID

## 2020-08-01 ENCOUNTER — HOSPITAL ENCOUNTER (EMERGENCY)
Age: 30
Discharge: HOME | End: 2020-08-01
Attending: EMERGENCY MEDICINE
Payer: MEDICAID

## 2020-08-01 VITALS
HEIGHT: 72 IN | WEIGHT: 310 LBS | RESPIRATION RATE: 17 BRPM | HEART RATE: 106 BPM | SYSTOLIC BLOOD PRESSURE: 124 MMHG | TEMPERATURE: 98 F | DIASTOLIC BLOOD PRESSURE: 86 MMHG | BODY MASS INDEX: 41.99 KG/M2 | OXYGEN SATURATION: 93 %

## 2020-08-01 LAB
ANION GAP SERPL CALCULATED.3IONS-SCNC: 17 MMOL/L (ref 7–19)
BUN BLDV-MCNC: 15 MG/DL (ref 6–20)
CALCIUM SERPL-MCNC: 9.5 MG/DL (ref 8.6–10)
CHLORIDE BLD-SCNC: 107 MMOL/L (ref 98–111)
CO2: 18 MMOL/L (ref 22–29)
CREAT SERPL-MCNC: 1 MG/DL (ref 0.5–1.2)
GFR AFRICAN AMERICAN: >59
GFR NON-AFRICAN AMERICAN: >60
GLUCOSE BLD-MCNC: 117 MG/DL (ref 74–109)
HCT VFR BLD CALC: 45.7 % (ref 42–52)
HEMOGLOBIN: 16 G/DL (ref 14–18)
MCH RBC QN AUTO: 29.5 PG (ref 27–31)
MCHC RBC AUTO-ENTMCNC: 35 G/DL (ref 33–37)
MCV RBC AUTO: 84.2 FL (ref 80–94)
PDW BLD-RTO: 13 % (ref 11.5–14.5)
PLATELET # BLD: 314 K/UL (ref 130–400)
PMV BLD AUTO: 10.7 FL (ref 9.4–12.4)
POTASSIUM REFLEX MAGNESIUM: 3.6 MMOL/L (ref 3.5–5)
RBC # BLD: 5.43 M/UL (ref 4.7–6.1)
SODIUM BLD-SCNC: 142 MMOL/L (ref 136–145)
WBC # BLD: 11.1 K/UL (ref 4.8–10.8)

## 2020-08-01 PROCEDURE — 99284 EMERGENCY DEPT VISIT MOD MDM: CPT

## 2020-08-01 PROCEDURE — 85027 COMPLETE CBC AUTOMATED: CPT

## 2020-08-01 PROCEDURE — 70450 CT HEAD/BRAIN W/O DYE: CPT

## 2020-08-01 PROCEDURE — 36415 COLL VENOUS BLD VENIPUNCTURE: CPT

## 2020-08-01 PROCEDURE — 80048 BASIC METABOLIC PNL TOTAL CA: CPT

## 2020-08-01 ASSESSMENT — ENCOUNTER SYMPTOMS
VOMITING: 0
SHORTNESS OF BREATH: 0
ABDOMINAL PAIN: 0
DIARRHEA: 0
EYE PAIN: 0

## 2020-08-01 NOTE — ED NOTES
Report given and care transferred to [facility], [address] [place] [address] Avenue, RN  08/01/20 2469

## 2020-08-01 NOTE — ED NOTES
Patient placed in a gown Patient placed on cardiac monitor, continuous pulse oximeter, and NIBP monitor. Monitor alarms on.        Janny Banda RN  08/01/20 0375

## 2020-08-01 NOTE — ED PROVIDER NOTES
[facility]  eMERGENCY dEPARTMENT eNCOUnter      Pt Name: Lennis Sicard  MRN: [id number]  [place] 1990  Date of evaluation: 8/1/2020  Provider: Andriy Oakes MD    [address] [place] [address]       Chief Complaint   Patient presents with    Seizures         HISTORY OF PRESENT ILLNESS   (Location/Symptom, Timing/Onset,Context/Setting, Quality, Duration, Modifying Factors, Severity)  Note limiting factors. Lennis Sicard is a 34 y.o. male who presents to the emergency department due to seizure. Patient has history of seizures. Followed by neurology. Takes Topamax. Did not sleep much last night. Felt some tingling in his face early this morning which is typical aura he gets before seizures. Was going to take his Topamax and went to the bathroom and fell and had a seizure. Said he thinks he wedged his head between a cabinet in the floor. Has a small abrasion to the right parietal area of his scalp and a mild headache. No vision changes numbness or weakness. Has some tingling in his face and hands and feet earlier which he said is typical before seizures. Other than mild headache he has no complaints at this time. Was given Ativan prior to arrival by EMS. Back to baseline now. No complaints at this time other than mild headache and fatigue. Said he feels a little drowsy from the Ativan he was given by EMS. Tetanus up-to-date. HPI    NursingNotes were reviewed. REVIEW OF SYSTEMS    (2-9 systems for level 4, 10 or more for level 5)     Review of Systems   Constitutional: Negative for fever. Eyes: Negative for pain and visual disturbance. Respiratory: Negative for shortness of breath. Cardiovascular: Negative for chest pain and palpitations. Gastrointestinal: Negative for abdominal pain, diarrhea and vomiting. Genitourinary: Negative for dysuria. Skin: Negative for rash. Neurological: Positive for seizures and headaches. Negative for weakness and numbness.    All other systems reviewed and are negative. A complete review of systems was performed and is negative except as noted above in the HPI.        PAST MEDICAL HISTORY     Past Medical History:   Diagnosis Date    Psoriasis     Seizure ([address]     Sleep apnea          SURGICAL HISTORY       Past Surgical History:   Procedure Laterality Date    TONSILLECTOMY AND ADENOIDECTOMY      TYMPANOSTOMY TUBE PLACEMENT Bilateral 1998         CURRENT MEDICATIONS       Previous Medications    OMEPRAZOLE (PRILOSEC) 20 MG DELAYED RELEASE CAPSULE    Take 1 capsule by mouth daily    TOPIRAMATE (TOPAMAX) 100 MG TABLET    Take 1 tablet by mouth 2 times daily    TOPIRAMATE (TOPAMAX) 100 MG TABLET    Take 2 tablets by mouth 2 times daily       ALLERGIES     Keppra [levetiracetam]    FAMILY HISTORY       Family History   Problem Relation Age of Onset    High Blood Pressure Father     High Cholesterol Father     Alcohol Abuse Father     Cancer Brother 29        testicular    Diabetes Maternal Uncle     Pancreatic Cancer Maternal Grandmother           SOCIAL HISTORY       Social History     Socioeconomic History    Marital status:      Spouse name: None    Number of children: None    Years of education: None    Highest education level: None   Occupational History    None   Social Needs    Financial resource strain: None    Food insecurity     Worry: None     Inability: None    Transportation needs     Medical: None     Non-medical: None   Tobacco Use    Smoking status: Never Smoker    Smokeless tobacco: Never Used   Substance and Sexual Activity    Alcohol use: Yes     Comment: occ    Drug use: No    Sexual activity: None   Lifestyle    Physical activity     Days per week: None     Minutes per session: None    Stress: None   Relationships    Social connections     Talks on phone: None     Gets together: None     Attends Yarsanism service: None     Active member of club or organization: None     Attends meetings of clubs or organizations: None     Relationship status: None    Intimate partner violence     Fear of current or ex partner: None     Emotionally abused: None     Physically abused: None     Forced sexual activity: None   Other Topics Concern    None   Social History Narrative    None       SCREENINGS             PHYSICAL EXAM    (up to 7 for level 4, 8 or more for level 5)     ED Triage Vitals [08/01/20 0551]   BP Temp Temp src Pulse Resp SpO2 Height Weight   (!) 143/108 98 °F (36.7 °C) -- 113 18 93 % 6' (1.829 m) (!) 310 lb (140.6 kg)       Physical Exam  Vitals signs reviewed. Constitutional:       General: He is not in acute distress. Appearance: He is well-developed. HENT:      Head: Normocephalic. Jaw: There is normal jaw occlusion. No tenderness. Comments: Tiny abrasion with dried blood to the right parietal region. Mild focal tenderness in this area. No laceration. Right Ear: Hearing, tympanic membrane, ear canal and external ear normal. No hemotympanum. Left Ear: Hearing, ear canal and external ear normal. No hemotympanum. Nose: Nose normal.      Mouth/Throat:      Mouth: Mucous membranes are moist.   Eyes:      General: No scleral icterus. Extraocular Movements: Extraocular movements intact. Pupils: Pupils are equal, round, and reactive to light. Neck:      Musculoskeletal: Normal range of motion and neck supple. Vascular: No JVD. Cardiovascular:      Rate and Rhythm: Normal rate and regular rhythm. Pulses: Normal pulses. Heart sounds: Normal heart sounds. No murmur. Pulmonary:      Effort: Pulmonary effort is normal. No respiratory distress. Breath sounds: Normal breath sounds. Abdominal:      General: There is no distension. Palpations: Abdomen is soft. Tenderness: There is no abdominal tenderness. There is no guarding or rebound. Musculoskeletal: Normal range of motion.          General: No swelling, tenderness, deformity or signs of injury. Comments: No C, T or L-spine tenderness or step-off. Skin:     General: Skin is warm and dry. Capillary Refill: Capillary refill takes less than 2 seconds. Neurological:      General: No focal deficit present. Mental Status: He is alert and oriented to person, place, and time. GCS: GCS eye subscore is 4. GCS verbal subscore is 5. GCS motor subscore is 6. Cranial Nerves: Cranial nerves are intact. Sensory: Sensation is intact. Motor: Motor function is intact. Psychiatric:         Mood and Affect: Mood normal.         Behavior: Behavior normal.         DIAGNOSTIC RESULTS     RADIOLOGY:   Non-plain film images such as CT, Ultrasound and MRI are read by the radiologist. [facility] images are visualized and preliminarily interpreted by the emergency physician with the below findings:    Interpretation per the Radiologist below, if available at the time of this note:    CT Head WO Contrast   Final Result   1. No acute intracranial process. Signed by Dr Stephanie Rojo on 8/1/2020 7:41 AM            LABS:  Labs Reviewed   CBC - Abnormal; Notable for the following components:       Result Value    WBC 11.1 (*)     All other components within normal limits   BASIC METABOLIC PANEL W/ REFLEX TO MG FOR LOW K - Abnormal; Notable for the following components:    CO2 18 (*)     Glucose 117 (*)     All other components within normal limits       All other labs were within normal range or not returned as of this dictation. EMERGENCY DEPARTMENT COURSE and DIFFERENTIALDIAGNOSIS/MDM:   Vitals:    Vitals:    08/01/20 0740 08/01/20 0631 08/01/20 0633 08/01/20 0700   BP:  (!) 126/93  124/86   Pulse: 112 109 106    Resp: 16 26 17    Temp:       SpO2: 95% 91% 93%    Weight:       Height:           MDM  Patient back to baseline. Tolerating oral intake. Ambulatory in the department. Patient instructed to follow seizure precautions.   Told not to drive until released by his neurologist.  Anup William to continue taking his medications as directed by his neurologist.  Kobe Held to follow-up with his neurologist, Dr. Rosa Jordan. Will DC with head injury precautions. Instructed return to the ER for change worsening symptoms or new concerns. CONSULTS:  None    PROCEDURES:  Unless otherwise notedbelow, none     Procedures    FINAL IMPRESSION     1. Seizure ([address]    2.  Injury of head, initial encounter          DISPOSITION/PLAN   DISPOSITION Decision To Discharge 08/01/2020 07:47:10 AM      PATIENT REFERRED TO:  @FUP@    DISCHARGE MEDICATIONS:  New Prescriptions    No medications on file          (Please note that portions of this note were completed with a voice recognition program.  Efforts were made to edit the dictations butoccasionally words are mis-transcribed.)    Rodolfo Carvajal MD (electronically signed)  AttendingEmergency Physician        Rodolfo Carvajal MD  08/01/20 [address] Sammie Corral MD  08/01/20 8898

## 2020-09-01 ENCOUNTER — OFFICE VISIT (OUTPATIENT)
Dept: NEUROLOGY | Age: 30
End: 2020-09-01
Payer: MEDICAID

## 2020-09-01 VITALS
RESPIRATION RATE: 18 BRPM | BODY MASS INDEX: 41.85 KG/M2 | HEIGHT: 72 IN | SYSTOLIC BLOOD PRESSURE: 138 MMHG | DIASTOLIC BLOOD PRESSURE: 88 MMHG | HEART RATE: 92 BPM | WEIGHT: 309 LBS

## 2020-09-01 PROCEDURE — G8427 DOCREV CUR MEDS BY ELIG CLIN: HCPCS | Performed by: PSYCHIATRY & NEUROLOGY

## 2020-09-01 PROCEDURE — G8417 CALC BMI ABV UP PARAM F/U: HCPCS | Performed by: PSYCHIATRY & NEUROLOGY

## 2020-09-01 PROCEDURE — 1036F TOBACCO NON-USER: CPT | Performed by: PSYCHIATRY & NEUROLOGY

## 2020-09-01 PROCEDURE — 99213 OFFICE O/P EST LOW 20 MIN: CPT | Performed by: PSYCHIATRY & NEUROLOGY

## 2020-09-01 NOTE — PROGRESS NOTES
[facility]  [address], [facility] [address]  [facility], [address]  Phone [phone]  Fax [phone]     [facility] Follow Up Encounter  20 2:11 PM CDT    Information:   Patient Name: Kassandra Arvizu  :   1990  Age:   34 y.o. MRN:   [id number]  Account #:  [de-identified]  Today:  20    Provider: Alexys Workman M.D. Chief Complaint:   Chief Complaint   Patient presents with    Follow-up    Sleep Apnea    Seizures       Subjective:   Kassandra Arvizu is a 34 y.o. man with a history of seizures and obstructive sleep apnea who is following up. He is taking Topamax 200 mg BID. He tolerates fairly well. He has previously had side effects to Keppra. He has had no seizures since his last appointment but has had some auras, brief feelings like he is going to have a seizure. He says he is using his CPAP nightly and rests well. Objective:     Past Medical History:  Past Medical History:   Diagnosis Date    Psoriasis     Seizure ([address]     Sleep apnea        Past Surgical History:   Procedure Laterality Date    TONSILLECTOMY AND ADENOIDECTOMY      TYMPANOSTOMY TUBE PLACEMENT Bilateral        Recent Hospitalizations  · None    Significant Injuries  · None    Habits  Kassandra Arvizu reports that he has never smoked. He has never used smokeless tobacco. He reports current alcohol use. He reports that he does not use drugs. Family History   Problem Relation Age of Onset    High Blood Pressure Father     High Cholesterol Father     Alcohol Abuse Father     Cancer Brother 29        testicular    Diabetes Maternal Uncle     Pancreatic Cancer Maternal Grandmother        Social History  Kassandra Arvizu is , lives in [facility], [facility], and works at [employer]. 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     Medications:  Current Outpatient Medications   Medication Sig Dispense Refill    topiramate (TOPAMAX) 100 MG tablet Take 2 tablets by mouth 2 times daily 120 tablet 5    auscultation. Breathing appears normal, good expansion, normal effort without use of accessory muscles  Musculoskeletal:  Joints are normal.  No splints, slings, or casts. Spine appears normal with normal posture and range of motion. Integument:  No rash, erythema, or pallor  Psychiatric:  Mood, affect, and behavior appear normal      NEUROLOGIC EXAMINATION:  Mental Status:  alert, oriented to person, place, and time. Speech:  Clear without dysarthria or dysphonia  Language:  Fluent without aphasia  Cranial Nerves:   II Visual fields are full to confrontation   III,IV, VI Extraocular movements are full   VII Facial movements are symmetrical without weakness   VIII Hearing is intact   IX,X Shoulder shrug and head rotation strength are intact   XII No tongue atrophy or fasciculations. Normal tongue protrusion. No tongue weakness  Motor:  Normal strength in both upper and lower extremities. Normal muscle tone and bulk. Deep tendon reflexes are intact and symmetrical in both upper and lower extremities. Pearl's signs are absent bilaterally. There is no ankle clonus on either side. Coordination:  Rapid alternating movements are normal in both upper and lower extremities. Finger to nose testing is unimpaired bilaterally. Gait:  Normal station and gait. Pertinent Diagnostic Studies:  Narrative    Examination. MRI BRAIN WO CONTRAST 7/22/2020 11:08 AM    History: Seizure disorder. History of head injury. The multiplanar, multisequence MR imaging of the brain is performed    without intravenous contrast enhancement. The targeted images of the    mesial temporal lobe are obtained. The comparison is made with the previous study dated 12/14/2010. The    correlation is made with the previous CT scan of the head dated    5/28/2020. The diffusion-weighted imaging sequence including the ADC map show no    areas of restricted diffusion.     The targeted images of the mesial temporal lobe bilaterally show normal and symmetrical hippocampal nuclei and amygdala. No evidence of    an asymmetrical left atrophy or hypertrophy. No abnormal signal. The    mamillary body and the fornix are normal and symmetrical.    The ventricles, the basal cisterns and the cortical sulci are normal.    The orbits, the globes, the optic nerves, the optic chiasm and optic    tract bilaterally are normal and symmetrical.    The visualized intracranial nerves (third, fifth, seventh and eighth)    are normal and symmetrical. The pituitary gland is normal. The corpus    callosum, the brainstem and cerebellum are normal.    The FLAIR sequence images show no white matter signal abnormalities. The gradient recalled imaging sequence show normal flow/flow void in    the visualized intracranial vessels. Moderate mucosal thickening    involving the ethmoid and maxillary sinuses. The mastoid air cells are    clear.         Impression    No acute intracranial abnormality. No finding to explain    patient's seizure disorder. Ethmoid and maxillary sinusitis. Signed by Dr Rosio Lopez on 7/22/2020 2:12 PM      EEG was normal.    CPAP download shows that he uses CPAP at 8cm to 20cm only 53% of nights and only 30% over 4 hours. His residual AHI is 1.2 and large leak is good. Assessment:       ICD-10-CM    1. Seizure disorder, intractable ([facility] [address]  G40.919    2. Sleep apnea, obstructive  G47.33    I discussed the diagnosis, pathophysiology, symptoms, risks, prognosis, and treatment options of seizures with Kassandra Arvizu. He was advised to refrain from heights, swimming or bathing alone, open flames. I also discussed the laws regarding seizures and driving with him.     I discussed the diagnosis of obstructive sleep apnea with Kassandra Arvizu including the pathophysiology (namely the mechanism of breathing and obstruction of upper airway, interruptions of sleep, hypoxemia, hypercapnia, and results of repetitive sympathetic activation), risks, evaluation, and treatment options. I discussed the risks of driving when drowsy and advised that Bob Abbasi not drive when drowsy and avoid sedating medications and respiratory suppressants. Plan:   1. Continue Topamax. 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 Consider changing to Lamictal in future. 2. He was advised to use his CPAP more  3. Call with any seizure  4.  FU in 6 months    Electronically signed by Mariela Modi MD on 9/1/20

## 2020-12-14 ENCOUNTER — HOSPITAL ENCOUNTER (EMERGENCY)
Age: 30
Discharge: HOME | End: 2020-12-14
Attending: EMERGENCY MEDICINE
Payer: MEDICAID

## 2020-12-14 VITALS
HEIGHT: 72 IN | TEMPERATURE: 98 F | OXYGEN SATURATION: 98 % | HEART RATE: 80 BPM | DIASTOLIC BLOOD PRESSURE: 78 MMHG | SYSTOLIC BLOOD PRESSURE: 132 MMHG | RESPIRATION RATE: 20 BRPM | BODY MASS INDEX: 41.99 KG/M2 | WEIGHT: 310 LBS

## 2020-12-14 LAB
ADENOVIRUS BY PCR: NOT DETECTED
BORDETELLA PARAPERTUSSIS BY PCR: NOT DETECTED
BORDETELLA PERTUSSIS BY PCR: NOT DETECTED
CHLAMYDOPHILIA PNEUMONIAE BY PCR: NOT DETECTED
CORONAVIRUS 229E BY PCR: NOT DETECTED
CORONAVIRUS HKU1 BY PCR: NOT DETECTED
CORONAVIRUS NL63 BY PCR: NOT DETECTED
CORONAVIRUS OC43 BY PCR: NOT DETECTED
HUMAN METAPNEUMOVIRUS BY PCR: NOT DETECTED
HUMAN RHINOVIRUS/ENTEROVIRUS BY PCR: NOT DETECTED
INFLUENZA A BY PCR: NOT DETECTED
INFLUENZA B BY PCR: NOT DETECTED
MYCOPLASMA PNEUMONIAE BY PCR: NOT DETECTED
PARAINFLUENZA VIRUS 1 BY PCR: NOT DETECTED
PARAINFLUENZA VIRUS 2 BY PCR: NOT DETECTED
PARAINFLUENZA VIRUS 3 BY PCR: NOT DETECTED
PARAINFLUENZA VIRUS 4 BY PCR: NOT DETECTED
RESPIRATORY SYNCYTIAL VIRUS BY PCR: NOT DETECTED
SARS-COV-2, PCR: DETECTED

## 2020-12-14 PROCEDURE — 99999 PR OFFICE/OUTPT VISIT,PROCEDURE ONLY: CPT | Performed by: EMERGENCY MEDICINE

## 2020-12-14 PROCEDURE — 99285 EMERGENCY DEPT VISIT HI MDM: CPT

## 2020-12-14 PROCEDURE — 0202U NFCT DS 22 TRGT SARS-COV-2: CPT

## 2020-12-14 ASSESSMENT — ENCOUNTER SYMPTOMS
VOMITING: 0
TROUBLE SWALLOWING: 0
SHORTNESS OF BREATH: 1
COLOR CHANGE: 0
SORE THROAT: 1
SINUS PRESSURE: 0
WHEEZING: 0
ABDOMINAL PAIN: 0
EYE PAIN: 0
CHEST TIGHTNESS: 0
BACK PAIN: 0
COUGH: 1
CONSTIPATION: 0
DIARRHEA: 0
NAUSEA: 0
PHOTOPHOBIA: 0

## 2020-12-14 NOTE — ED PROVIDER NOTES
[facility] EMERGENCY DEPT  eMERGENCY dEPARTMENT eNCOUnter      Pt Name: Sanjuanita Cheek  MRN: [id number]  [place] 1990  Date of evaluation: 12/14/2020  Provider: Jori Bernal MD    [address]       Chief Complaint   Patient presents with    Pharyngitis     x 2 days    Shortness of Breath         HISTORY OF PRESENT ILLNESS   (Location/Symptom, Timing/Onset,Context/Setting, Quality, Duration, Modifying Factors, Severity)  Note limiting factors. Sanjuanita Cheek is a 27 y.o. male who presents to the emergency department for sore throat, shortness of breath and cough. Patient began feeling bad roughly 3 days ago. He reports a temperature, but is afebrile at this time. He tells me that he feels \"awful\". Patient is unsure if he has been exposed to a SARS-CoV-2, but feels like he needs a test since he feels so bad. He describes it his life he has had the flu. HPI    NursingNotes were reviewed. REVIEW OF SYSTEMS    (2-9 systems for level 4, 10 or more for level 5)     Review of Systems   Constitutional: Positive for fatigue and fever. HENT: Positive for congestion and sore throat. Negative for drooling, sinus pressure and trouble swallowing. Eyes: Negative for photophobia, pain and visual disturbance. Respiratory: Positive for cough and shortness of breath. Negative for chest tightness and wheezing. Cardiovascular: Negative for chest pain, palpitations and leg swelling. Gastrointestinal: Negative for abdominal pain, constipation, diarrhea, nausea and vomiting. Genitourinary: Negative for dysuria, flank pain and urgency. Musculoskeletal: Positive for myalgias. Negative for arthralgias, back pain, neck pain and neck stiffness. Skin: Negative for color change, pallor and rash. Neurological: Negative for dizziness, facial asymmetry, speech difficulty, weakness, light-headedness, numbness and headaches.    Psychiatric/Behavioral: Negative for agitation, confusion, hallucinations and suicidal ideas. The patient is not nervous/anxious.              PAST MEDICALHISTORY     Past Medical History:   Diagnosis Date    Obesity     Psoriasis     Seizure ([address]     Sleep apnea          SURGICAL HISTORY       Past Surgical History:   Procedure Laterality Date    TONSILLECTOMY AND ADENOIDECTOMY      TYMPANOSTOMY TUBE PLACEMENT Bilateral 1998         CURRENT MEDICATIONS     Previous Medications    TOPIRAMATE (TOPAMAX) 100 MG TABLET    Take 2 tablets by mouth 2 times daily       ALLERGIES     Keppra [levetiracetam]    FAMILY HISTORY       Family History   Problem Relation Age of Onset    High Blood Pressure Father     High Cholesterol Father     Alcohol Abuse Father     Cancer Brother 29        testicular    Diabetes Maternal Uncle     Pancreatic Cancer Maternal Grandmother           SOCIAL HISTORY       Social History     Socioeconomic History    Marital status:      Spouse name: None    Number of children: None    Years of education: None    Highest education level: None   Occupational History    None   Social Needs    Financial resource strain: None    Food insecurity     Worry: None     Inability: None    Transportation needs     Medical: None     Non-medical: None   Tobacco Use    Smoking status: Never Smoker    Smokeless tobacco: Never Used   Substance and Sexual Activity    Alcohol use: Yes     Comment: occ    Drug use: No    Sexual activity: Not Currently     Partners: Female   Lifestyle    Physical activity     Days per week: None     Minutes per session: None    Stress: None   Relationships    Social connections     Talks on phone: None     Gets together: None     Attends Moravian service: None     Active member of club or organization: None     Attends meetings of clubs or organizations: None     Relationship status: None    Intimate partner violence     Fear of current or ex partner: None     Emotionally abused: None     Physically abused: None     Forced sexual activity: None   Other Topics Concern    None   Social History Narrative    None       SCREENINGS    Christopher Coma Scale  Eye Opening: Spontaneous  Best Verbal Response: Oriented  Best Motor Response: Obeys commands  Christopher Coma Scale Score: 15        PHYSICAL EXAM    (up to 7 for level 4, 8 or more for level 5)     ED Triage Vitals [12/14/20 0346]   BP Temp Temp src Pulse Resp SpO2 Height Weight   (!) 151/92 98 °F (36.7 °C) -- 92 20 95 % 6' (1.829 m) (!) 310 lb (140.6 kg)       Physical Exam  Vitals signs and nursing note reviewed. Constitutional:       General: He is not in acute distress. Appearance: He is well-developed. HENT:      Head: Normocephalic and atraumatic. Nose: Nose normal.      Mouth/Throat:      Mouth: Mucous membranes are moist.      Pharynx: Oropharynx is clear. Eyes:      Conjunctiva/sclera: Conjunctivae normal.      Pupils: Pupils are equal, round, and reactive to light. Neck:      Musculoskeletal: Normal range of motion and neck supple. Cardiovascular:      Rate and Rhythm: Normal rate and regular rhythm. Heart sounds: Normal heart sounds. No murmur. Pulmonary:      Effort: Pulmonary effort is normal.      Breath sounds: Normal breath sounds. No wheezing or rales. Abdominal:      General: Bowel sounds are normal. There is no distension. Palpations: Abdomen is soft. Tenderness: There is no abdominal tenderness. There is no guarding or rebound. Musculoskeletal: Normal range of motion. Skin:     General: Skin is warm and dry. Capillary Refill: Capillary refill takes less than 2 seconds. Neurological:      General: No focal deficit present. Mental Status: He is alert and oriented to person, place, and time. Cranial Nerves: No cranial nerve deficit. Psychiatric:         Mood and Affect: Mood normal.         Behavior: Behavior normal.         Thought Content:  Thought content normal.         DIAGNOSTIC RESULTS     EKG: All EKG's areinterpreted by the Emergency Department Physician who either signs or Co-signs this chart in the absence of a cardiologist.        RADIOLOGY:  Non-plain film images such as CT, Ultrasound and MRI are read by the radiologist. Plain radiographic images are visualized and preliminarily interpreted bythe emergency physician with the below findings:          No orders to display           LABS:  Labs Reviewed   RESPIRATORY PANEL, MOLECULAR, WITH COVID-19 - Abnormal; Notable for the following components:       Result Value    SARS-CoV-2, PCR DETECTED (*)     All other components within normal limits    Narrative:     [address] ,  Microbiology results called to and read back by Lavern Fields RN in ED,  12/14/2020 04:53, by [facility]       All other labs were within normal range or not returned as of this dictation. EMERGENCY DEPARTMENT COURSE and DIFFERENTIAL DIAGNOSIS/MDM:   Vitals:    Vitals:    12/14/20 0346 12/14/20 0350 12/14/20 0411 12/14/20 0550   BP: (!) 151/92  136/78 132/78   Pulse: 92 100 78 80   Resp: 20  20 20   Temp: 98 °F (36.7 °C)      SpO2: 95%  95% 98%   Weight: (!) 310 lb (140.6 kg)      Height: 6' (1.829 m)          MDM  Number of Diagnoses or Management Options  COVID-19: new and requires workup  Diagnosis management comments: Patient tested positive for SARS-CoV-2. His vital signs are stable. I advised him that he needs to quarantine. He asked if he should keep his kids home to since there is risk goes to the grandparents are in their [de-identified]. I advised him that he should keep his children at home. Also advised him he needs to stay home from work. After careful review of history, physical, and supporting data, there is very low suspicion for a life or limb threatening cause of the patients sxs. The patient's symptoms have improved from presentation and appears clinically well. Patient reexamined prior to discharge and appears improved.  Patient has been encouraged to follow up with his/her PCP and to return to the ED with any lack of improvement or worsening symptoms. The patient';s questions regarding the work up and plan were invited and answered. The patient/guardian states understanding and agreement with the plan.      Patient Progress  Patient progress: stable      Reassessment      CONSULTS:  None    PROCEDURES:  Unless otherwise noted below, none     Procedures    FINAL IMPRESSION      1. COVID-19          DISPOSITION/PLAN   DISPOSITION Decision To Discharge 12/14/2020 06:02:30 AM      PATIENT REFERRED TO:  HARVINDER Henry - NP  [address] [phone]    Call in 3 days        DISCHARGE MEDICATIONS:  New Prescriptions    No medications on file          (Please note that portions of this note were completed with a voice recognition program.  Efforts were made to edit thedictations but occasionally words are mis-transcribed.)    Chica Quintana MD (electronically signed)  Attending Emergency Physician          Mikki Araujo MD  12/14/20 [id number]

## 2020-12-15 ENCOUNTER — CARE COORDINATION (OUTPATIENT)
Dept: CARE COORDINATION | Age: 30
End: 2020-12-15

## 2020-12-15 NOTE — CARE COORDINATION
was given an opportunity for questions and concerns. The patient agrees to contact the [employer] exposure line [phone], local health department [facility]: ([phone]) and PCP office for questions related to their healthcare. CTN/ACM provided contact information for future needs. Reviewed and educated patient on any new and changed medications related to discharge diagnosis     Patient/family/caregiver given information for GetWell Loop and agrees to enroll yes  Patient's preferred e-mail: [email or web address]   Patient's preferred phone number: [phone]  Based on Loop alert triggers, patient will be contacted by nurse care manager for worsening symptoms. Pt will be further monitored by COVID Loop Team based on severity of symptoms and risk factors. Spoke with Ria Villanueva via phone call for ED f/u. He reports the above symptoms. He states he was concerned d/t his PMH of asthma and PAULA, for which he uses CPAP. Enc him to continue using his machine, as this will help prevent any respiratory deterioration. He currently is not experiencing any SOB or cardiac symptoms. He also has a hx of seizures and states that he has had 2 episodes since onset of symptoms; enc him to keep tight control of his temp to prevent any seizures induced by fever. He does report generalized body aches as well as R flank pain. We discussed monitoring urine output and pushing fluids to prevent IGLESIA. Advised for a f/u with PCP as they may want labwork to evaluate kidney fxn if decreased output persists. When offering f/u appt, he declined stating he plans to switch doctors d/t personal reasons. He has seen Dr. Melida Lindsey in the past.  Rec that he either establish again with Dr. Melida Lindsey, also rec MEG and enc him to reach out if he needed any further information. He is concerned about his children having to be quarantined with him.   Discussed precautions to take to prevent spread of infection to his children including cough/hand hygiene and frequent home sanitization. Discussed risk factors for COVID-19, symptoms to watch for, cough/hand hygiene, home sanitization, and social distancing. Discussed ED return parameters and advised when to seek treatment. Pt verbalized understanding.

## 2020-12-23 ENCOUNTER — TELEPHONE (OUTPATIENT)
Dept: PRIMARY CARE CLINIC | Age: 30
End: 2020-12-23

## 2021-03-10 ENCOUNTER — TELEPHONE (OUTPATIENT)
Dept: NEUROLOGY | Age: 31
End: 2021-03-10

## 2021-03-10 NOTE — TELEPHONE ENCOUNTER
Called patient to confirm appointment with Dr. Emmett Lr, patient wanted to reschedule his appt, he is aware of the new appointment time and date.

## 2021-03-15 ENCOUNTER — APPOINTMENT (OUTPATIENT)
Dept: GENERAL RADIOLOGY | Age: 31
End: 2021-03-15
Payer: MEDICAID

## 2021-03-15 ENCOUNTER — HOSPITAL ENCOUNTER (EMERGENCY)
Age: 31
Discharge: HOME | End: 2021-03-15
Attending: EMERGENCY MEDICINE
Payer: MEDICAID

## 2021-03-15 VITALS
DIASTOLIC BLOOD PRESSURE: 74 MMHG | SYSTOLIC BLOOD PRESSURE: 144 MMHG | HEART RATE: 72 BPM | RESPIRATION RATE: 16 BRPM | OXYGEN SATURATION: 97 % | WEIGHT: 315 LBS | BODY MASS INDEX: 42.72 KG/M2 | TEMPERATURE: 98.1 F

## 2021-03-15 DIAGNOSIS — M54.50 ACUTE LOW BACK PAIN WITHOUT SCIATICA, UNSPECIFIED BACK PAIN LATERALITY: ICD-10-CM

## 2021-03-15 DIAGNOSIS — R56.9 SEIZURE (HCC): Primary | ICD-10-CM

## 2021-03-15 DIAGNOSIS — Z86.69 HISTORY OF SEIZURE DISORDER: ICD-10-CM

## 2021-03-15 PROCEDURE — 72100 X-RAY EXAM L-S SPINE 2/3 VWS: CPT

## 2021-03-15 PROCEDURE — 99283 EMERGENCY DEPT VISIT LOW MDM: CPT

## 2021-03-15 ASSESSMENT — ENCOUNTER SYMPTOMS
EYE REDNESS: 0
VOICE CHANGE: 0
EYE PAIN: 0
ABDOMINAL PAIN: 0
COUGH: 0
RHINORRHEA: 0
DIARRHEA: 0
BACK PAIN: 1
VOMITING: 0
SHORTNESS OF BREATH: 0

## 2021-03-15 NOTE — ED PROVIDER NOTES
[facility] EMERGENCY DEPT  EMERGENCY DEPARTMENT ENCOUNTER      Pt Name: Mary Anne Sloan  MRN: [id number]  [place] 1990  Date of evaluation: 3/15/2021  Provider: Hanny Boyd MD    CHIEF COMPLAINT       Chief Complaint   Patient presents with    Seizures         HISTORY OF PRESENT ILLNESS   (Location/Symptom, Timing/Onset,Context/Setting, Quality, Duration, Modifying Factors, Severity)  Note limiting factors. Mary Anne Sloan is a 27 y.o. male who presents to the emergency department for evaluation after a seizure episode. Patient has a history of seizure disorder since childhood. Has been on several medications including Dilantin and Keppra. Most recently has been on Topamax for several years but stopped taking it several months ago and started using CBD oil. States he had been doing well and not had a seizure for 8 or 9 months. States he was driving his car and started to feel unwell in the field and gradually worsening for several minutes. He pulled in the hospital parking lot then feels like he must of gotten disoriented because he ended up in the main lobby. States he blacked out. Witnesses reported seizure-like activity. Patient was then brought to the emergency department for evaluation. His only complaint at this time is low back pain since the episode. States he has a history of low back issues he sees a chiropractor for but was not hurting like this since the episode this morning. HPI    NursingNotes were reviewed. REVIEW OF SYSTEMS    (2-9 systems for level 4, 10 or more for level 5)     Review of Systems   Constitutional: Negative for fatigue and fever. HENT: Negative for congestion, rhinorrhea and voice change. Eyes: Negative for pain and redness. Respiratory: Negative for cough and shortness of breath. Cardiovascular: Negative for chest pain. Gastrointestinal: Negative for abdominal pain, diarrhea and vomiting. Endocrine: Negative. Genitourinary: Negative. activity: Not Currently     Partners: Female   Lifestyle    Physical activity     Days per week: Not on file     Minutes per session: Not on file    Stress: Not on file   Relationships    Social connections     Talks on phone: Not on file     Gets together: Not on file     Attends Methodist service: Not on file     Active member of club or organization: Not on file     Attends meetings of clubs or organizations: Not on file     Relationship status: Not on file    Intimate partner violence     Fear of current or ex partner: Not on file     Emotionally abused: Not on file     Physically abused: Not on file     Forced sexual activity: Not on file   Other Topics Concern    Not on file   Social History Narrative    Not on file       SCREENINGS    Sanborn Coma Scale  Eye Opening: Spontaneous  Best Verbal Response: Oriented  Best Motor Response: Obeys commands  Christopher Coma Scale Score: 15        PHYSICAL EXAM    (up to 7 for level 4, 8 or more for level 5)     ED Triage Vitals [03/15/21 1236]   BP Temp Temp src Pulse Resp SpO2 Height Weight   (!) 161/78 97.8 °F (36.6 °C) -- 85 16 92 % -- (!) 315 lb (142.9 kg)       Physical Exam  Vitals signs and nursing note reviewed. Constitutional:       General: He is not in acute distress. Appearance: He is well-developed. He is not toxic-appearing or diaphoretic. HENT:      Head: Normocephalic and atraumatic. Eyes:      General: No scleral icterus. Right eye: No discharge. Left eye: No discharge. Pupils: Pupils are equal, round, and reactive to light. Neck:      Musculoskeletal: Normal range of motion. Cardiovascular:      Rate and Rhythm: Normal rate and regular rhythm. Pulmonary:      Effort: Pulmonary effort is normal. No respiratory distress. Breath sounds: No stridor. Abdominal:      General: There is no distension. Musculoskeletal: Normal range of motion. General: No deformity.    Skin:     General: Skin is warm and dry.   Neurological:      General: No focal deficit present. Mental Status: He is alert and oriented to person, place, and time. GCS: GCS eye subscore is 4. GCS verbal subscore is 5. GCS motor subscore is 6. Cranial Nerves: No cranial nerve deficit. Motor: No abnormal muscle tone. Psychiatric:         Behavior: Behavior normal.         Thought Content: Thought content normal.         Judgment: Judgment normal.         DIAGNOSTIC RESULTS     EKG: All EKG's are interpreted by the Emergency Department Physician who either signs or Co-signs this chart in the absence of a cardiologist.      RADIOLOGY:   Non-plain film images such as CT, Ultrasound and MRI are read by the radiologist. Eve Peck images are visualized and preliminarily interpreted by the emergency physician with the below findings:        Interpretation per the Radiologist below, if available at the time of this note:    XR LUMBAR SPINE (2-3 VIEWS)   Final Result   1. No acute bony abnormality is seen. Signed by Dr Douglas Dye on 3/15/2021 1:34 PM            ED BEDSIDE ULTRASOUND:   Performed by ED Physician - none    LABS:  Labs Reviewed - No data to display    All other labs were within normal range or not returned as of this dictation. Medications - No data to display    [address] [place] [address] and DIFFERENTIALDIAGNOSIS/MDM:   Vitals:    Vitals:    03/15/21 1236 03/15/21 1407   BP: (!) 161/78 (!) 144/74   Pulse: 85 72   Resp: 16 16   Temp: 97.8 °F (36.6 °C) 98.1 °F (36.7 °C)   TempSrc:  Oral   SpO2: 92% 97%   Weight: (!) 315 lb (142.9 kg)        MDM    ED Course as of Mar 15 1510   Mon Mar 15, 2021   1356 Patient not back to normal mental status on evaluation here. X-rays of the back and examination show no signs of traumatic injury from the seizure episode. Patient hemodynamically stable.   Discussed further management options and patient is agreeable to titrating back up on his Topamax until he can

## 2021-03-15 NOTE — ED NOTES
Pt states he hasn't taken his Topamax in a long time. State he doesn't take it because of the side effects. Pt awake, alert.   Appropriate, not post ictal     Andrew Conteh RN  03/15/21 5219

## 2021-05-28 ENCOUNTER — OFFICE VISIT (OUTPATIENT)
Dept: FAMILY MEDICINE CLINIC | Facility: CLINIC | Age: 31
End: 2021-05-28

## 2021-05-28 ENCOUNTER — LAB (OUTPATIENT)
Dept: LAB | Facility: HOSPITAL | Age: 31
End: 2021-05-28

## 2021-05-28 VITALS
TEMPERATURE: 97.6 F | RESPIRATION RATE: 20 BRPM | DIASTOLIC BLOOD PRESSURE: 76 MMHG | HEIGHT: 71 IN | WEIGHT: 315 LBS | SYSTOLIC BLOOD PRESSURE: 132 MMHG | BODY MASS INDEX: 44.1 KG/M2 | HEART RATE: 88 BPM

## 2021-05-28 DIAGNOSIS — Z12.5 PROSTATE CANCER SCREENING: ICD-10-CM

## 2021-05-28 DIAGNOSIS — Z11.59 ENCOUNTER FOR HEPATITIS C SCREENING TEST FOR LOW RISK PATIENT: ICD-10-CM

## 2021-05-28 DIAGNOSIS — G47.33 OBSTRUCTIVE SLEEP APNEA SYNDROME: ICD-10-CM

## 2021-05-28 DIAGNOSIS — Z00.00 WELLNESS EXAMINATION: ICD-10-CM

## 2021-05-28 DIAGNOSIS — N52.8 OTHER MALE ERECTILE DYSFUNCTION: ICD-10-CM

## 2021-05-28 DIAGNOSIS — R56.9 SEIZURES (HCC): ICD-10-CM

## 2021-05-28 DIAGNOSIS — Z00.00 WELLNESS EXAMINATION: Primary | ICD-10-CM

## 2021-05-28 LAB
ALBUMIN SERPL-MCNC: 4.3 G/DL (ref 3.5–5)
ALBUMIN/GLOB SERPL: 1.7 G/DL (ref 1.1–2.5)
ALP SERPL-CCNC: 55 U/L (ref 24–120)
ALT SERPL W P-5'-P-CCNC: 57 U/L (ref 0–50)
ANION GAP SERPL CALCULATED.3IONS-SCNC: 12 MMOL/L (ref 4–13)
AST SERPL-CCNC: 38 U/L (ref 7–45)
AUTO MIXED CELLS #: 0.6 10*3/MM3 (ref 0.1–2.6)
AUTO MIXED CELLS %: 7.3 % (ref 0.1–24)
BILIRUB SERPL-MCNC: 0.5 MG/DL (ref 0.1–1)
BILIRUB UR QL STRIP: NEGATIVE
BUN SERPL-MCNC: 13 MG/DL (ref 5–21)
BUN/CREAT SERPL: 13.7
CALCIUM SPEC-SCNC: 9.2 MG/DL (ref 8.4–10.4)
CHLORIDE SERPL-SCNC: 105 MMOL/L (ref 98–110)
CHOLEST SERPL-MCNC: 166 MG/DL (ref 130–200)
CLARITY UR: CLEAR
CO2 SERPL-SCNC: 25 MMOL/L (ref 24–31)
COLOR UR: YELLOW
CREAT SERPL-MCNC: 0.95 MG/DL (ref 0.5–1.4)
ERYTHROCYTE [DISTWIDTH] IN BLOOD BY AUTOMATED COUNT: 12.3 % (ref 12.3–15.4)
GFR SERPL CREATININE-BSD FRML MDRD: 93 ML/MIN/1.73
GLOBULIN UR ELPH-MCNC: 2.5 GM/DL
GLUCOSE SERPL-MCNC: 92 MG/DL (ref 70–100)
GLUCOSE UR STRIP-MCNC: NEGATIVE MG/DL
HCT VFR BLD AUTO: 42 % (ref 37.5–51)
HDLC SERPL-MCNC: 37 MG/DL
HGB BLD-MCNC: 14.5 G/DL (ref 13–17.7)
HGB UR QL STRIP.AUTO: NEGATIVE
KETONES UR QL STRIP: NEGATIVE
LDLC SERPL CALC-MCNC: 78 MG/DL (ref 0–99)
LDLC/HDLC SERPL: 1.79 {RATIO}
LEUKOCYTE ESTERASE UR QL STRIP.AUTO: NEGATIVE
LYMPHOCYTES # BLD AUTO: 3 10*3/MM3 (ref 0.7–3.1)
LYMPHOCYTES NFR BLD AUTO: 39 % (ref 19.6–45.3)
MCH RBC QN AUTO: 29.4 PG (ref 26.6–33)
MCHC RBC AUTO-ENTMCNC: 34.5 G/DL (ref 31.5–35.7)
MCV RBC AUTO: 85.2 FL (ref 79–97)
NEUTROPHILS NFR BLD AUTO: 4.1 10*3/MM3 (ref 1.7–7)
NEUTROPHILS NFR BLD AUTO: 53.7 % (ref 42.7–76)
NITRITE UR QL STRIP: NEGATIVE
PH UR STRIP.AUTO: 6 [PH] (ref 5–8)
PLATELET # BLD AUTO: 285 10*3/MM3 (ref 140–450)
PMV BLD AUTO: 9.3 FL (ref 6–12)
POTASSIUM SERPL-SCNC: 3.7 MMOL/L (ref 3.5–5.3)
PROT SERPL-MCNC: 6.8 G/DL (ref 6.3–8.7)
PROT UR QL STRIP: NEGATIVE
RBC # BLD AUTO: 4.93 10*6/MM3 (ref 4.14–5.8)
SODIUM SERPL-SCNC: 142 MMOL/L (ref 135–145)
SP GR UR STRIP: >=1.03 (ref 1–1.03)
TRIGL SERPL-MCNC: 313 MG/DL (ref 0–149)
UROBILINOGEN UR QL STRIP: NORMAL
VLDLC SERPL-MCNC: 51 MG/DL (ref 5–40)
WBC # BLD AUTO: 7.7 10*3/MM3 (ref 3.4–10.8)

## 2021-05-28 PROCEDURE — 81003 URINALYSIS AUTO W/O SCOPE: CPT

## 2021-05-28 PROCEDURE — 99214 OFFICE O/P EST MOD 30 MIN: CPT | Performed by: NURSE PRACTITIONER

## 2021-05-28 PROCEDURE — G0103 PSA SCREENING: HCPCS

## 2021-05-28 PROCEDURE — 80061 LIPID PANEL: CPT

## 2021-05-28 PROCEDURE — 83002 ASSAY OF GONADOTROPIN (LH): CPT

## 2021-05-28 PROCEDURE — 83001 ASSAY OF GONADOTROPIN (FSH): CPT

## 2021-05-28 PROCEDURE — 36415 COLL VENOUS BLD VENIPUNCTURE: CPT

## 2021-05-28 PROCEDURE — 86803 HEPATITIS C AB TEST: CPT

## 2021-05-28 PROCEDURE — 84402 ASSAY OF FREE TESTOSTERONE: CPT

## 2021-05-28 PROCEDURE — 82306 VITAMIN D 25 HYDROXY: CPT

## 2021-05-28 PROCEDURE — 84403 ASSAY OF TOTAL TESTOSTERONE: CPT

## 2021-05-28 PROCEDURE — 80050 GENERAL HEALTH PANEL: CPT

## 2021-05-28 RX ORDER — LAMOTRIGINE 25 MG/1
TABLET ORAL
Qty: 45 TABLET | Refills: 0 | Status: SHIPPED | OUTPATIENT
Start: 2021-05-28 | End: 2022-05-23

## 2021-05-28 RX ORDER — SILDENAFIL 50 MG/1
TABLET, FILM COATED ORAL
Qty: 30 TABLET | Refills: 2 | Status: SHIPPED | OUTPATIENT
Start: 2021-05-28 | End: 2021-10-13

## 2021-05-28 RX ORDER — LAMOTRIGINE 25 MG/1
TABLET ORAL
Qty: 45 TABLET | Refills: 0 | Status: SHIPPED | OUTPATIENT
Start: 2021-05-28 | End: 2021-05-28

## 2021-05-28 RX ORDER — SILDENAFIL 50 MG/1
TABLET, FILM COATED ORAL
Qty: 30 TABLET | Refills: 2 | Status: SHIPPED | OUTPATIENT
Start: 2021-05-28 | End: 2021-05-28

## 2021-05-28 NOTE — PROGRESS NOTES
"Chief Complaint  Wellness Check (needing labs) and Seizures (Hx of seizures)    Subjective    History of Present Illness      Patient presents to [facility] for   Pt states that he is here for an evaluation with seizures. Pt state that he has one yesterday that lasted 3-5 minutes. Pt states that he has had 3 seizures this year.    Seizures   This is a recurrent problem. There were 4 to 5 seizures. The most recent episode lasted 2 to 5 minutes. Associated symptoms include headaches. There has been no fever.        Review of Systems   Constitutional: Negative.    Eyes: Negative.    Respiratory: Negative.    Cardiovascular: Negative.    Gastrointestinal: Negative.    Endocrine: Negative.    Genitourinary: Negative.    Musculoskeletal: Negative.    Skin: Negative.    Allergic/Immunologic: Negative.    Neurological: Positive for seizures.   Hematological: Negative.    Psychiatric/Behavioral: Negative.        I have reviewed and agree with the HPI and ROS information as above.  Luisana aNvarro, APRN     Objective   Vital Signs:   /76   Pulse 88   Temp 97.6 °F (36.4 °C)   Resp 20   Ht 179.3 cm (70.6\")   Wt (!) 144 kg (317 lb)   BMI 44.71 kg/m²       Physical Exam  Vitals and nursing note reviewed.   Constitutional:       Appearance: Normal appearance. He is well-developed. He is obese.   HENT:      Head: Normocephalic and atraumatic.      Right Ear: Tympanic membrane, ear canal and external ear normal.      Left Ear: Tympanic membrane, ear canal and external ear normal.      Nose: Nose normal. No septal deviation, nasal tenderness or congestion.      Mouth/Throat:      Lips: Pink. No lesions.      Mouth: Mucous membranes are moist. No oral lesions.      Dentition: Normal dentition.      Pharynx: Oropharynx is clear. No pharyngeal swelling, oropharyngeal exudate or posterior oropharyngeal erythema.   Eyes:      General: Lids are normal. Vision grossly intact. No scleral " icterus.        Right eye: No discharge.         Left eye: No discharge.      Extraocular Movements: Extraocular movements intact.      Conjunctiva/sclera: Conjunctivae normal.      Right eye: Right conjunctiva is not injected.      Left eye: Left conjunctiva is not injected.      Pupils: Pupils are equal, round, and reactive to light.   Neck:      Thyroid: No thyroid mass.      Trachea: Trachea normal.   Cardiovascular:      Rate and Rhythm: Normal rate and regular rhythm.      Heart sounds: Normal heart sounds. No murmur heard.   No gallop.    Pulmonary:      Effort: Pulmonary effort is normal.      Breath sounds: Normal breath sounds and air entry. No wheezing, rhonchi or rales.   Musculoskeletal:         General: No tenderness or deformity. Normal range of motion.      Cervical back: Full passive range of motion without pain, normal range of motion and neck supple.      Thoracic back: Normal.      Right lower leg: No edema.      Left lower leg: No edema.   Skin:     General: Skin is warm and dry.      Coloration: Skin is not jaundiced.      Findings: No rash.   Neurological:      Mental Status: He is alert and oriented to person, place, and time.      Cranial Nerves: Cranial nerves are intact.      Sensory: Sensation is intact.      Motor: Motor function is intact.      Coordination: Coordination is intact.      Gait: Gait is intact.      Deep Tendon Reflexes: Reflexes are normal and symmetric.   Psychiatric:         Mood and Affect: Mood and affect normal.         Behavior: Behavior normal.         Judgment: Judgment normal.                    Assessment and Plan      Problem List Items Addressed This Visit        Neuro    Seizures (CMS/HCC)    Current Assessment & Plan     Patient has a hx of seizures. He has not taken medication in several months because of the side effects of the medications. Patient also follows with Dr Wyatt, but cannot get into his office soon. Wishes to try lamictal at this time. F/u  1 month. Medication counseling completed.          Relevant Medications    lamoTRIgine (LaMICtal) 25 MG tablet       Sleep    Sleep apnea    Current Assessment & Plan     Wears a cpap. Follows with Dr Wyatt           Other Visit Diagnoses     Wellness examination    -  Primary    Needs labs completed    Relevant Orders    CBC Auto Differential    Comprehensive Metabolic Panel    Lipid Panel    TSH    Urinalysis With Culture If Indicated - Urine, Clean Catch    Vitamin D 25 Hydroxy    Testosterone, Free, Total    FSH & LH    Prostate cancer screening        family hx    Relevant Orders    PSA Screen    Encounter for hepatitis C screening test for low risk patient        Relevant Orders    Hepatitis C antibody    Other male erectile dysfunction        requesting viagra    Relevant Medications    sildenafil (Viagra) 50 MG tablet          Follow Up   Return in about 1 month (around 6/28/2021) for Mood follow up.  Patient was given instructions and counseling regarding his condition or for health maintenance advice. Please see specific information pulled into the AVS if appropriate.

## 2021-05-28 NOTE — ASSESSMENT & PLAN NOTE
Patient has a hx of seizures. He has not taken medication in several months because of the side effects of the medications. Patient also follows with Dr Wyatt, but cannot get into his office soon. Wishes to try lamictal at this time. F/u 1 month. Medication counseling completed.

## 2021-05-29 LAB
25(OH)D3 SERPL-MCNC: 16 NG/ML (ref 30–100)
FSH SERPL-ACNC: 2.12 MIU/ML
HCV AB SER DONR QL: NORMAL
LH SERPL-ACNC: 7.87 MIU/ML
PSA SERPL-MCNC: 0.33 NG/ML (ref 0–4)
TSH SERPL DL<=0.05 MIU/L-ACNC: 0.75 UIU/ML (ref 0.27–4.2)

## 2021-06-01 LAB
TESTOST FREE SERPL-MCNC: 4.6 PG/ML (ref 8.7–25.1)
TESTOST SERPL-MCNC: 122 NG/DL (ref 264–916)

## 2021-06-03 NOTE — PROGRESS NOTES
Vitamin D is very low- 50,000 units weekly x 6 weeks then OTC 5,000 units daily  Testosterone is very low- do the repeat testosterone labs and if still low he will need replacement  Trigs are elevated- take OTC fish oil  Other labs good

## 2021-06-07 ENCOUNTER — TELEPHONE (OUTPATIENT)
Dept: FAMILY MEDICINE CLINIC | Facility: CLINIC | Age: 31
End: 2021-06-07

## 2021-06-07 NOTE — TELEPHONE ENCOUNTER
----- Message from ARMANDO Bello sent at 6/3/2021  8:04 AM CDT -----  Vitamin D is very low- 50,000 units weekly x 6 weeks then OTC 5,000 units daily  Testosterone is very low- do the repeat testosterone labs and if still low he will need replacement  Trigs are elevated- take OTC fish oil  Other labs good

## 2021-08-16 ENCOUNTER — OFFICE VISIT (OUTPATIENT)
Dept: URGENT CARE | Age: 31
End: 2021-08-16

## 2021-08-16 ENCOUNTER — HOSPITAL ENCOUNTER (EMERGENCY)
Facility: HOSPITAL | Age: 31
Discharge: HOME | End: 2021-08-16
Attending: EMERGENCY MEDICINE | Admitting: EMERGENCY MEDICINE

## 2021-08-16 ENCOUNTER — APPOINTMENT (OUTPATIENT)
Dept: GENERAL RADIOLOGY | Facility: HOSPITAL | Age: 31
End: 2021-08-16

## 2021-08-16 VITALS
SYSTOLIC BLOOD PRESSURE: 139 MMHG | BODY MASS INDEX: 41.99 KG/M2 | WEIGHT: 310 LBS | RESPIRATION RATE: 20 BRPM | TEMPERATURE: 99 F | OXYGEN SATURATION: 97 % | HEIGHT: 72 IN | DIASTOLIC BLOOD PRESSURE: 82 MMHG | HEART RATE: 92 BPM

## 2021-08-16 DIAGNOSIS — J18.9 PNEUMONIA DUE TO INFECTIOUS ORGANISM, UNSPECIFIED LATERALITY, UNSPECIFIED PART OF LUNG: Primary | ICD-10-CM

## 2021-08-16 DIAGNOSIS — Z53.21 PATIENT LEFT WITHOUT BEING SEEN: Primary | ICD-10-CM

## 2021-08-16 DIAGNOSIS — R56.9 SEIZURE (HCC): ICD-10-CM

## 2021-08-16 LAB
ALBUMIN SERPL-MCNC: 4.7 G/DL (ref 3.5–5.2)
ALBUMIN/GLOB SERPL: 2 G/DL
ALP SERPL-CCNC: 64 U/L (ref 39–117)
ALT SERPL W P-5'-P-CCNC: 56 U/L (ref 1–41)
ANION GAP SERPL CALCULATED.3IONS-SCNC: 12 MMOL/L (ref 5–15)
APTT PPP: 30.9 SECONDS (ref 24.1–35)
AST SERPL-CCNC: 47 U/L (ref 1–40)
BASOPHILS # BLD AUTO: 0.07 10*3/MM3 (ref 0–0.2)
BASOPHILS NFR BLD AUTO: 0.6 % (ref 0–1.5)
BILIRUB SERPL-MCNC: 0.4 MG/DL (ref 0–1.2)
BUN SERPL-MCNC: 15 MG/DL (ref 6–20)
BUN/CREAT SERPL: 16.5 (ref 7–25)
CALCIUM SPEC-SCNC: 9.3 MG/DL (ref 8.6–10.5)
CHLORIDE SERPL-SCNC: 103 MMOL/L (ref 98–107)
CK SERPL-CCNC: 214 U/L (ref 20–200)
CO2 SERPL-SCNC: 23 MMOL/L (ref 22–29)
CREAT SERPL-MCNC: 0.91 MG/DL (ref 0.76–1.27)
DEPRECATED RDW RBC AUTO: 38.5 FL (ref 37–54)
EOSINOPHIL # BLD AUTO: 0.31 10*3/MM3 (ref 0–0.4)
EOSINOPHIL NFR BLD AUTO: 2.7 % (ref 0.3–6.2)
ERYTHROCYTE [DISTWIDTH] IN BLOOD BY AUTOMATED COUNT: 12.5 % (ref 12.3–15.4)
FLUAV RNA RESP QL NAA+PROBE: NOT DETECTED
FLUBV RNA RESP QL NAA+PROBE: NOT DETECTED
GFR SERPL CREATININE-BSD FRML MDRD: 98 ML/MIN/1.73
GLOBULIN UR ELPH-MCNC: 2.3 GM/DL
GLUCOSE SERPL-MCNC: 98 MG/DL (ref 65–99)
HCT VFR BLD AUTO: 43.3 % (ref 37.5–51)
HGB BLD-MCNC: 15.3 G/DL (ref 13–17.7)
HOLD SPECIMEN: NORMAL
IMM GRANULOCYTES # BLD AUTO: 0.06 10*3/MM3 (ref 0–0.05)
IMM GRANULOCYTES NFR BLD AUTO: 0.5 % (ref 0–0.5)
INR PPP: 1.01 (ref 0.91–1.09)
LYMPHOCYTES # BLD AUTO: 2.97 10*3/MM3 (ref 0.7–3.1)
LYMPHOCYTES NFR BLD AUTO: 26.3 % (ref 19.6–45.3)
MCH RBC QN AUTO: 30.2 PG (ref 26.6–33)
MCHC RBC AUTO-ENTMCNC: 35.3 G/DL (ref 31.5–35.7)
MCV RBC AUTO: 85.6 FL (ref 79–97)
MONOCYTES # BLD AUTO: 0.68 10*3/MM3 (ref 0.1–0.9)
MONOCYTES NFR BLD AUTO: 6 % (ref 5–12)
NEUTROPHILS NFR BLD AUTO: 63.9 % (ref 42.7–76)
NEUTROPHILS NFR BLD AUTO: 7.19 10*3/MM3 (ref 1.7–7)
NRBC BLD AUTO-RTO: 0 /100 WBC (ref 0–0.2)
PLATELET # BLD AUTO: 300 10*3/MM3 (ref 140–450)
PMV BLD AUTO: 10.2 FL (ref 6–12)
POTASSIUM SERPL-SCNC: 3.6 MMOL/L (ref 3.5–5.2)
PROCALCITONIN SERPL-MCNC: 0.1 NG/ML (ref 0–0.25)
PROT SERPL-MCNC: 7 G/DL (ref 6–8.5)
PROTHROMBIN TIME: 12.5 SECONDS (ref 11.5–13.4)
RBC # BLD AUTO: 5.06 10*6/MM3 (ref 4.14–5.8)
RSV RNA NPH QL NAA+NON-PROBE: NOT DETECTED
SARS-COV-2 RNA RESP QL NAA+PROBE: NOT DETECTED
SODIUM SERPL-SCNC: 138 MMOL/L (ref 136–145)
WBC # BLD AUTO: 11.28 10*3/MM3 (ref 3.4–10.8)
WHOLE BLOOD HOLD SPECIMEN: NORMAL

## 2021-08-16 PROCEDURE — 85730 THROMBOPLASTIN TIME PARTIAL: CPT | Performed by: EMERGENCY MEDICINE

## 2021-08-16 PROCEDURE — 96374 THER/PROPH/DIAG INJ IV PUSH: CPT

## 2021-08-16 PROCEDURE — 71045 X-RAY EXAM CHEST 1 VIEW: CPT

## 2021-08-16 PROCEDURE — 25010000002 DEXAMETHASONE PER 1 MG: Performed by: EMERGENCY MEDICINE

## 2021-08-16 PROCEDURE — 96375 TX/PRO/DX INJ NEW DRUG ADDON: CPT

## 2021-08-16 PROCEDURE — 85610 PROTHROMBIN TIME: CPT | Performed by: EMERGENCY MEDICINE

## 2021-08-16 PROCEDURE — 80053 COMPREHEN METABOLIC PANEL: CPT | Performed by: EMERGENCY MEDICINE

## 2021-08-16 PROCEDURE — 25010000002 METOCLOPRAMIDE PER 10 MG: Performed by: EMERGENCY MEDICINE

## 2021-08-16 PROCEDURE — 87637 SARSCOV2&INF A&B&RSV AMP PRB: CPT | Performed by: EMERGENCY MEDICINE

## 2021-08-16 PROCEDURE — 87040 BLOOD CULTURE FOR BACTERIA: CPT | Performed by: EMERGENCY MEDICINE

## 2021-08-16 PROCEDURE — 99284 EMERGENCY DEPT VISIT MOD MDM: CPT

## 2021-08-16 PROCEDURE — 84145 PROCALCITONIN (PCT): CPT | Performed by: EMERGENCY MEDICINE

## 2021-08-16 PROCEDURE — 25010000002 DIPHENHYDRAMINE PER 50 MG: Performed by: EMERGENCY MEDICINE

## 2021-08-16 PROCEDURE — 82550 ASSAY OF CK (CPK): CPT | Performed by: EMERGENCY MEDICINE

## 2021-08-16 PROCEDURE — 85025 COMPLETE CBC W/AUTO DIFF WBC: CPT | Performed by: EMERGENCY MEDICINE

## 2021-08-16 RX ORDER — DEXAMETHASONE SODIUM PHOSPHATE 10 MG/ML
10 INJECTION INTRAMUSCULAR; INTRAVENOUS ONCE
Status: COMPLETED | OUTPATIENT
Start: 2021-08-16 | End: 2021-08-16

## 2021-08-16 RX ORDER — AZITHROMYCIN 250 MG/1
TABLET, FILM COATED ORAL
Qty: 6 TABLET | Refills: 0 | Status: SHIPPED | OUTPATIENT
Start: 2021-08-16 | End: 2021-08-25

## 2021-08-16 RX ORDER — LEVOFLOXACIN 500 MG/1
500 TABLET, FILM COATED ORAL ONCE
Status: COMPLETED | OUTPATIENT
Start: 2021-08-16 | End: 2021-08-16

## 2021-08-16 RX ORDER — LAMOTRIGINE 100 MG/1
100 TABLET ORAL ONCE
Status: COMPLETED | OUTPATIENT
Start: 2021-08-16 | End: 2021-08-16

## 2021-08-16 RX ORDER — METOCLOPRAMIDE HYDROCHLORIDE 5 MG/ML
10 INJECTION INTRAMUSCULAR; INTRAVENOUS ONCE
Status: COMPLETED | OUTPATIENT
Start: 2021-08-16 | End: 2021-08-16

## 2021-08-16 RX ORDER — DIPHENHYDRAMINE HYDROCHLORIDE 50 MG/ML
25 INJECTION INTRAMUSCULAR; INTRAVENOUS ONCE
Status: COMPLETED | OUTPATIENT
Start: 2021-08-16 | End: 2021-08-16

## 2021-08-16 RX ADMIN — SODIUM CHLORIDE 1000 ML: 9 INJECTION, SOLUTION INTRAVENOUS at 19:45

## 2021-08-16 RX ADMIN — DEXAMETHASONE SODIUM PHOSPHATE 10 MG: 10 INJECTION INTRAMUSCULAR; INTRAVENOUS at 20:10

## 2021-08-16 RX ADMIN — LEVOFLOXACIN 500 MG: 500 TABLET, FILM COATED ORAL at 21:56

## 2021-08-16 RX ADMIN — METOCLOPRAMIDE HYDROCHLORIDE 10 MG: 5 INJECTION INTRAMUSCULAR; INTRAVENOUS at 20:11

## 2021-08-16 RX ADMIN — LAMOTRIGINE 100 MG: 100 TABLET ORAL at 19:50

## 2021-08-16 RX ADMIN — DIPHENHYDRAMINE HYDROCHLORIDE 25 MG: 50 INJECTION, SOLUTION INTRAMUSCULAR; INTRAVENOUS at 20:11

## 2021-08-21 LAB — BACTERIA SPEC AEROBE CULT: NORMAL

## 2021-08-25 ENCOUNTER — OFFICE VISIT (OUTPATIENT)
Dept: FAMILY MEDICINE CLINIC | Facility: CLINIC | Age: 31
End: 2021-08-25

## 2021-08-25 VITALS
SYSTOLIC BLOOD PRESSURE: 133 MMHG | WEIGHT: 315 LBS | OXYGEN SATURATION: 96 % | RESPIRATION RATE: 20 BRPM | HEART RATE: 109 BPM | HEIGHT: 72 IN | TEMPERATURE: 96.6 F | BODY MASS INDEX: 42.66 KG/M2 | DIASTOLIC BLOOD PRESSURE: 77 MMHG

## 2021-08-25 DIAGNOSIS — R79.89 LOW TESTOSTERONE: ICD-10-CM

## 2021-08-25 DIAGNOSIS — J18.9 PNEUMONIA DUE TO INFECTIOUS ORGANISM, UNSPECIFIED LATERALITY, UNSPECIFIED PART OF LUNG: Primary | ICD-10-CM

## 2021-08-25 PROCEDURE — 99214 OFFICE O/P EST MOD 30 MIN: CPT | Performed by: NURSE PRACTITIONER

## 2021-08-25 RX ORDER — LEVOFLOXACIN 500 MG/1
500 TABLET, FILM COATED ORAL DAILY
Qty: 5 TABLET | Refills: 0 | Status: SHIPPED | OUTPATIENT
Start: 2021-08-25 | End: 2021-10-13

## 2021-08-25 NOTE — PROGRESS NOTES
"Chief Complaint  Pneumonia (F/U)    Subjective    History of Present Illness {CC  Problem List  Visit Diagnosis   Encounters  Notes  Medications  Labs  Result Review Imaging  Media :23}     Patient presents to [facility] for   Patient states he is here for a f/u on his pneumonia, he still has coughing spells.        Review of Systems    I have reviewed and agree with the HPI and ROS information as above.  Smitha Montelongo Mikal, APRN     Objective   Vital Signs:   /77   Pulse 109   Temp 96.6 °F (35.9 °C)   Resp 20   Ht 182.9 cm (72\")   Wt (!) 146 kg (322 lb)   SpO2 96%   BMI 43.67 kg/m²       Physical Exam  Constitutional:       Appearance: Normal appearance. He is well-developed. He is obese.   HENT:      Head: Normocephalic and atraumatic.      Right Ear: External ear normal.      Left Ear: External ear normal.      Nose: Nose normal. No nasal tenderness or congestion.      Mouth/Throat:      Lips: Pink. No lesions.      Mouth: Mucous membranes are moist. No oral lesions.      Dentition: Normal dentition.      Pharynx: Oropharynx is clear. No pharyngeal swelling, oropharyngeal exudate or posterior oropharyngeal erythema.   Eyes:      General: Lids are normal. Vision grossly intact. No scleral icterus.        Right eye: No discharge.         Left eye: No discharge.      Extraocular Movements: Extraocular movements intact.      Conjunctiva/sclera: Conjunctivae normal.      Right eye: Right conjunctiva is not injected.      Left eye: Left conjunctiva is not injected.      Pupils: Pupils are equal, round, and reactive to light.   Cardiovascular:      Rate and Rhythm: Normal rate and regular rhythm.      Heart sounds: Normal heart sounds. No murmur heard.   No gallop.    Pulmonary:      Effort: Pulmonary effort is normal.      Breath sounds: Normal breath sounds and air entry. No wheezing, rhonchi or rales.   Musculoskeletal:         General: No tenderness or " deformity. Normal range of motion.      Cervical back: Full passive range of motion without pain, normal range of motion and neck supple.      Right lower leg: No edema.      Left lower leg: No edema.   Skin:     General: Skin is warm and dry.      Coloration: Skin is not jaundiced.      Findings: No rash.   Neurological:      Mental Status: He is alert and oriented to person, place, and time.      Cranial Nerves: Cranial nerves are intact.      Sensory: Sensation is intact.      Motor: Motor function is intact.      Coordination: Coordination is intact.      Gait: Gait is intact.   Psychiatric:         Attention and Perception: Attention normal.         Mood and Affect: Mood and affect normal.         Behavior: Behavior is not hyperactive. Behavior is cooperative.         Thought Content: Thought content normal.         Judgment: Judgment normal.          Result Review  Data Reviewed:                   Assessment and Plan      Problem List Items Addressed This Visit     None      Visit Diagnoses     Pneumonia due to infectious organism, unspecified laterality, unspecified part of lung    -  Primary    Relevant Medications    levoFLOXacin (Levaquin) 500 MG tablet    Low testosterone        Relevant Orders    FSH & LH    Testosterone        Patient was seen in the ER a little over a week ago and diagnosed with pneumonia.  His Covid was negative.  He was started on Levaquin 500 mg but was only given 5 days worth.  He did complete that.  He does feel like at times he still has a very harsh cough.  But does admit that he does overall feel better than he did.  We will go ahead and give a few more days of the Levaquin.  If symptoms persist or not completely resolve the patient to return for either another x-ray or a CT of his chest.  His oxygen level was good in office today.    Patient is also requesting to return for his other set of testosterone labs.  We will put those orders in.  If it is low again then he will  schedule to come back in and discuss further treatment with Dr. Morales.      Follow Up   Return if symptoms worsen or fail to improve.  Patient was given instructions and counseling regarding his condition or for health maintenance advice. Please see specific information pulled into the AVS if appropriate.       Answers for HPI/ROS submitted by the patient on 8/25/2021  What is the primary reason for your visit?: Other  Please describe your symptoms.: Pneumonia  Have you had these symptoms before?: Yes  How long have you been having these symptoms?: 1-2 weeks  Please list any medications you are currently taking for this condition.: None  Please describe any probable cause for these symptoms. : Not sure

## 2021-09-15 ENCOUNTER — APPOINTMENT (OUTPATIENT)
Dept: GENERAL RADIOLOGY | Age: 31
End: 2021-09-15
Payer: MEDICAID

## 2021-09-15 ENCOUNTER — HOSPITAL ENCOUNTER (EMERGENCY)
Age: 31
Discharge: HOME | End: 2021-09-15
Attending: EMERGENCY MEDICINE
Payer: MEDICAID

## 2021-09-15 ENCOUNTER — APPOINTMENT (OUTPATIENT)
Dept: CT IMAGING | Age: 31
End: 2021-09-15
Payer: MEDICAID

## 2021-09-15 VITALS
HEART RATE: 85 BPM | HEIGHT: 68 IN | RESPIRATION RATE: 20 BRPM | OXYGEN SATURATION: 98 % | WEIGHT: 250 LBS | DIASTOLIC BLOOD PRESSURE: 76 MMHG | SYSTOLIC BLOOD PRESSURE: 112 MMHG | BODY MASS INDEX: 37.89 KG/M2 | TEMPERATURE: 98.8 F

## 2021-09-15 DIAGNOSIS — R55 SYNCOPE AND COLLAPSE: Primary | ICD-10-CM

## 2021-09-15 DIAGNOSIS — R56.9 SEIZURE-LIKE ACTIVITY (HCC): ICD-10-CM

## 2021-09-15 LAB
ADENOVIRUS BY PCR: NOT DETECTED
ALBUMIN SERPL-MCNC: 4.7 G/DL (ref 3.5–5.2)
ALP BLD-CCNC: 66 U/L (ref 40–130)
ALT SERPL-CCNC: 69 U/L (ref 5–41)
AMPHETAMINE SCREEN, URINE: NEGATIVE
ANION GAP SERPL CALCULATED.3IONS-SCNC: 10 MMOL/L (ref 7–19)
AST SERPL-CCNC: 41 U/L (ref 5–40)
BARBITURATE SCREEN URINE: NEGATIVE
BASOPHILS ABSOLUTE: 0.1 K/UL (ref 0–0.2)
BASOPHILS RELATIVE PERCENT: 0.7 % (ref 0–1)
BENZODIAZEPINE SCREEN, URINE: NEGATIVE
BILIRUB SERPL-MCNC: 0.6 MG/DL (ref 0.2–1.2)
BILIRUBIN URINE: NEGATIVE
BLOOD, URINE: NEGATIVE
BORDETELLA PARAPERTUSSIS BY PCR: NOT DETECTED
BORDETELLA PERTUSSIS BY PCR: NOT DETECTED
BUN BLDV-MCNC: 13 MG/DL (ref 6–20)
CALCIUM SERPL-MCNC: 9.5 MG/DL (ref 8.6–10)
CANNABINOID SCREEN URINE: NEGATIVE
CHLAMYDOPHILIA PNEUMONIAE BY PCR: NOT DETECTED
CHLORIDE BLD-SCNC: 105 MMOL/L (ref 98–111)
CLARITY: CLEAR
CO2: 25 MMOL/L (ref 22–29)
COCAINE METABOLITE SCREEN URINE: NEGATIVE
COLOR: YELLOW
CORONAVIRUS 229E BY PCR: NOT DETECTED
CORONAVIRUS HKU1 BY PCR: NOT DETECTED
CORONAVIRUS NL63 BY PCR: NOT DETECTED
CORONAVIRUS OC43 BY PCR: NOT DETECTED
CREAT SERPL-MCNC: 0.9 MG/DL (ref 0.5–1.2)
EOSINOPHILS ABSOLUTE: 0.2 K/UL (ref 0–0.6)
EOSINOPHILS RELATIVE PERCENT: 2.6 % (ref 0–5)
ETHANOL: <10 MG/DL (ref 0–0.08)
GFR AFRICAN AMERICAN: >59
GFR NON-AFRICAN AMERICAN: >60
GLUCOSE BLD-MCNC: 113 MG/DL (ref 74–109)
GLUCOSE URINE: NEGATIVE MG/DL
HCT VFR BLD CALC: 48.5 % (ref 42–52)
HEMOGLOBIN: 15.9 G/DL (ref 14–18)
HUMAN METAPNEUMOVIRUS BY PCR: NOT DETECTED
HUMAN RHINOVIRUS/ENTEROVIRUS BY PCR: NOT DETECTED
IMMATURE GRANULOCYTES #: 0 K/UL
INFLUENZA A BY PCR: NOT DETECTED
INFLUENZA B BY PCR: NOT DETECTED
INR BLD: 0.91 (ref 0.88–1.18)
KETONES, URINE: NEGATIVE MG/DL
LEUKOCYTE ESTERASE, URINE: NEGATIVE
LIPASE: 30 U/L (ref 13–60)
LYMPHOCYTES ABSOLUTE: 4.5 K/UL (ref 1.1–4.5)
LYMPHOCYTES RELATIVE PERCENT: 48.6 % (ref 20–40)
Lab: NORMAL
MCH RBC QN AUTO: 29.7 PG (ref 27–31)
MCHC RBC AUTO-ENTMCNC: 32.8 G/DL (ref 33–37)
MCV RBC AUTO: 90.5 FL (ref 80–94)
MONOCYTES ABSOLUTE: 0.6 K/UL (ref 0–0.9)
MONOCYTES RELATIVE PERCENT: 6.9 % (ref 0–10)
MYCOPLASMA PNEUMONIAE BY PCR: NOT DETECTED
NEUTROPHILS ABSOLUTE: 3.8 K/UL (ref 1.5–7.5)
NEUTROPHILS RELATIVE PERCENT: 40.8 % (ref 50–65)
NITRITE, URINE: NEGATIVE
OPIATE SCREEN URINE: NEGATIVE
PARAINFLUENZA VIRUS 1 BY PCR: NOT DETECTED
PARAINFLUENZA VIRUS 2 BY PCR: NOT DETECTED
PARAINFLUENZA VIRUS 3 BY PCR: NOT DETECTED
PARAINFLUENZA VIRUS 4 BY PCR: NOT DETECTED
PDW BLD-RTO: 13 % (ref 11.5–14.5)
PH UA: 5.5 (ref 5–8)
PLATELET # BLD: 300 K/UL (ref 130–400)
PMV BLD AUTO: 9.8 FL (ref 9.4–12.4)
POTASSIUM SERPL-SCNC: 4 MMOL/L (ref 3.5–5)
PRO-BNP: <5 PG/ML (ref 0–450)
PROTEIN UA: NEGATIVE MG/DL
PROTHROMBIN TIME: 12.5 SEC (ref 12–14.6)
RBC # BLD: 5.36 M/UL (ref 4.7–6.1)
RESPIRATORY SYNCYTIAL VIRUS BY PCR: NOT DETECTED
SARS-COV-2, PCR: NOT DETECTED
SODIUM BLD-SCNC: 140 MMOL/L (ref 136–145)
SPECIFIC GRAVITY UA: 1.02 (ref 1–1.03)
TOTAL PROTEIN: 7.3 G/DL (ref 6.6–8.7)
TROPONIN: <0.01 NG/ML (ref 0–0.03)
UROBILINOGEN, URINE: 1 E.U./DL
WBC # BLD: 9.2 K/UL (ref 4.8–10.8)

## 2021-09-15 PROCEDURE — 96361 HYDRATE IV INFUSION ADD-ON: CPT

## 2021-09-15 PROCEDURE — 93005 ELECTROCARDIOGRAM TRACING: CPT | Performed by: EMERGENCY MEDICINE

## 2021-09-15 PROCEDURE — 70450 CT HEAD/BRAIN W/O DYE: CPT

## 2021-09-15 PROCEDURE — 82077 ASSAY SPEC XCP UR&BREATH IA: CPT

## 2021-09-15 PROCEDURE — 83880 ASSAY OF NATRIURETIC PEPTIDE: CPT

## 2021-09-15 PROCEDURE — 80307 DRUG TEST PRSMV CHEM ANLYZR: CPT

## 2021-09-15 PROCEDURE — 2580000003 HC RX 258: Performed by: EMERGENCY MEDICINE

## 2021-09-15 PROCEDURE — 0202U NFCT DS 22 TRGT SARS-COV-2: CPT

## 2021-09-15 PROCEDURE — 85025 COMPLETE CBC W/AUTO DIFF WBC: CPT

## 2021-09-15 PROCEDURE — 84484 ASSAY OF TROPONIN QUANT: CPT

## 2021-09-15 PROCEDURE — 36415 COLL VENOUS BLD VENIPUNCTURE: CPT

## 2021-09-15 PROCEDURE — 85610 PROTHROMBIN TIME: CPT

## 2021-09-15 PROCEDURE — 71045 X-RAY EXAM CHEST 1 VIEW: CPT

## 2021-09-15 PROCEDURE — 80053 COMPREHEN METABOLIC PANEL: CPT

## 2021-09-15 PROCEDURE — 81003 URINALYSIS AUTO W/O SCOPE: CPT

## 2021-09-15 PROCEDURE — 83690 ASSAY OF LIPASE: CPT

## 2021-09-15 PROCEDURE — 96360 HYDRATION IV INFUSION INIT: CPT

## 2021-09-15 PROCEDURE — 99283 EMERGENCY DEPT VISIT LOW MDM: CPT

## 2021-09-15 RX ORDER — LAMOTRIGINE 100 MG/1
100 TABLET ORAL DAILY
COMMUNITY

## 2021-09-15 RX ORDER — SODIUM CHLORIDE, SODIUM LACTATE, POTASSIUM CHLORIDE, CALCIUM CHLORIDE 600; 310; 30; 20 MG/100ML; MG/100ML; MG/100ML; MG/100ML
1000 INJECTION, SOLUTION INTRAVENOUS ONCE
Status: COMPLETED | OUTPATIENT
Start: 2021-09-15 | End: 2021-09-15

## 2021-09-15 RX ADMIN — SODIUM CHLORIDE, SODIUM LACTATE, POTASSIUM CHLORIDE, AND CALCIUM CHLORIDE 1000 ML: 600; 310; 30; 20 INJECTION, SOLUTION INTRAVENOUS at 08:38

## 2021-09-15 ASSESSMENT — ENCOUNTER SYMPTOMS
ABDOMINAL PAIN: 0
BACK PAIN: 0
COUGH: 1

## 2021-09-15 NOTE — ED PROVIDER NOTES
[facility] EMERGENCY DEPT  eMERGENCY dEPARTMENT eNCOUnter      Pt Name: Ayden Green  MRN: [id number]  [place] 1990  Date of evaluation: 9/15/2021  Provider: Selena Alva MD    [address] [place] [address]       Chief Complaint   Patient presents with    Loss of Consciousness         HISTORY OF PRESENT ILLNESS   (Location/Symptom, Timing/Onset,Context/Setting, Quality, Duration, Modifying Factors, Severity)  Note limiting factors. Ayden Green is a 27 y.o. male who presents to the emergency department with loss of consciousness. 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 He passed out his neighbor found him on the ground. The patient tells me he has a history of seizures he states this was not a seizure today even though he does not remember what happened. He has no injuries. Patient states he has had pneumonia he has not had the Covid vaccine. He had been on 2 rounds antibiotics already he is not short of breath he has no chest pain he has no fever. Patient feels weak. He denies any severe headaches at this time he has no neck pain no photophobia. He is awake alert back at his normal baseline. He states he was recently started on Lamictal. Patient's chart shows he has a history of seizures and pseudoseizures. The patient denies any history of syncope prior to this. Aside from seizure activity. This happened this morning just prior to arrival.    Nursing or staff reports the patient had pseudoseizure-like activity in the waiting room he was told to stop making the seizure-like activity and he did. The history is provided by the patient and medical records. NursingNotes were reviewed. REVIEW OF SYSTEMS    (2-9 systems for level 4, 10 or more for level 5)     Review of Systems   Constitutional: Negative for fever. Respiratory: Positive for cough. Cardiovascular: Negative for chest pain. Gastrointestinal: Negative for abdominal pain.    Musculoskeletal: Negative for back pain, neck pain and neck stiffness. Skin: Negative for rash. Neurological: Positive for seizures and syncope. Negative for headaches. A complete review of systems was performed and is negative except as noted above in the HPI. PAST MEDICAL HISTORY     Past Medical History:   Diagnosis Date    Obesity     Psoriasis     Seizure ([address]     Sleep apnea          SURGICAL HISTORY       Past Surgical History:   Procedure Laterality Date    TONSILLECTOMY AND ADENOIDECTOMY      TYMPANOSTOMY TUBE PLACEMENT Bilateral 1998         CURRENT MEDICATIONS       Previous Medications    LAMOTRIGINE (LAMICTAL) 100 MG TABLET    Take 100 mg by mouth daily       ALLERGIES     Keppra [levetiracetam]    FAMILY HISTORY       Family History   Problem Relation Age of Onset    High Blood Pressure Father     High Cholesterol Father     Alcohol Abuse Father     Cancer Brother 29        testicular    Diabetes Maternal Uncle     Pancreatic Cancer Maternal Grandmother           SOCIAL HISTORY       Social History     Socioeconomic History    Marital status:      Spouse name: Not on file    Number of children: Not on file    Years of education: Not on file    Highest education level: Not on file   Occupational History    Not on file   Tobacco Use    Smoking status: Never Smoker    Smokeless tobacco: Never Used   Vaping Use    Vaping Use: Never used   Substance and Sexual Activity    Alcohol use: Yes     Comment: occ    Drug use: No    Sexual activity: Not Currently     Partners: Female   Other Topics Concern    Not on file   Social History Narrative    Not on file     Social Determinants of Health     Financial Resource Strain:     Difficulty of Paying Living Expenses:    Food Insecurity:     Worried About Running Out of Food in the Last Year:     Ran Out of Food in the Last Year:    Transportation Needs:     Lack of Transportation (Medical):      Lack of Transportation (Non-Medical):    Physical Activity:     Days of Exercise per Week:     Minutes of Exercise per Session:    Stress:     Feeling of Stress :    Social Connections:     Frequency of Communication with Friends and Family:     Frequency of Social Gatherings with Friends and Family:     Attends Scientologist Services:     Active Member of Clubs or Organizations:     Attends Club or Organization Meetings:     Marital Status:    Intimate Partner Violence:     Fear of Current or Ex-Partner:     Emotionally Abused:     Physically Abused:     Sexually Abused:        SCREENINGS             PHYSICAL EXAM    (up to 7 for level 4, 8 or more for level 5)     ED Triage Vitals [09/15/21 0758]   BP Temp Temp Source Pulse Resp SpO2 Height Weight   (!) 163/99 98.9 °F (37.2 °C) Temporal 107 20 98 % 5' 8\" (1.727 m) 250 lb (113.4 kg)       Physical Exam  Vitals and nursing note reviewed. Constitutional:       General: He is not in acute distress. Appearance: He is obese. He is not toxic-appearing. HENT:      Head: Normocephalic and atraumatic. Eyes:      Extraocular Movements: Extraocular movements intact. Pupils: Pupils are equal, round, and reactive to light. Cardiovascular:      Rate and Rhythm: Normal rate and regular rhythm. Comments: Patient's heart rate is 73  Pulmonary:      Effort: Pulmonary effort is normal. No respiratory distress. Abdominal:      General: Abdomen is flat. Palpations: Abdomen is soft. Tenderness: There is no abdominal tenderness. Musculoskeletal:         General: No tenderness. Normal range of motion. Cervical back: Normal range of motion and neck supple. Skin:     General: Skin is warm and dry. Neurological:      General: No focal deficit present. Mental Status: He is alert and oriented to person, place, and time. Sensory: No sensory deficit. Motor: No weakness.       Gait: Gait normal.   Psychiatric:         Attention and Perception: Attention and perception normal. Mood and Affect: Mood is anxious. Speech: Speech normal.         Behavior: Behavior normal. Behavior is cooperative. Thought Content: Thought content normal.         DIAGNOSTIC RESULTS     EKG: All EKG's are interpreted by the Emergency Department Physician who either signs or Co-signs this chart in the absence of a cardiologist.    EKG with artifact sinus rhythm rate 88 no obvious acute changes or ischemia. QTc 429 ms. RADIOLOGY:   Non-plain film images such as CT, Ultrasound and MRI are read by the radiologist. Megn Rocker images are visualized and preliminarily interpreted by the emergency physician with the below findings:      Interpretation per the Radiologist below, if available at the time of this note:    XR CHEST PORTABLE   Final Result   No acute cardiopulmonary process. No visualized infiltrate. Signed by Dr Gino Rios   Final Result   No acute intracranial abnormality. Chronic sinusitis. Signed by Dr Prabha Colmenares            ED BEDSIDE ULTRASOUND:   Performed by ED Physician - none    LABS:  Labs Reviewed   COMPREHENSIVE METABOLIC PANEL - Abnormal; Notable for the following components:       Result Value    Glucose 113 (*)     ALT 69 (*)     AST 41 (*)     All other components within normal limits   CBC WITH AUTO DIFFERENTIAL - Abnormal; Notable for the following components:    MCHC 32.8 (*)     Neutrophils % 40.8 (*)     Lymphocytes % 48.6 (*)     All other components within normal limits   RESPIRATORY PANEL, MOLECULAR, WITH COVID-19   LIPASE   URINE RT REFLEX TO CULTURE   URINE DRUG SCREEN   TROPONIN   BRAIN NATRIURETIC PEPTIDE   ETHANOL   PROTIME-INR    Narrative: This is a recollect. Previous specimen is QNS       All other labs were within normal range or not returned as of this dictation.     EMERGENCY DEPARTMENT COURSE and DIFFERENTIALDIAGNOSIS/MDM:   Vitals:    Vitals:    09/15/21 0815 09/15/21 3869 09/15/21 0900 09/15/21 2418 BP: 120/76 (!) 145/79 126/88 135/76   Pulse: 79 99 79 76   Resp:  20 20 20   Temp:       TempSrc:       SpO2:  98% 99% 99%   Weight:       Height:           MDM  Number of Diagnoses or Management Options  Diagnosis management comments: Reassuring lab work-up unclear exactly what happened. Patient may have had seizure versus pseudoseizure. Stress and fatigue. Labs reassuring. 10:53 AM  Pt looks and feels better  Awake and alert no distress      Patient states his son has been sick he may been stressed out he started a new job we will go ahead and tell him to maybe go down on the new medication and taper it up in discussion with neurology. Otherwise follow-up with neurology outpatient. Neuro exam normal GCS 15 feels and looks better. Amount and/or Complexity of Data Reviewed  Clinical lab tests: ordered and reviewed  Tests in the radiology section of CPT®: ordered and reviewed    Patient Progress  Patient progress: improved        CONSULTS:  None    PROCEDURES:  Unless otherwise notedbelow, none     Procedures    FINAL IMPRESSION     1. Syncope and collapse    2.  Seizure-like activity [facility])          DISPOSITION/PLAN   DISPOSITION        PATIENT REFERRED TO:  Melissa Lam MD  [address] [place] [address]  [phone]    Schedule an appointment as soon as possible for a visit in 1 week        DISCHARGE MEDICATIONS:  New Prescriptions    No medications on file          (Please note that portions of this note were completed with a voice recognition program.  Efforts were made to edit the dictations butoccasionally words are mis-transcribed.)    Nick Baig MD (electronically signed)  AttendingEmergency Physician         Nick Baig MD  09/15/21 3078

## 2021-09-16 LAB
EKG P AXIS: 17 DEGREES
EKG P-R INTERVAL: 158 MS
EKG Q-T INTERVAL: 352 MS
EKG QRS DURATION: 96 MS
EKG QTC CALCULATION (BAZETT): 395 MS
EKG T AXIS: 74 DEGREES

## 2021-10-13 PROCEDURE — U0004 COV-19 TEST NON-CDC HGH THRU: HCPCS | Performed by: NURSE PRACTITIONER

## 2021-11-04 ENCOUNTER — OFFICE VISIT (OUTPATIENT)
Dept: FAMILY MEDICINE CLINIC | Facility: CLINIC | Age: 31
End: 2021-11-04

## 2021-11-04 VITALS
SYSTOLIC BLOOD PRESSURE: 144 MMHG | RESPIRATION RATE: 20 BRPM | DIASTOLIC BLOOD PRESSURE: 95 MMHG | HEIGHT: 72 IN | TEMPERATURE: 98.6 F | HEART RATE: 94 BPM | BODY MASS INDEX: 42.66 KG/M2 | WEIGHT: 315 LBS

## 2021-11-04 DIAGNOSIS — L01.00 IMPETIGO: Primary | ICD-10-CM

## 2021-11-04 PROCEDURE — 99213 OFFICE O/P EST LOW 20 MIN: CPT | Performed by: NURSE PRACTITIONER

## 2021-11-04 RX ORDER — CEPHALEXIN 500 MG/1
500 CAPSULE ORAL 2 TIMES DAILY
Qty: 28 CAPSULE | Refills: 0 | Status: SHIPPED | OUTPATIENT
Start: 2021-11-04 | End: 2021-11-18

## 2021-11-04 RX ORDER — MUPIROCIN CALCIUM 20 MG/G
1 CREAM TOPICAL 3 TIMES DAILY
Qty: 30 G | Refills: 1 | Status: SHIPPED | OUTPATIENT
Start: 2021-11-04 | End: 2022-05-23

## 2021-11-04 NOTE — PROGRESS NOTES
"Chief Complaint  Rash (chin, head for 4 days. )    Subjective    History of Present Illness      Patient presents to [facility] for   States he has had rash on his chin for 4 days. Ir has started to spread to his head. His daughter currently has impetigo.     Rash  This is a new problem. The current episode started in the past 7 days. The problem has been gradually worsening since onset. The affected locations include the face and head. The rash is characterized by scaling and redness. He was exposed to an ill contact. Past treatments include nothing. The treatment provided no relief.        Review of Systems   Skin: Positive for rash.       I have reviewed and agree with the HPI and ROS information as above.  Luisana Navarro, APRN     Objective   Vital Signs:   /95   Pulse 94   Temp 98.6 °F (37 °C)   Resp 20   Ht 182.9 cm (72\")   Wt (!) 148 kg (326 lb)   BMI 44.21 kg/m²       Physical Exam  Vitals and nursing note reviewed.   Constitutional:       Appearance: Normal appearance. He is well-developed.   HENT:      Head: Normocephalic and atraumatic.      Right Ear: Tympanic membrane, ear canal and external ear normal.      Left Ear: Tympanic membrane, ear canal and external ear normal.      Nose: Nose normal. No septal deviation, nasal tenderness or congestion.      Mouth/Throat:      Lips: Pink. No lesions.      Mouth: Mucous membranes are moist. No oral lesions.      Dentition: Normal dentition.      Pharynx: Oropharynx is clear. No pharyngeal swelling, oropharyngeal exudate or posterior oropharyngeal erythema.   Eyes:      General: Lids are normal. Vision grossly intact. No scleral icterus.        Right eye: No discharge.         Left eye: No discharge.      Extraocular Movements: Extraocular movements intact.      Conjunctiva/sclera: Conjunctivae normal.      Right eye: Right conjunctiva is not injected.      Left eye: Left conjunctiva is not injected.      Pupils: " Pupils are equal, round, and reactive to light.   Neck:      Thyroid: No thyroid mass.      Trachea: Trachea normal.   Cardiovascular:      Rate and Rhythm: Normal rate and regular rhythm.      Heart sounds: Normal heart sounds. No murmur heard.  No gallop.    Pulmonary:      Effort: Pulmonary effort is normal.      Breath sounds: Normal breath sounds and air entry. No wheezing, rhonchi or rales.   Musculoskeletal:         General: No tenderness or deformity. Normal range of motion.      Cervical back: Full passive range of motion without pain, normal range of motion and neck supple.      Thoracic back: Normal.      Right lower leg: No edema.      Left lower leg: No edema.   Skin:     General: Skin is warm and dry.      Coloration: Skin is not jaundiced.      Findings: Rash present.      Comments: Impetigo rash to face   Neurological:      Mental Status: He is alert and oriented to person, place, and time.      Cranial Nerves: Cranial nerves are intact.      Sensory: Sensation is intact.      Motor: Motor function is intact.      Coordination: Coordination is intact.      Gait: Gait is intact.      Deep Tendon Reflexes: Reflexes are normal and symmetric.   Psychiatric:         Mood and Affect: Mood and affect normal.         Behavior: Behavior normal.         Judgment: Judgment normal.                 Assessment and Plan      Problem List Items Addressed This Visit     None      Visit Diagnoses     Impetigo    -  Primary    Relevant Medications    cephalexin (Keflex) 500 MG capsule    mupirocin (BACTROBAN) 2 % cream      Daughter has impetigo. Patient has rash to face. Treat with Keflex        Follow Up   Return if symptoms worsen or fail to improve.  Patient was given instructions and counseling regarding his condition or for health maintenance advice. Please see specific information pulled into the AVS if appropriate.

## 2021-11-05 ENCOUNTER — TELEPHONE (OUTPATIENT)
Dept: FAMILY MEDICINE CLINIC | Facility: CLINIC | Age: 31
End: 2021-11-05

## 2021-11-05 NOTE — TELEPHONE ENCOUNTER
Caller: Nam Lee    Relationship: Self    Best call back number: [phone]    What medications are you currently taking:   Current Outpatient Medications on File Prior to Visit   Medication Sig Dispense Refill   • cephalexin (Keflex) 500 MG capsule Take 1 capsule by mouth 2 (Two) Times a Day for 14 days. 28 capsule 0   • lamoTRIgine (LaMICtal) 100 MG tablet Take 100 mg by mouth.     • lamoTRIgine (LaMICtal) 25 MG tablet Take 1 tablet by mouth Daily for 15 days, THEN 2 tablets Daily for 15 days. 45 tablet 0   • mupirocin (BACTROBAN) 2 % cream Apply 1 application topically to the appropriate area as directed 3 (Three) Times a Day. 30 g 1     No current facility-administered medications on file prior to visit.          Which medication are you concerned about: MUPIROCIN 2% CREAM    Who prescribed you this medication: SAVANAH LOMBARDO    What are your concerns: PHARMACY STATES THIS MEDICATION NEEDS A PRIOR AUTH. IS THERE ANOTHER MEDICATION COMPARABLE TO THIS ONE IF INSURANCE DENIES? PLEASE ADVISE

## 2021-11-05 NOTE — TELEPHONE ENCOUNTER
Submitted pa via covermymeds for pt's bactroban ointment. Awaiting determination. Attempted to contact pt back, no answer, left vm.

## 2021-12-27 ENCOUNTER — OFFICE VISIT (OUTPATIENT)
Dept: FAMILY MEDICINE CLINIC | Facility: CLINIC | Age: 31
End: 2021-12-27

## 2021-12-27 ENCOUNTER — LAB (OUTPATIENT)
Dept: LAB | Facility: HOSPITAL | Age: 31
End: 2021-12-27

## 2021-12-27 VITALS
HEART RATE: 103 BPM | WEIGHT: 235 LBS | DIASTOLIC BLOOD PRESSURE: 91 MMHG | BODY MASS INDEX: 31.83 KG/M2 | OXYGEN SATURATION: 95 % | TEMPERATURE: 98.3 F | RESPIRATION RATE: 18 BRPM | SYSTOLIC BLOOD PRESSURE: 150 MMHG | HEIGHT: 72 IN

## 2021-12-27 DIAGNOSIS — Z20.822 SUSPECTED COVID-19 VIRUS INFECTION: ICD-10-CM

## 2021-12-27 DIAGNOSIS — R50.9 FEVER, UNSPECIFIED FEVER CAUSE: ICD-10-CM

## 2021-12-27 DIAGNOSIS — Z20.822 SUSPECTED COVID-19 VIRUS INFECTION: Primary | ICD-10-CM

## 2021-12-27 DIAGNOSIS — J01.90 ACUTE SINUSITIS, RECURRENCE NOT SPECIFIED, UNSPECIFIED LOCATION: ICD-10-CM

## 2021-12-27 LAB
FLUAV AG NPH QL: NEGATIVE
FLUBV AG NPH QL IA: NEGATIVE
SARS-COV-2 RNA PNL SPEC NAA+PROBE: NOT DETECTED

## 2021-12-27 PROCEDURE — 87635 SARS-COV-2 COVID-19 AMP PRB: CPT

## 2021-12-27 PROCEDURE — 87804 INFLUENZA ASSAY W/OPTIC: CPT

## 2021-12-27 PROCEDURE — 96372 THER/PROPH/DIAG INJ SC/IM: CPT | Performed by: NURSE PRACTITIONER

## 2021-12-27 PROCEDURE — 99213 OFFICE O/P EST LOW 20 MIN: CPT | Performed by: NURSE PRACTITIONER

## 2021-12-27 PROCEDURE — C9803 HOPD COVID-19 SPEC COLLECT: HCPCS

## 2021-12-27 RX ORDER — CEFTRIAXONE 1 G/1
1 INJECTION, POWDER, FOR SOLUTION INTRAMUSCULAR; INTRAVENOUS EVERY 24 HOURS
Status: COMPLETED | OUTPATIENT
Start: 2021-12-27 | End: 2021-12-27

## 2021-12-27 RX ORDER — AMOXICILLIN AND CLAVULANATE POTASSIUM 875; 125 MG/1; MG/1
1 TABLET, FILM COATED ORAL 2 TIMES DAILY
Qty: 20 TABLET | Refills: 0 | Status: SHIPPED | OUTPATIENT
Start: 2021-12-27 | End: 2022-05-23

## 2021-12-27 RX ORDER — DEXAMETHASONE SODIUM PHOSPHATE 4 MG/ML
8 INJECTION, SOLUTION INTRA-ARTICULAR; INTRALESIONAL; INTRAMUSCULAR; INTRAVENOUS; SOFT TISSUE ONCE
Status: COMPLETED | OUTPATIENT
Start: 2021-12-27 | End: 2021-12-27

## 2021-12-27 RX ADMIN — DEXAMETHASONE SODIUM PHOSPHATE 8 MG: 4 INJECTION, SOLUTION INTRA-ARTICULAR; INTRALESIONAL; INTRAMUSCULAR; INTRAVENOUS; SOFT TISSUE at 15:42

## 2021-12-27 RX ADMIN — CEFTRIAXONE 1 G: 1 INJECTION, POWDER, FOR SOLUTION INTRAMUSCULAR; INTRAVENOUS at 15:40

## 2021-12-27 NOTE — PROGRESS NOTES
"Chief Complaint  Earache (Bilateral) and Nasal Congestion    Subjective    History of Present Illness      Patient presents to [facility] for   Patient presents today with bilateral ear pain. He also complains of congestion and shortness of breath. Symptoms presented three or four days ago. He states he has had a busted ear drum in the past. He also complains of swollen and sore lymph nodes.    Earache   Associated symptoms include coughing.        Review of Systems   HENT: Positive for congestion and ear pain.    Respiratory: Positive for cough and shortness of breath.    All other systems reviewed and are negative.      I have reviewed and agree with the HPI and ROS information as above.  Luisana Navarro, APRN     Objective   Vital Signs:   /91 (BP Location: Left arm, Patient Position: Sitting)   Pulse 103   Temp 98.3 °F (36.8 °C)   Resp 18   Ht 182.9 cm (72\")   Wt 107 kg (235 lb)   SpO2 95%   BMI 31.87 kg/m²       Physical Exam  Vitals and nursing note reviewed.   Constitutional:       Appearance: Normal appearance.   HENT:      Head: Normocephalic and atraumatic.      Right Ear: Tympanic membrane is erythematous and bulging.      Left Ear: Tympanic membrane is erythematous and bulging.      Nose: Congestion present.      Mouth/Throat:      Pharynx: Posterior oropharyngeal erythema present.   Cardiovascular:      Rate and Rhythm: Normal rate and regular rhythm.      Pulses: Normal pulses.      Heart sounds: Normal heart sounds.   Pulmonary:      Effort: Pulmonary effort is normal.   Musculoskeletal:         General: Normal range of motion.      Cervical back: Normal range of motion and neck supple.   Lymphadenopathy:      Cervical: Cervical adenopathy present.   Skin:     General: Skin is warm and dry.   Neurological:      General: No focal deficit present.      Mental Status: He is alert and oriented to person, place, and time.   Psychiatric:         Mood and " Affect: Mood normal.         Behavior: Behavior normal.                   Assessment and Plan      Problem List Items Addressed This Visit     None      Visit Diagnoses     Suspected COVID-19 virus infection    -  Primary    Relevant Orders    COVID PRE-OP / PRE-PROCEDURE SCREENING ORDER (NO ISOLATION) - Swab, Nasal Cavity    Fever, unspecified fever cause        Relevant Orders    Influenza Antigen, Rapid - Swab, Nasopharynx    Acute sinusitis, recurrence not specified, unspecified location        Relevant Medications    dexamethasone (DECADRON) injection 8 mg (Start on 12/27/2021  4:00 PM)    cefTRIAXone (ROCEPHIN) injection 1 g (Start on 12/27/2021  4:00 PM)    amoxicillin-clavulanate (Augmentin) 875-125 MG per tablet        She is complaining of sinus congestion, ear pain, and swollen lymph nodes.  He states that this has been going on for 3 to 4 days.  He does have a history of pneumonia and a history of a ruptured eardrum.  Will Covid and flu swab today.  We will also treat patient with acute sinus.  Plan  1.  Covid and flu swab, nurse will call results  2.  Dex 8 mg and Rocephin 1gm injections  3.  Oral antibiotics      Follow Up   Return if symptoms worsen or fail to improve.  Patient was given instructions and counseling regarding his condition or for health maintenance advice. Please see specific information pulled into the AVS if appropriate.

## 2022-05-05 ENCOUNTER — HOSPITAL ENCOUNTER (EMERGENCY)
Age: 32
Discharge: HOME | End: 2022-05-05
Attending: EMERGENCY MEDICINE
Payer: MEDICAID

## 2022-05-05 VITALS
TEMPERATURE: 98.6 F | SYSTOLIC BLOOD PRESSURE: 151 MMHG | DIASTOLIC BLOOD PRESSURE: 103 MMHG | OXYGEN SATURATION: 96 % | WEIGHT: 310 LBS | HEART RATE: 80 BPM | BODY MASS INDEX: 47.14 KG/M2 | RESPIRATION RATE: 19 BRPM

## 2022-05-05 DIAGNOSIS — G47.30 OBSERVED SLEEP APNEA: ICD-10-CM

## 2022-05-05 DIAGNOSIS — G40.919 BREAKTHROUGH SEIZURE (HCC): Primary | ICD-10-CM

## 2022-05-05 LAB
ALBUMIN SERPL-MCNC: 4.7 G/DL (ref 3.5–5.2)
ALP BLD-CCNC: 72 U/L (ref 40–130)
ALT SERPL-CCNC: 72 U/L (ref 5–41)
ANION GAP SERPL CALCULATED.3IONS-SCNC: 13 MMOL/L (ref 7–19)
AST SERPL-CCNC: 36 U/L (ref 5–40)
BASOPHILS ABSOLUTE: 0.1 K/UL (ref 0–0.2)
BASOPHILS RELATIVE PERCENT: 0.9 % (ref 0–1)
BILIRUB SERPL-MCNC: 0.5 MG/DL (ref 0.2–1.2)
BUN BLDV-MCNC: 15 MG/DL (ref 6–20)
CALCIUM SERPL-MCNC: 9.5 MG/DL (ref 8.6–10)
CHLORIDE BLD-SCNC: 104 MMOL/L (ref 98–111)
CO2: 24 MMOL/L (ref 22–29)
CREAT SERPL-MCNC: 0.9 MG/DL (ref 0.5–1.2)
EOSINOPHILS ABSOLUTE: 0.3 K/UL (ref 0–0.6)
EOSINOPHILS RELATIVE PERCENT: 3.6 % (ref 0–5)
GFR AFRICAN AMERICAN: >59
GFR NON-AFRICAN AMERICAN: >60
GLUCOSE BLD-MCNC: 100 MG/DL (ref 74–109)
HCT VFR BLD CALC: 46.7 % (ref 42–52)
HEMOGLOBIN: 15.7 G/DL (ref 14–18)
IMMATURE GRANULOCYTES #: 0 K/UL
LYMPHOCYTES ABSOLUTE: 3.6 K/UL (ref 1.1–4.5)
LYMPHOCYTES RELATIVE PERCENT: 43.4 % (ref 20–40)
MCH RBC QN AUTO: 29.4 PG (ref 27–31)
MCHC RBC AUTO-ENTMCNC: 33.6 G/DL (ref 33–37)
MCV RBC AUTO: 87.5 FL (ref 80–94)
MONOCYTES ABSOLUTE: 0.6 K/UL (ref 0–0.9)
MONOCYTES RELATIVE PERCENT: 6.9 % (ref 0–10)
NEUTROPHILS ABSOLUTE: 3.7 K/UL (ref 1.5–7.5)
NEUTROPHILS RELATIVE PERCENT: 44.7 % (ref 50–65)
PDW BLD-RTO: 12.4 % (ref 11.5–14.5)
PLATELET # BLD: 290 K/UL (ref 130–400)
PMV BLD AUTO: 10.4 FL (ref 9.4–12.4)
POTASSIUM REFLEX MAGNESIUM: 4 MMOL/L (ref 3.5–5)
RBC # BLD: 5.34 M/UL (ref 4.7–6.1)
SODIUM BLD-SCNC: 141 MMOL/L (ref 136–145)
TOTAL PROTEIN: 6.8 G/DL (ref 6.6–8.7)
WBC # BLD: 8.2 K/UL (ref 4.8–10.8)

## 2022-05-05 PROCEDURE — 80053 COMPREHEN METABOLIC PANEL: CPT

## 2022-05-05 PROCEDURE — 99283 EMERGENCY DEPT VISIT LOW MDM: CPT

## 2022-05-05 PROCEDURE — 85025 COMPLETE CBC W/AUTO DIFF WBC: CPT

## 2022-05-05 PROCEDURE — 36415 COLL VENOUS BLD VENIPUNCTURE: CPT

## 2022-05-05 RX ORDER — TOPIRAMATE 100 MG/1
100 TABLET, FILM COATED ORAL 2 TIMES DAILY
COMMUNITY

## 2022-05-05 ASSESSMENT — ENCOUNTER SYMPTOMS
VOMITING: 0
RHINORRHEA: 0
ABDOMINAL PAIN: 0
COUGH: 0
VOICE CHANGE: 0
DIARRHEA: 0
EYE REDNESS: 0
EYE PAIN: 0
SHORTNESS OF BREATH: 0

## 2022-05-05 NOTE — LETTER
[facility] EMERGENCY DEPT  [id number]  Phone: [phone]               May 5, 2022    Patient: Cori Reed   YOB: 1990   Date of Visit: 5/5/2022       To Whom It May Concern:    Andrae Malcolm was seen and treated in our emergency department on 5/5/2022. He may return to work on 05/06/2022.       Sincerely,       Brandi Dent RN         Signature:__________________________________

## 2022-05-05 NOTE — ED PROVIDER NOTES
[facility] EMERGENCY DEPT  EMERGENCY DEPARTMENT ENCOUNTER      Pt Name: Surjit Rubio  MRN: [id number]  [place] 1990  Date of evaluation: 5/5/2022  Provider: Gallo Howard MD    CHIEF COMPLAINT       Chief Complaint   Patient presents with    Seizures     Had seizure at work, remembers having a seizure this morning. No seizure activity at this time. HISTORY OF PRESENT ILLNESS   (Location/Symptom, Timing/Onset,Context/Setting, Quality, Duration, Modifying Factors, Severity)  Note limiting factors. Surjit Rubio is a 32 y.o. male who presents to the emergency department for evaluation after having 1 or 2 seizures this morning. States that he slept at a friend's house last night and was not able to use his CPAP machine and slept on his back. Did not feel like he slept very well last night and was feeling generally unwell this morning. States he felt sick to his stomach and was unable to eat anything and did not take his medications this morning. Has a history of seizures in the past and currently is just taking Topamax for them. Was prescribed Lamictal in the past but states that he is not taking it. Currently denies any specific complaints. States he would not have come in this morning but his coworkers called EMS and sent him here after they witnessed a seizure. HPI    NursingNotes were reviewed. REVIEW OF SYSTEMS    (2-9 systems for level 4, 10 or more for level 5)     Review of Systems   Constitutional: Positive for appetite change and fatigue. Negative for fever. HENT: Negative for congestion, rhinorrhea and voice change. Eyes: Negative for pain and redness. Respiratory: Negative for cough and shortness of breath. Cardiovascular: Negative for chest pain. Gastrointestinal: Negative for abdominal pain, diarrhea and vomiting. Endocrine: Negative. Genitourinary: Negative. Musculoskeletal: Negative for arthralgias and gait problem. Skin: Negative for rash and wound. Neurological: Positive for seizures. Negative for weakness and headaches. Hematological: Negative. Psychiatric/Behavioral: Negative. All other systems reviewed and are negative. A complete review of systems was performed and is negative except as noted above in the HPI.        PAST MEDICAL HISTORY     Past Medical History:   Diagnosis Date    Obesity     Psoriasis     Seizure ([address]     Sleep apnea          SURGICAL HISTORY       Past Surgical History:   Procedure Laterality Date    TONSILLECTOMY AND ADENOIDECTOMY      TYMPANOSTOMY TUBE PLACEMENT Bilateral 1998         CURRENT MEDICATIONS       Discharge Medication List as of 5/5/2022  9:53 AM      CONTINUE these medications which have NOT CHANGED    Details   topiramate (TOPAMAX) 100 MG tablet Take 100 mg by mouth 2 times dailyHistorical Med      lamoTRIgine (LAMICTAL) 100 MG tablet Take 100 mg by mouth dailyHistorical Med             ALLERGIES     Keppra [levetiracetam]    FAMILY HISTORY       Family History   Problem Relation Age of Onset    High Blood Pressure Father     High Cholesterol Father     Alcohol Abuse Father     Cancer Brother 29        testicular    Diabetes Maternal Uncle     Pancreatic Cancer Maternal Grandmother           SOCIAL HISTORY       Social History     Socioeconomic History    Marital status:      Spouse name: None    Number of children: None    Years of education: None    Highest education level: None   Occupational History    None   Tobacco Use    Smoking status: Never Smoker    Smokeless tobacco: Never Used   Vaping Use    Vaping Use: Never used   Substance and Sexual Activity    Alcohol use: Yes     Comment: occ    Drug use: No    Sexual activity: Not Currently     Partners: Female   Other Topics Concern    None   Social History Narrative    None     Social Determinants of Health     Financial Resource Strain:     Difficulty of Paying Living Expenses: Not on file   Food Insecurity:     Worried About [facility] in the Last Year: Not on file    Shonda of Food in the Last Year: Not on file   Transportation Needs:     Lack of Transportation (Medical): Not on file    Lack of Transportation (Non-Medical): Not on file   Physical Activity:     Days of Exercise per Week: Not on file    Minutes of Exercise per Session: Not on file   Stress:     Feeling of Stress : Not on file   Social Connections:     Frequency of Communication with Friends and Family: Not on file    Frequency of Social Gatherings with Friends and Family: Not on file    Attends Alevism Services: Not on file    Active Member of [address] [place] [address] [employer] or Organizations: Not on file    Attends Club or Organization Meetings: Not on file    Marital Status: Not on file   Intimate Partner Violence:     Fear of Current or Ex-Partner: Not on file    Emotionally Abused: Not on file    Physically Abused: Not on file    Sexually Abused: Not on file   Housing Stability:     Unable to Pay for Housing in the Last Year: Not on file    Number of [place] in the Last Year: Not on file    Unstable Housing in the Last Year: Not on file       SCREENINGS    [place] Coma Scale  Eye Opening: Spontaneous  Best Verbal Response: Oriented  Best Motor Response: Obeys commands  [place] Coma Scale Score: 15        PHYSICAL EXAM    (up to 7 for level 4, 8 or more for level 5)     ED Triage Vitals [05/05/22 0830]   BP Temp Temp Source Pulse Resp SpO2 Height Weight   (!) 151/86 98.6 °F (37 °C) Oral 82 18 94 % -- (!) 310 lb (140.6 kg)       Physical Exam  Vitals and nursing note reviewed. Constitutional:       General: He is not in acute distress. Appearance: He is well-developed. He is obese. He is not toxic-appearing or diaphoretic. HENT:      Head: Normocephalic and atraumatic. Eyes:      General: No scleral icterus. Right eye: No discharge. Left eye: No discharge. Pupils: Pupils are equal, round, and reactive to light. Cardiovascular:      Rate and Rhythm: Normal rate and regular rhythm. Heart sounds: Normal heart sounds. Pulmonary:      Effort: Pulmonary effort is normal. No respiratory distress. Breath sounds: No stridor. No wheezing or rhonchi. Abdominal:      General: There is no distension. Palpations: Abdomen is soft. Tenderness: There is no abdominal tenderness. Musculoskeletal:         General: No deformity. Normal range of motion. Cervical back: Normal range of motion. Skin:     General: Skin is warm and dry. Neurological:      Mental Status: He is alert and oriented to person, place, and time. GCS: GCS eye subscore is 4. GCS verbal subscore is 5. GCS motor subscore is 6. Cranial Nerves: No cranial nerve deficit, dysarthria or facial asymmetry. Sensory: No sensory deficit. Motor: No weakness or abnormal muscle tone. Coordination: Romberg sign negative. Coordination normal.      Gait: Gait normal.   Psychiatric:         Mood and Affect: Mood normal.         Speech: Speech normal.         Behavior: Behavior normal.         Thought Content:  Thought content normal.         Judgment: Judgment normal.         DIAGNOSTIC RESULTS     EKG: All EKG's are interpreted by the Emergency Department Physician who either signs or Co-signs this chart in the absence of a cardiologist.        RADIOLOGY:   Non-plain film images such as CT, Ultrasound and MRI are read by the radiologist. [facility] images are visualized and preliminarily interpreted by the emergency physician with the below findings:        Interpretation per the Radiologist below, if available at the time of this note:    No orders to display         ED BEDSIDE ULTRASOUND:   Performed by ED Physician - none    LABS:  Labs Reviewed   CBC WITH AUTO DIFFERENTIAL - Abnormal; Notable for the following components:       Result Value    Neutrophils % 44.7 (*)     Lymphocytes % 43.4 (*)     All other components within normal limits   COMPREHENSIVE METABOLIC PANEL W/ REFLEX TO MG FOR LOW K - Abnormal; Notable for the following components:    ALT 72 (*)     All other components within normal limits       All other labs were within normal range or not returned as of this dictation. Medications - No data to display    Coreyparul Bishopau and DIFFERENTIALDIAGNOSIS/MDM:   Vitals:    Vitals:    05/05/22 0830 05/05/22 0900   BP: (!) 151/86 (!) 151/103   Pulse: 82 80   Resp: 18 19   Temp: 98.6 °F (37 °C)    TempSrc: Oral    SpO2: 94% 96%   Weight: (!) 310 lb (140.6 kg)        MDM       Patient back to normal mental status on evaluation here. Laboratory evaluation unremarkable. Evaluation and work-up here revealed no signs of emergent or life-threatening pathology that would necessitate admission for further work-up or management at this time. Patient is felt to be stable for discharge home with return precautions for worsening of the condition or development of new concerning symptoms. Patient was encouraged to follow-up with their primary care doctor in the appropriate timeframe. Necessary prescriptions and information have been provided for treatment at home. Patient voices understanding and agreement with the plan. CONSULTS:  None    PROCEDURES:  Unless otherwise notedbelow, none     Procedures      FINAL IMPRESSION     1. Breakthrough seizure ([address]    2.  Observed sleep apnea          DISPOSITION/PLAN   DISPOSITION Decision To Discharge 05/05/2022 09:45:16 AM      PATIENT REFERRED TO:  [facility] EMERGENCY DEPT  [address] [address] [phone]    If symptoms worsen    MD Gio Balderas [facility] [address]  [phone]      As needed      DISCHARGE MEDICATIONS:  Discharge Medication List as of 5/5/2022  9:53 AM             (Please note that portions of this note were completed with a voice recognition program.  Efforts were made to edit the dictations butoccasionally words are mis-transcribed.)    Sharon Oliver MD (electronically signed)  AttendingEmergency Physician          Sharon Woo MD  05/05/22 3553

## 2022-05-23 ENCOUNTER — OFFICE VISIT (OUTPATIENT)
Dept: FAMILY MEDICINE CLINIC | Facility: CLINIC | Age: 32
End: 2022-05-23

## 2022-05-23 ENCOUNTER — LAB (OUTPATIENT)
Dept: LAB | Facility: HOSPITAL | Age: 32
End: 2022-05-23

## 2022-05-23 ENCOUNTER — TELEPHONE (OUTPATIENT)
Dept: FAMILY MEDICINE CLINIC | Facility: CLINIC | Age: 32
End: 2022-05-23

## 2022-05-23 VITALS
HEART RATE: 72 BPM | BODY MASS INDEX: 42.66 KG/M2 | TEMPERATURE: 98.5 F | RESPIRATION RATE: 24 BRPM | SYSTOLIC BLOOD PRESSURE: 130 MMHG | WEIGHT: 315 LBS | HEIGHT: 72 IN | DIASTOLIC BLOOD PRESSURE: 90 MMHG

## 2022-05-23 DIAGNOSIS — R05.9 COUGH: ICD-10-CM

## 2022-05-23 DIAGNOSIS — Z20.822 CLOSE EXPOSURE TO COVID-19 VIRUS: Primary | ICD-10-CM

## 2022-05-23 DIAGNOSIS — Z20.822 CLOSE EXPOSURE TO COVID-19 VIRUS: ICD-10-CM

## 2022-05-23 DIAGNOSIS — J01.90 ACUTE SINUSITIS, RECURRENCE NOT SPECIFIED, UNSPECIFIED LOCATION: ICD-10-CM

## 2022-05-23 DIAGNOSIS — R05.9 COUGH: Primary | ICD-10-CM

## 2022-05-23 LAB
FLUAV AG NPH QL: NEGATIVE
FLUBV AG NPH QL IA: NEGATIVE
S PYO AG THROAT QL: NEGATIVE
SARS-COV-2 RNA PNL SPEC NAA+PROBE: NOT DETECTED

## 2022-05-23 PROCEDURE — 99213 OFFICE O/P EST LOW 20 MIN: CPT | Performed by: NURSE PRACTITIONER

## 2022-05-23 PROCEDURE — 87804 INFLUENZA ASSAY W/OPTIC: CPT

## 2022-05-23 PROCEDURE — C9803 HOPD COVID-19 SPEC COLLECT: HCPCS

## 2022-05-23 PROCEDURE — 87880 STREP A ASSAY W/OPTIC: CPT

## 2022-05-23 PROCEDURE — 87081 CULTURE SCREEN ONLY: CPT

## 2022-05-23 PROCEDURE — 87635 SARS-COV-2 COVID-19 AMP PRB: CPT

## 2022-05-23 RX ORDER — CLARITHROMYCIN 500 MG/1
500 TABLET, COATED ORAL 2 TIMES DAILY
Qty: 20 TABLET | Refills: 0 | Status: SHIPPED | OUTPATIENT
Start: 2022-05-23 | End: 2022-11-11

## 2022-05-23 RX ORDER — METHYLPREDNISOLONE 4 MG/1
TABLET ORAL
Qty: 1 EACH | Refills: 0 | Status: SHIPPED | OUTPATIENT
Start: 2022-05-23 | End: 2022-11-11

## 2022-05-23 NOTE — TELEPHONE ENCOUNTER
----- Message from ARMANDO Bello sent at 5/23/2022  9:42 AM CDT -----  COVID is negative. Tell him I will send in abx and steroids for him to take

## 2022-05-23 NOTE — TELEPHONE ENCOUNTER
----- Message from ARMANDO Bello sent at 5/23/2022  9:42 AM CDT -----  COVID is negative. Tell him I will send in abx and steroids for him to take   5

## 2022-05-23 NOTE — PROGRESS NOTES
"Chief Complaint  Sinus Problem, Cough, Nasal Congestion, Sore Throat (He works for the school system. He has been sick since Thursday. States he needs covid test. ), Diarrhea, and Nausea    Subjective    History of Present Illness      Patient presents to [facility] for   Since Thursday he has had sinus drainage, congestion, sore throat and cough. Some GI upset also.     Sinus Problem  Associated symptoms include coughing and a sore throat.   Cough  Associated symptoms include a sore throat.   Sore Throat   Associated symptoms include coughing and diarrhea.   Diarrhea   Associated symptoms include coughing.   Nausea  Associated symptoms include coughing, nausea and a sore throat.        Review of Systems   HENT: Positive for sore throat.    Respiratory: Positive for cough.    Gastrointestinal: Positive for diarrhea and nausea.       I have reviewed and agree with the HPI information as above.  Luisana Navarro, APRN     Objective   Vital Signs:   /90   Pulse 72   Temp 98.5 °F (36.9 °C)   Resp 24   Ht 182.9 cm (72\")   Wt (!) 153 kg (338 lb)   BMI 45.84 kg/m²           Physical Exam  Vitals and nursing note reviewed.   Constitutional:       Appearance: Normal appearance.   HENT:      Head: Normocephalic and atraumatic.      Right Ear: Tympanic membrane is erythematous and bulging.      Left Ear: Tympanic membrane is erythematous and bulging.      Nose: Congestion present.      Mouth/Throat:      Pharynx: Posterior oropharyngeal erythema present.   Cardiovascular:      Rate and Rhythm: Normal rate and regular rhythm.      Pulses: Normal pulses.      Heart sounds: Normal heart sounds.   Pulmonary:      Effort: Pulmonary effort is normal.   Musculoskeletal:         General: Normal range of motion.      Cervical back: Normal range of motion and neck supple.   Skin:     General: Skin is warm and dry.   Neurological:      General: No focal deficit present.      Mental Status: " He is alert and oriented to person, place, and time.   Psychiatric:         Mood and Affect: Mood normal.         Behavior: Behavior normal.          REE-7:      PHQ-2 Depression Screening  Little interest or pleasure in doing things? 0-->not at all   Feeling down, depressed, or hopeless? 0-->not at all   PHQ-2 Total Score 0     PHQ-9 Depression Screening  Little interest or pleasure in doing things? 0-->not at all   Feeling down, depressed, or hopeless? 0-->not at all   Trouble falling or staying asleep, or sleeping too much?     Feeling tired or having little energy?     Poor appetite or overeating?     Feeling bad about yourself - or that you are a failure or have let yourself or your family down?     Trouble concentrating on things, such as reading the newspaper or watching television?     Moving or speaking so slowly that other people could have noticed? Or the opposite - being so fidgety or restless that you have been moving around a lot more than usual?     Thoughts that you would be better off dead, or of hurting yourself in some way?     PHQ-9 Total Score 0   If you checked off any problems, how difficult have these problems made it for you to do your work, take care of things at home, or get along with other people?        Result Review  Data Reviewed:                   Assessment and Plan      Problem List Items Addressed This Visit    None     Visit Diagnoses     Close exposure to COVID-19 virus    -  Primary    Relevant Orders    COVID PRE-OP / PRE-PROCEDURE SCREENING ORDER (NO ISOLATION) - Swab, Nasal Cavity (Completed)    Acute sinusitis, recurrence not specified, unspecified location        Relevant Medications    methylPREDNISolone (MEDROL) 4 MG dose pack    clarithromycin (Biaxin) 500 MG tablet        Patient states that he has been sick since last Thursday with cough, congestion, body aches, nausea, and diarrhea. Patient has had direct exposure to covid. \  COVID was negative. Patient does have a  hx of pneumonia. Will treat with abx and steroids.        Follow Up   Return if symptoms worsen or fail to improve.  Patient was given instructions and counseling regarding his condition or for health maintenance advice. Please see specific information pulled into the AVS if appropriate.

## 2022-05-25 LAB — BACTERIA SPEC AEROBE CULT: NORMAL

## 2022-08-21 PROCEDURE — 99284 EMERGENCY DEPT VISIT MOD MDM: CPT

## 2022-08-22 ENCOUNTER — APPOINTMENT (OUTPATIENT)
Dept: GENERAL RADIOLOGY | Age: 32
End: 2022-08-22
Payer: COMMERCIAL

## 2022-08-22 ENCOUNTER — HOSPITAL ENCOUNTER (EMERGENCY)
Age: 32
Discharge: HOME | End: 2022-08-22
Attending: EMERGENCY MEDICINE
Payer: COMMERCIAL

## 2022-08-22 VITALS
BODY MASS INDEX: 46.38 KG/M2 | SYSTOLIC BLOOD PRESSURE: 150 MMHG | HEART RATE: 88 BPM | TEMPERATURE: 97.7 F | RESPIRATION RATE: 17 BRPM | DIASTOLIC BLOOD PRESSURE: 78 MMHG | WEIGHT: 305 LBS | OXYGEN SATURATION: 95 %

## 2022-08-22 DIAGNOSIS — J20.8 ACUTE VIRAL BRONCHITIS: Primary | ICD-10-CM

## 2022-08-22 LAB — SARS-COV-2, NAAT: NOT DETECTED

## 2022-08-22 PROCEDURE — 87635 SARS-COV-2 COVID-19 AMP PRB: CPT

## 2022-08-22 PROCEDURE — 71045 X-RAY EXAM CHEST 1 VIEW: CPT | Performed by: RADIOLOGY

## 2022-08-22 PROCEDURE — 71045 X-RAY EXAM CHEST 1 VIEW: CPT

## 2022-08-22 ASSESSMENT — ENCOUNTER SYMPTOMS
EYE REDNESS: 0
DIARRHEA: 0
ABDOMINAL PAIN: 0
RHINORRHEA: 0
VOICE CHANGE: 0
COUGH: 1
SHORTNESS OF BREATH: 0
VOMITING: 0
EYE PAIN: 0

## 2022-08-22 NOTE — Clinical Note
Victoriano Muller was seen and treated in our emergency department on 8/21/2022. He may return to work on 08/22/2022. If you have any questions or concerns, please don't hesitate to call.       Kei Fournier MD

## 2022-08-22 NOTE — ED PROVIDER NOTES
[facility] EMERGENCY DEPT  EMERGENCY DEPARTMENT ENCOUNTER      Pt Name: Roberto Gonzalez  MRN: [id number]  [place] 1990  Date of evaluation: 8/21/2022  Provider: Eva Goldman MD    CHIEF COMPLAINT       Chief Complaint   Patient presents with    Concern For COVID-19     Has had a cough for several days; not allowed to return to work without covid test         HISTORY OF PRESENT ILLNESS   (Location/Symptom, Timing/Onset,Context/Setting, Quality, Duration, Modifying Factors, Severity)  Note limiting factors. Roberto Gonzalez is a 32 y.o. male who presents to the emergency department for evaluation after having cough over the last 2 to 3 days. Has not had any significant congestion, shortness of breath, fevers, or other associated symptoms. Had pneumonia in the past and was concerned about that. Also says that he had to have a COVID test before he can return to work. Has a history of exercise-induced asthma but has not had any wheezing recently    HPI    NursingNotes were reviewed. REVIEW OF SYSTEMS    (2-9 systems for level 4, 10 or more for level 5)     Review of Systems   Constitutional:  Negative for fatigue and fever. HENT:  Negative for congestion, rhinorrhea and voice change. Eyes:  Negative for pain and redness. Respiratory:  Positive for cough. Negative for shortness of breath. Cardiovascular:  Negative for chest pain. Gastrointestinal:  Negative for abdominal pain, diarrhea and vomiting. Endocrine: Negative. Genitourinary: Negative. Musculoskeletal:  Negative for arthralgias and gait problem. Skin:  Negative for rash and wound. Neurological:  Negative for weakness and headaches. Hematological: Negative. Psychiatric/Behavioral: Negative. All other systems reviewed and are negative. A complete review of systems was performed and is negative except as noted above in the HPI.        PAST MEDICAL HISTORY     Past Medical History:   Diagnosis Date    Obesity     Psoriasis Seizure ([facility] [address]     Sleep apnea          SURGICAL HISTORY       Past Surgical History:   Procedure Laterality Date    TONSILLECTOMY AND ADENOIDECTOMY      TYMPANOSTOMY TUBE PLACEMENT Bilateral 1998         CURRENT MEDICATIONS       Discharge Medication List as of 8/22/2022  1:25 AM        CONTINUE these medications which have NOT CHANGED    Details   topiramate (TOPAMAX) 100 MG tablet Take 100 mg by mouth 2 times dailyHistorical Med      lamoTRIgine (LAMICTAL) 100 MG tablet Take 100 mg by mouth dailyHistorical Med             ALLERGIES     Keppra [levetiracetam]    FAMILY HISTORY       Family History   Problem Relation Age of Onset    High Blood Pressure Father     High Cholesterol Father     Alcohol Abuse Father     Cancer Brother 29        testicular    Diabetes Maternal Uncle     Pancreatic Cancer Maternal Grandmother           SOCIAL HISTORY       Social History     Socioeconomic History    Marital status:    Tobacco Use    Smoking status: Never    Smokeless tobacco: Never   Vaping Use    Vaping Use: Never used   Substance and Sexual Activity    Alcohol use: Yes     Comment: occ    Drug use: No    Sexual activity: Not Currently     Partners: Female       SCREENINGS             PHYSICAL EXAM    (up to 7 for level 4, 8 or more for level 5)     ED Triage Vitals [08/22/22 0011]   BP Temp Temp src Heart Rate Resp SpO2 Height Weight   (!) 150/78 97.7 °F (36.5 °C) -- 88 17 95 % -- (!) 305 lb (138.3 kg)       Physical Exam  Vitals and nursing note reviewed. Constitutional:       General: He is not in acute distress. Appearance: He is well-developed. He is not toxic-appearing or diaphoretic. HENT:      Head: Normocephalic and atraumatic. Mouth/Throat:      Mouth: Mucous membranes are moist.      Pharynx: Oropharynx is clear. Eyes:      General: No scleral icterus. Right eye: No discharge. Left eye: No discharge. Pupils: Pupils are equal, round, and reactive to light. Cardiovascular:      Rate and Rhythm: Normal rate and regular rhythm. Pulmonary:      Effort: Pulmonary effort is normal. No respiratory distress. Breath sounds: No stridor. No wheezing or rhonchi. Abdominal:      General: There is no distension. Musculoskeletal:         General: No deformity. Normal range of motion. Cervical back: Normal range of motion. Skin:     General: Skin is warm and dry. Neurological:      General: No focal deficit present. Mental Status: He is alert and oriented to person, place, and time. GCS: GCS eye subscore is 4. GCS verbal subscore is 5. GCS motor subscore is 6. Cranial Nerves: No cranial nerve deficit. Motor: No abnormal muscle tone. Psychiatric:         Behavior: Behavior normal.         Thought Content: Thought content normal.         Judgment: Judgment normal.       DIAGNOSTIC RESULTS     EKG: All EKG's are interpreted by the Emergency Department Physician who either signs or Co-signs this chart in the absence of a cardiologist.        RADIOLOGY:   Non-plain film images such as CT, Ultrasound and MRI are read by the radiologist. Rondel Sol images are visualized and preliminarily interpreted by the emergency physician with the below findings:        Interpretation per the Radiologist below, if available at the time of this note:    XR CHEST PORTABLE   Final Result   No evidence of acute cardiopulmonary process. Recommendation: Follow up as clinically indicated. Electronically Signed by Morales Strickland at 22-Aug-2022 01:44:02 AM                     ED BEDSIDE ULTRASOUND:   Performed by ED Physician - none    LABS:  Labs Reviewed   COVID-19, RAPID       All other labs were within normal range or not returned as of this dictation.     Medications - No data to display    EMERGENCY DEPARTMENT COURSE and DIFFERENTIALDIAGNOSIS/MDM:   Vitals:    Vitals:    08/22/22 0011   BP: (!) 150/78   Pulse: 88   Resp: 17   Temp: 97.7 °F (36.5 °C) SpO2: 95%   Weight: (!) 305 lb (138.3 kg)       MDM         Evaluation and work-up here revealed no signs of emergent or life-threatening pathology that would necessitate admission for further work-up or management at this time. Patient is felt to be stable for discharge home with return precautions for worsening of the condition or development of new concerning symptoms. Patient was encouraged to follow-up with their primary care doctor in the appropriate timeframe. Necessary prescriptions and information have been provided for treatment at home. Patient voices understanding and agreement with the plan. CONSULTS:  None    PROCEDURES:  Unless otherwise notedbelow, none     Procedures      FINAL IMPRESSION     1. Acute viral bronchitis          DISPOSITION/PLAN   DISPOSITION Decision To Discharge 08/22/2022 01:07:26 AM      No notes of EC Admission Criteria type on file.     PATIENT REFERRED TO:  [facility] EMERGENCY DEPT  [facility]  [phone]    If symptoms worsen    MD Shaun Virk [facility] [id number] [phone]      As needed      DISCHARGE MEDICATIONS:  Discharge Medication List as of 8/22/2022  1:25 AM             (Please note that portions of this note were completed with a voice recognition program.  Efforts were made to edit the dictations butoccasionally words are mis-transcribed.)    Juliet Fung MD (electronically signed)  AttendingEmerSurgical Hospital of [facility] Physician          Juliet Poon MD  08/22/22 7175

## 2022-09-28 ENCOUNTER — TELEPHONE (OUTPATIENT)
Dept: NEUROLOGY | Age: 32
End: 2022-09-28

## 2022-09-28 NOTE — TELEPHONE ENCOUNTER
Lalit Lopes called to schedule a  office visit with Dr. Deon Lew. The pt stated that he had two seizures today. The pt declined to speak with a nurse . Please be advised that the best time to call him to accommodate their needs is Anytime. Thank you.

## 2022-09-30 NOTE — TELEPHONE ENCOUNTER
Called patient to get him scheduled an appointment with Dr. Aramis Lopez. Dr. Aramis Lopez has an opening on 10-24-22 at [phone]. NO answer. Left a VM regarding this information and for patient to call our office back.

## 2022-10-24 ENCOUNTER — TELEPHONE (OUTPATIENT)
Dept: NEUROLOGY | Age: 32
End: 2022-10-24

## 2022-10-24 NOTE — TELEPHONE ENCOUNTER
Josette Snellen requests that clinic return their call. The best time to reach him is Anytime. Josette Snellen stated that he is needing an appointment with Dr. Martha Almanza for a follow up on his seizures any day after 12:30 pm. Please contact patient to advise. Thank you.

## 2022-10-24 NOTE — TELEPHONE ENCOUNTER
Called and spoke with patient about getting his appointment with Dr. Stephani Green rescheduled. Patient is aware of the appointment time/date.

## 2022-11-02 ENCOUNTER — OFFICE VISIT (OUTPATIENT)
Age: 32
End: 2022-11-02
Payer: COMMERCIAL

## 2022-11-02 DIAGNOSIS — Z20.822 ENCOUNTER FOR LABORATORY TESTING FOR COVID-19 VIRUS: Primary | ICD-10-CM

## 2022-11-02 LAB — SARS-COV-2, PCR: NOT DETECTED

## 2022-11-02 PROCEDURE — G8419 CALC BMI OUT NRM PARAM NOF/U: HCPCS | Performed by: NURSE PRACTITIONER

## 2022-11-02 PROCEDURE — G8428 CUR MEDS NOT DOCUMENT: HCPCS | Performed by: NURSE PRACTITIONER

## 2022-11-02 PROCEDURE — G8484 FLU IMMUNIZE NO ADMIN: HCPCS | Performed by: NURSE PRACTITIONER

## 2022-11-02 PROCEDURE — 99212 OFFICE O/P EST SF 10 MIN: CPT | Performed by: NURSE PRACTITIONER

## 2022-11-02 PROCEDURE — 1036F TOBACCO NON-USER: CPT | Performed by: NURSE PRACTITIONER

## 2022-11-03 NOTE — PROGRESS NOTES
Julian Patel presents to the clinic today requesting COVID-19 testing. He complains of cough, sore throat, and fatigue. He has not had positive exposure. On exam, patient appears in no acute distress. Speech is clear. Breathing is unlabored. Moves all extremities and is ambulatory. We will order a COVID test today to be performed in clinic. The patient and appropriate authorities will be informed of the results. He should quarantine at home until results are returned. If he tests positive, he should quarantine for a total of 10 days after symptoms began and 24 hours after fever resolves without fever reducing medications per CDC guidelines. Patient was advised that even if asymptomatic, after a positive result he should quarantine for 10 days per [facility] guidelines. Patient left in stable condition. Total of 20 minutes spent, which includes face to face with patient, record review, evaluation, planning, and education as well as coordination of his care.

## 2022-11-11 ENCOUNTER — OFFICE VISIT (OUTPATIENT)
Dept: FAMILY MEDICINE CLINIC | Facility: CLINIC | Age: 32
End: 2022-11-11

## 2022-11-11 VITALS
DIASTOLIC BLOOD PRESSURE: 87 MMHG | HEART RATE: 99 BPM | SYSTOLIC BLOOD PRESSURE: 124 MMHG | WEIGHT: 315 LBS | TEMPERATURE: 98.6 F | BODY MASS INDEX: 42.66 KG/M2 | HEIGHT: 72 IN | OXYGEN SATURATION: 97 %

## 2022-11-11 DIAGNOSIS — J30.2 SEASONAL ALLERGIES: ICD-10-CM

## 2022-11-11 DIAGNOSIS — R11.0 NAUSEA: ICD-10-CM

## 2022-11-11 DIAGNOSIS — R51.9 ACUTE NONINTRACTABLE HEADACHE, UNSPECIFIED HEADACHE TYPE: Primary | ICD-10-CM

## 2022-11-11 PROCEDURE — 99213 OFFICE O/P EST LOW 20 MIN: CPT

## 2022-11-11 RX ORDER — CETIRIZINE HYDROCHLORIDE 10 MG/1
10 TABLET ORAL DAILY
Qty: 30 TABLET | Refills: 5 | Status: SHIPPED | OUTPATIENT
Start: 2022-11-11 | End: 2023-01-19

## 2022-11-11 NOTE — PROGRESS NOTES
"Chief Complaint  Headache and Nausea    Subjective    History of Present Illness      Patient presents to [facility] for   History of Present Illness  Pt is here today needing a work release. Pt works for [employer].   Pt states he had nausea, some vomirting and a fever that began 11/8. Pt reports he has now been over 24hr without a fever.       Review of Systems   HENT: Positive for congestion.    All other systems reviewed and are negative.      I have reviewed and agree with the HPI and ROS information as above.  ARMANDO Kim     Objective   Vital Signs:   /87   Pulse 99   Temp 98.6 °F (37 °C) (Temporal)   Ht 182.9 cm (72\")   Wt (!) 149 kg (328 lb 9.6 oz)   SpO2 97%   BMI 44.57 kg/m²     Class 3 Severe Obesity (BMI >=40). Obesity-related health conditions include the following: none. Obesity is unchanged. BMI is is above average; no BMI management plan is appropriate. We discussed portion control and increasing exercise.      Physical Exam  Constitutional:       Appearance: Normal appearance. He is well-developed. He is obese.   HENT:      Head: Normocephalic and atraumatic.      Right Ear: Tympanic membrane, ear canal and external ear normal.      Left Ear: Tympanic membrane, ear canal and external ear normal.      Nose: Congestion present. No septal deviation or nasal tenderness.      Mouth/Throat:      Lips: Pink. No lesions.      Mouth: Mucous membranes are moist. No oral lesions.      Dentition: Normal dentition.      Pharynx: Oropharynx is clear. No pharyngeal swelling, oropharyngeal exudate or posterior oropharyngeal erythema.   Eyes:      General: Lids are normal. Vision grossly intact. No scleral icterus.        Right eye: No discharge.         Left eye: No discharge.      Extraocular Movements: Extraocular movements intact.      Conjunctiva/sclera: Conjunctivae normal.      Right eye: Right conjunctiva is not injected.      Left eye: Left " conjunctiva is not injected.      Pupils: Pupils are equal, round, and reactive to light.   Neck:      Thyroid: No thyroid mass.      Trachea: Trachea normal.   Cardiovascular:      Rate and Rhythm: Normal rate and regular rhythm.      Heart sounds: Normal heart sounds. No murmur heard.    No gallop.   Pulmonary:      Effort: Pulmonary effort is normal.      Breath sounds: Normal breath sounds and air entry. No wheezing, rhonchi or rales.   Abdominal:      General: There is no distension.      Palpations: Abdomen is soft. There is no mass.      Tenderness: There is no abdominal tenderness. There is no right CVA tenderness, left CVA tenderness, guarding or rebound.   Musculoskeletal:         General: No tenderness or deformity. Normal range of motion.      Cervical back: Full passive range of motion without pain, normal range of motion and neck supple.      Thoracic back: Normal.      Right lower leg: No edema.      Left lower leg: No edema.   Skin:     General: Skin is warm and dry.      Coloration: Skin is not jaundiced.      Findings: No rash.   Neurological:      Mental Status: He is alert and oriented to person, place, and time.      Cranial Nerves: Cranial nerves are intact.      Sensory: Sensation is intact.      Motor: Motor function is intact.      Coordination: Coordination is intact.      Gait: Gait is intact.      Deep Tendon Reflexes: Reflexes are normal and symmetric.   Psychiatric:         Mood and Affect: Mood and affect normal.         Judgment: Judgment normal.          REE-7:      PHQ-2 Depression Screening  Little interest or pleasure in doing things?     Feeling down, depressed, or hopeless?     PHQ-2 Total Score       PHQ-9 Depression Screening  Little interest or pleasure in doing things?     Feeling down, depressed, or hopeless?     Trouble falling or staying asleep, or sleeping too much?     Feeling tired or having little energy?     Poor appetite or overeating?     Feeling bad about yourself  - or that you are a failure or have let yourself or your family down?     Trouble concentrating on things, such as reading the newspaper or watching television?     Moving or speaking so slowly that other people could have noticed? Or the opposite - being so fidgety or restless that you have been moving around a lot more than usual?     Thoughts that you would be better off dead, or of hurting yourself in some way?     PHQ-9 Total Score     If you checked off any problems, how difficult have these problems made it for you to do your work, take care of things at home, or get along with other people?        Result Review  Data Reviewed:                   Assessment and Plan      Diagnoses and all orders for this visit:    1. Acute nonintractable headache, unspecified headache type (Primary)    2. Nausea    3. Seasonal allergies  -     cetirizine (zyrTEC) 10 MG tablet; Take 1 tablet by mouth Daily.  Dispense: 30 tablet; Refill: 5    Patient is seen today due to needing a letter to return to work. Patient began having fatigue, nausea and headache on Tuesday. Patient then developed fatigue and fever on Wednesday. Patient has not had a fever since yesterday. Patients son is now sick and will have to be off till Tuesday. Patient was swabbed for covid which was negative. 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 Patient is also requesting a script for zyrtec.     Plan  1. Must be fever free 24 hours before returning to work  2. Work excuse given for patient to RTW on Tuesday.  3. Zyrtec sent to pharmacy       Follow Up   Return if symptoms worsen or fail to improve.  Patient was given instructions and counseling regarding his condition or for health maintenance advice. Please see specific information pulled into the AVS if appropriate.

## 2022-11-14 ENCOUNTER — APPOINTMENT (OUTPATIENT)
Dept: CT IMAGING | Age: 32
DRG: 885 | End: 2022-11-14
Payer: COMMERCIAL

## 2022-11-14 ENCOUNTER — HOSPITAL ENCOUNTER (INPATIENT)
Age: 32
LOS: 4 days | Discharge: HOME | DRG: 885 | End: 2022-11-18
Attending: PSYCHIATRY & NEUROLOGY | Admitting: PSYCHIATRY & NEUROLOGY
Payer: COMMERCIAL

## 2022-11-14 DIAGNOSIS — F32.A DEPRESSION, UNSPECIFIED DEPRESSION TYPE: ICD-10-CM

## 2022-11-14 DIAGNOSIS — T14.91XA SUICIDE ATTEMPT (HCC): Primary | ICD-10-CM

## 2022-11-14 DIAGNOSIS — R45.851 SUICIDAL IDEATION: ICD-10-CM

## 2022-11-14 LAB
ALBUMIN SERPL-MCNC: 4.7 G/DL (ref 3.5–5.2)
ALP BLD-CCNC: 72 U/L (ref 40–130)
ALT SERPL-CCNC: 67 U/L (ref 5–41)
AMPHETAMINE SCREEN, URINE: NEGATIVE
ANION GAP SERPL CALCULATED.3IONS-SCNC: 11 MMOL/L (ref 7–19)
AST SERPL-CCNC: 48 U/L (ref 5–40)
BARBITURATE SCREEN URINE: NEGATIVE
BASOPHILS ABSOLUTE: 0.1 K/UL (ref 0–0.2)
BASOPHILS RELATIVE PERCENT: 0.6 % (ref 0–1)
BENZODIAZEPINE SCREEN, URINE: NEGATIVE
BILIRUB SERPL-MCNC: 0.5 MG/DL (ref 0.2–1.2)
BUN BLDV-MCNC: 13 MG/DL (ref 6–20)
BUPRENORPHINE URINE: NEGATIVE
CALCIUM SERPL-MCNC: 9.4 MG/DL (ref 8.6–10)
CANNABINOID SCREEN URINE: NEGATIVE
CHLORIDE BLD-SCNC: 102 MMOL/L (ref 98–111)
CO2: 26 MMOL/L (ref 22–29)
COCAINE METABOLITE SCREEN URINE: NEGATIVE
CREAT SERPL-MCNC: 0.9 MG/DL (ref 0.5–1.2)
EOSINOPHILS ABSOLUTE: 0.2 K/UL (ref 0–0.6)
EOSINOPHILS RELATIVE PERCENT: 2.1 % (ref 0–5)
ETHANOL: <10 MG/DL (ref 0–0.08)
GFR SERPL CREATININE-BSD FRML MDRD: >60 ML/MIN/{1.73_M2}
GLUCOSE BLD-MCNC: 83 MG/DL (ref 74–109)
HCT VFR BLD CALC: 47.3 % (ref 42–52)
HEMOGLOBIN: 16 G/DL (ref 14–18)
IMMATURE GRANULOCYTES #: 0 K/UL
LYMPHOCYTES ABSOLUTE: 3.9 K/UL (ref 1.1–4.5)
LYMPHOCYTES RELATIVE PERCENT: 36.1 % (ref 20–40)
Lab: NORMAL
MCH RBC QN AUTO: 29.5 PG (ref 27–31)
MCHC RBC AUTO-ENTMCNC: 33.8 G/DL (ref 33–37)
MCV RBC AUTO: 87.1 FL (ref 80–94)
METHADONE SCREEN, URINE: NEGATIVE
METHAMPHETAMINE, URINE: NEGATIVE
MONOCYTES ABSOLUTE: 0.5 K/UL (ref 0–0.9)
MONOCYTES RELATIVE PERCENT: 5 % (ref 0–10)
NEUTROPHILS ABSOLUTE: 6 K/UL (ref 1.5–7.5)
NEUTROPHILS RELATIVE PERCENT: 55.8 % (ref 50–65)
OPIATE SCREEN URINE: NEGATIVE
OXYCODONE URINE: NEGATIVE
PDW BLD-RTO: 12.6 % (ref 11.5–14.5)
PHENCYCLIDINE SCREEN URINE: NEGATIVE
PLATELET # BLD: 290 K/UL (ref 130–400)
PMV BLD AUTO: 9.8 FL (ref 9.4–12.4)
POTASSIUM SERPL-SCNC: 3.9 MMOL/L (ref 3.5–5)
PROPOXYPHENE SCREEN: NEGATIVE
RBC # BLD: 5.43 M/UL (ref 4.7–6.1)
SARS-COV-2, NAAT: NOT DETECTED
SODIUM BLD-SCNC: 139 MMOL/L (ref 136–145)
TOTAL PROTEIN: 7 G/DL (ref 6.6–8.7)
TRICYCLIC, URINE: NEGATIVE
WBC # BLD: 10.8 K/UL (ref 4.8–10.8)

## 2022-11-14 PROCEDURE — 36415 COLL VENOUS BLD VENIPUNCTURE: CPT

## 2022-11-14 PROCEDURE — 87635 SARS-COV-2 COVID-19 AMP PRB: CPT

## 2022-11-14 PROCEDURE — 80053 COMPREHEN METABOLIC PANEL: CPT

## 2022-11-14 PROCEDURE — 80306 DRUG TEST PRSMV INSTRMNT: CPT

## 2022-11-14 PROCEDURE — 70450 CT HEAD/BRAIN W/O DYE: CPT

## 2022-11-14 PROCEDURE — 82077 ASSAY SPEC XCP UR&BREATH IA: CPT

## 2022-11-14 PROCEDURE — 6370000000 HC RX 637 (ALT 250 FOR IP): Performed by: PSYCHIATRY & NEUROLOGY

## 2022-11-14 PROCEDURE — 1240000000 HC EMOTIONAL WELLNESS R&B

## 2022-11-14 PROCEDURE — 70450 CT HEAD/BRAIN W/O DYE: CPT | Performed by: RADIOLOGY

## 2022-11-14 PROCEDURE — 99285 EMERGENCY DEPT VISIT HI MDM: CPT

## 2022-11-14 PROCEDURE — 85025 COMPLETE CBC W/AUTO DIFF WBC: CPT

## 2022-11-14 PROCEDURE — 6370000000 HC RX 637 (ALT 250 FOR IP): Performed by: PHYSICIAN ASSISTANT

## 2022-11-14 RX ORDER — TRAZODONE HYDROCHLORIDE 50 MG/1
50 TABLET ORAL NIGHTLY PRN
Status: DISCONTINUED | OUTPATIENT
Start: 2022-11-14 | End: 2022-11-18 | Stop reason: HOSPADM

## 2022-11-14 RX ORDER — ACETAMINOPHEN 325 MG/1
650 TABLET ORAL EVERY 4 HOURS PRN
Status: DISCONTINUED | OUTPATIENT
Start: 2022-11-14 | End: 2022-11-18 | Stop reason: HOSPADM

## 2022-11-14 RX ORDER — CLONIDINE HYDROCHLORIDE 0.1 MG/1
0.1 TABLET ORAL ONCE
Status: COMPLETED | OUTPATIENT
Start: 2022-11-14 | End: 2022-11-14

## 2022-11-14 RX ORDER — POLYETHYLENE GLYCOL 3350 17 G/17G
17 POWDER, FOR SOLUTION ORAL DAILY PRN
Status: DISCONTINUED | OUTPATIENT
Start: 2022-11-14 | End: 2022-11-18 | Stop reason: HOSPADM

## 2022-11-14 RX ORDER — HYDROXYZINE HYDROCHLORIDE 25 MG/1
25 TABLET, FILM COATED ORAL 3 TIMES DAILY PRN
Status: DISCONTINUED | OUTPATIENT
Start: 2022-11-14 | End: 2022-11-18 | Stop reason: HOSPADM

## 2022-11-14 RX ADMIN — HYDROXYZINE HYDROCHLORIDE 25 MG: 25 TABLET, FILM COATED ORAL at 22:54

## 2022-11-14 RX ADMIN — CLONIDINE HYDROCHLORIDE 0.1 MG: 0.1 TABLET ORAL at 19:51

## 2022-11-14 RX ADMIN — TRAZODONE HYDROCHLORIDE 50 MG: 50 TABLET ORAL at 22:54

## 2022-11-14 ASSESSMENT — PAIN SCALES - GENERAL
PAINLEVEL_OUTOF10: 4
PAINLEVEL_OUTOF10: 5

## 2022-11-14 ASSESSMENT — ENCOUNTER SYMPTOMS
PHOTOPHOBIA: 0
SHORTNESS OF BREATH: 0
EYE DISCHARGE: 0
EYES NEGATIVE: 1
COLOR CHANGE: 0
RESPIRATORY NEGATIVE: 1
COUGH: 0
GASTROINTESTINAL NEGATIVE: 1
BACK PAIN: 0
APNEA: 0
EYE ITCHING: 0

## 2022-11-14 ASSESSMENT — PAIN - FUNCTIONAL ASSESSMENT: PAIN_FUNCTIONAL_ASSESSMENT: 0-10

## 2022-11-14 ASSESSMENT — PAIN DESCRIPTION - LOCATION
LOCATION: CHEST
LOCATION: BACK

## 2022-11-14 ASSESSMENT — PAIN DESCRIPTION - FREQUENCY: FREQUENCY: CONTINUOUS

## 2022-11-14 ASSESSMENT — PAIN DESCRIPTION - DESCRIPTORS
DESCRIPTORS: ACHING
DESCRIPTORS: ACHING

## 2022-11-14 ASSESSMENT — SLEEP AND FATIGUE QUESTIONNAIRES
DO YOU USE A SLEEP AID: NO
DO YOU HAVE DIFFICULTY SLEEPING: YES

## 2022-11-15 PROBLEM — F33.2 MAJOR DEPRESSIVE DISORDER, RECURRENT SEVERE WITHOUT PSYCHOTIC FEATURES (HCC): Status: ACTIVE | Noted: 2022-11-15

## 2022-11-15 PROCEDURE — 6370000000 HC RX 637 (ALT 250 FOR IP): Performed by: PSYCHIATRY & NEUROLOGY

## 2022-11-15 PROCEDURE — 90792 PSYCH DIAG EVAL W/MED SRVCS: CPT | Performed by: PSYCHIATRY & NEUROLOGY

## 2022-11-15 PROCEDURE — 1240000000 HC EMOTIONAL WELLNESS R&B

## 2022-11-15 RX ORDER — ESCITALOPRAM OXALATE 5 MG/1
5 TABLET ORAL DAILY
Status: DISCONTINUED | OUTPATIENT
Start: 2022-11-15 | End: 2022-11-18 | Stop reason: HOSPADM

## 2022-11-15 RX ORDER — IBUPROFEN 400 MG/1
800 TABLET ORAL EVERY 6 HOURS PRN
Status: DISCONTINUED | OUTPATIENT
Start: 2022-11-15 | End: 2022-11-18 | Stop reason: HOSPADM

## 2022-11-15 RX ADMIN — ESCITALOPRAM 5 MG: 5 TABLET, FILM COATED ORAL at 10:56

## 2022-11-15 RX ADMIN — IBUPROFEN 800 MG: 400 TABLET, FILM COATED ORAL at 10:55

## 2022-11-15 RX ADMIN — HYDROXYZINE HYDROCHLORIDE 25 MG: 25 TABLET, FILM COATED ORAL at 21:57

## 2022-11-15 RX ADMIN — TRAZODONE HYDROCHLORIDE 50 MG: 50 TABLET ORAL at 21:57

## 2022-11-15 ASSESSMENT — PAIN DESCRIPTION - ORIENTATION
ORIENTATION: ANTERIOR
ORIENTATION: ANTERIOR

## 2022-11-15 ASSESSMENT — PAIN DESCRIPTION - FREQUENCY
FREQUENCY: INTERMITTENT
FREQUENCY: INTERMITTENT

## 2022-11-15 ASSESSMENT — PAIN - FUNCTIONAL ASSESSMENT
PAIN_FUNCTIONAL_ASSESSMENT: PREVENTS OR INTERFERES SOME ACTIVE ACTIVITIES AND ADLS
PAIN_FUNCTIONAL_ASSESSMENT: PREVENTS OR INTERFERES SOME ACTIVE ACTIVITIES AND ADLS

## 2022-11-15 ASSESSMENT — PAIN DESCRIPTION - DESCRIPTORS
DESCRIPTORS: SHARP
DESCRIPTORS: SHARP

## 2022-11-15 ASSESSMENT — PAIN SCALES - GENERAL
PAINLEVEL_OUTOF10: 3
PAINLEVEL_OUTOF10: 6

## 2022-11-15 ASSESSMENT — PAIN DESCRIPTION - LOCATION
LOCATION: HEAD
LOCATION: HEAD

## 2022-11-15 ASSESSMENT — PAIN DESCRIPTION - ONSET
ONSET: GRADUAL
ONSET: GRADUAL

## 2022-11-15 ASSESSMENT — PAIN DESCRIPTION - PAIN TYPE
TYPE: ACUTE PAIN
TYPE: ACUTE PAIN

## 2022-11-15 ASSESSMENT — PATIENT HEALTH QUESTIONNAIRE - PHQ9: SUM OF ALL RESPONSES TO PHQ QUESTIONS 1-9: 18

## 2022-11-15 NOTE — PROGRESS NOTES
SW met with patient to complete psychosocial and lifetime CSSR-S on this date. Patients long and short-term goals discussed. Patient voiced understanding. Treatment plan sheet signed. Patient verbalized understanding of the treatment plan. Patient participated in goals and objectives of the treatment plan. Patient discussed safety plan with . SW explained treatment goals with pt. Decreasing depression and anxiety by improvement of positive coping patterns was discussed. Pt acknowledged understanding of treatment goals and signed treatment plan signature sheet. In the last 6 months has the patient been a danger to self: Yes  In the last 6 months has the patient been a danger to others: No  Legal Guardian/POA: No    Provided patient with [employer] handout entitled \"Quitting Smoking. \"  Reviewed handout with patient: addressing dangers of smoking, developing coping skills, and providing basic information about quitting. Patient received all components practical counseling of tobacco practical counseling during the hospital stay.

## 2022-11-15 NOTE — PROGRESS NOTES
[facility] Adult Unit Daily Assessment  Nursing Progress Note    Room: 06/607-02   Name: Sylvia Elizalde   Age: 28 y.o. Gender: male   Dx: Depression with suicidal ideation  Precautions: suicide risk  Inpatient Status: voluntary       SLEEP:  Sleep Quality Good  Sleep Medications:    PRN Sleep Meds:        MEDICAL:  Other PRN Meds:    Med Compliant:   Accu-Chek:   Oxygen/CPAP/BiPAP:   CIWA/CINA:    PAIN Assessment: yes  Side Effects from medication: No      Metabolic Screening:  Lab Results   Component Value Date    LABA1C 5.7 02/27/2020     Lab Results   Component Value Date    CHOL 166 02/27/2020     Lab Results   Component Value Date    TRIG 222 (H) 02/27/2020     Lab Results   Component Value Date    HDL 33 (L) 02/27/2020     No components found for: LDLCAL  No results found for: LABVLDL  Body mass index is 44.62 kg/m². BP Readings from Last 2 Encounters:   11/15/22 111/77   08/22/22 (!) 150/78         Medical Bed:   Is patient in a medical bed? no   If medical bed is in use, has nursing secured room while patient is awake and out of the room? NA  Has safety checks by nursing been completed on the bed/room this shift? NA    Protective Factors:  Patient identifies protective factors with nursing staff as follows: Identifies reasons for living: Yes   Supportive Social Network or family: Yes    Belief that suicide is immoral/high spirituality: Yes   Responsibility to family or others/living with family: Yes   Fear of death or dying due to pain and suffering: Yes   Engaged in work or school: Yes     If Patient is unable to identify, reason why? PSYCH:  Depression: 5   Anxiety: 7   SI denies suicidal ideation   Risk of Suicide: No Risk  HI Negative for homicidal ideation      AVH:no If Hallucinations are present, describe? GENERAL:  Appetite: good   Percent Meals: 75%   Social: Yes   Speech: normal   Appearance: appropriately dressed and healthy looking. Obese.      GROUP:  Group Participation: Yes  Participation Quality: Active Listener and Interactive    Notes: Patient slept through breakfast and community group. He met with his provider and orders are noted. Lexapro 5 mg given. Lab ordered for tomorrow. He is calm and cooperative. He is social with the peers and staff. He has been on the phone talking with his mother. Patient showered late this afternoon.          Electronically signed by Tara Abad RN on 11/15/22 at 2:49 PM CST

## 2022-11-15 NOTE — PLAN OF CARE
Group Therapy Note     Date: 11/15/2022  Start Time: 1100  End Time:  1130  Number of Participants: 13     Type of Group: Psychoeducation     Wellness Binder Information  Module Name:  staying well  Session Number:  1     Patient's Goal:  daily maintenance and coping skills     Notes:  pt was verbally prompted to attend group. Pt refused. Information about coping skills was provided. Status After Intervention:       Participation Level:      Participation Quality:         Speech:           Thought Process/Content:         Affective Functioning:         Mood:         Level of consciousness:          Response to Learning:         Endings:      Modes of Intervention:         Discipline Responsible: Psychoeducational Specialist        Signature:  Sanaz Vazquez

## 2022-11-15 NOTE — ED NOTES
Pt states he [place] himself to [place] around 2pm and has been walking around the parking lot debating to come inside. Pt then called police to make him come inside. Pt states around 5pm, [place]. \"I didn't do it, because I know it would hurt other people. \"      Baron Faith RN  11/14/22 Nara Rueda
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
## 2022-11-15 NOTE — PLAN OF CARE
Problem: Self Harm/Suicidality  Goal: Will have no self-injury during hospital stay  Description: INTERVENTIONS:  1. Q 30 MINUTES: Routine safety checks  2. Q SHIFT & PRN: Assess risk to determine if routine checks are adequate to maintain patient safety  Outcome: Progressing     Group Therapy Note     Date: 11/15/2022  Start Time: 1000  End Time:  6755  Number of Participants: 12     Type of Group: Psychotherapy     Patient's Goal: Patient will process emotions and actions and how to relate to other or their with others. Patient discussed open communication and feelings and emotions. Notes:  Patient attended process group as scheduled, patient identified a issue to work on today regarding how they will interact and relate to others. Status After Intervention:  Improved     Participation Level:  Active Listener     Participation Quality: Appropriate, Attentive, and Sharing        Speech:  normal        Thought Process/Content: Logical        Affective Functioning: Congruent        Mood: euthymic        Level of consciousness:  Alert        Response to Learning: Able to verbalize current knowledge/experience        Endings: None Reported     Modes of Intervention: Education, Support, and Socialization        Discipline Responsible: /Counselor        Signature:  Saintclair Hopes, [facility] [place]

## 2022-11-15 NOTE — PROGRESS NOTES
PT arrived on the unit dressed in paper scrubs. Escorted by security and staff from the ED.     Electronically signed by Karime Delgado RN on 11/15/2022 at 5:11 AM

## 2022-11-15 NOTE — ED PROVIDER NOTES
[facility] EMERGENCY DEPT  EMERGENCY DEPARTMENT ENCOUNTER      Pt Name: Velia Mcnulty  MRN: [id number]  [place] 1990  Date of evaluation: 11/14/2022  Provider: Nataliia Aponte PA-C    CHIEF COMPLAINT       Chief Complaint   Patient presents with    Mental Health Problem     Pt states has had SI thoughts for years, but today, \"I thought I was going to actually go through with it. \"    Pt very hesitant to talk. \"Oh I have thought about it a lot today. \"          HISTORY OF PRESENT ILLNESS   (Location/Symptom, Timing/Onset, Context/Setting, Quality, Duration, Modifying Factors, Severity)  Note limiting factors. HPI      Velia Mcnulty is a 28 y.o. male who presents to the emergency department for suicidal ideation. PMH significant for history of seizures, psoriasis. Patient states he has struggled on and off for a while with depression and over the past couple months has been working to get in with his family care provider. Patient states he had made an appointment with Sammie Pena 12 a month ago but missed it because \"I felt like some things in my life are getting better. \"  Patient states \"I am going through a a lot and I am not sure if I am ready to talk about it yet. \"  Patient states that today he felt overwhelmed and hopeless at which time he tried to run out in front of traffic with the intent on killing himself. Patient states that he did have to hop over a fence and sustained some scratches from thorns in the process but denies any other injuries and reports that he was not contacted by the vehicle. Patient states he had no other attempts at trying to harm himself and including no medication overdoses. Patient denies homicidal ideation, visual hallucinations, or auditory hallucinations. Patient states that he has recently been trying to get in with his primary care provider as well and feels that he needs help dealing with his anxiety and depression.   Patient states that this is the first time he has ever acted on suicidal thoughts and he has never had any inpatient treatment prior. Patient did report that sometimes he will smoke or eat marijuana products and feels this helps with his symptoms. Patient last smoked marijuana this weekend and had 4-5 drinks stating that he very rarely drinks however celebrated at a friend's birthday party. Patient denies use of tobacco products or any other IV/recreational/illicit drugs. Patient states he has been home around his children who have recently had coughs and colds but denies any personal illnesses or other injuries. Records reviewed show patient was last seen in the ED on 8/22/2022 for acute viral bronchitis. Patient was recently seen in the outpatient office at the PCP on 11/11/2022 for non-intractable headache, nausea, allergies. Nursing Notes were reviewed. REVIEW OF SYSTEMS    (2-9 systems for level 4, 10 or more for level 5)     Review of Systems   Constitutional: Negative. HENT: Negative. Eyes: Negative. Respiratory: Negative. Cardiovascular: Negative. Gastrointestinal: Negative. Genitourinary: Negative. Musculoskeletal: Negative. Skin: Negative. Neurological: Negative. Psychiatric/Behavioral:  Positive for suicidal ideas. Negative for hallucinations (denies auditory or visual) and self-injury. Denies homicidal ideation    All other systems reviewed and are negative. Except as noted above the remainder of the review of systems was reviewed and negative.        PAST MEDICAL HISTORY     Past Medical History:   Diagnosis Date    Obesity     Psoriasis     Seizure ([facility] [address]     Sleep apnea          SURGICAL HISTORY       Past Surgical History:   Procedure Laterality Date    TONSILLECTOMY AND ADENOIDECTOMY      TYMPANOSTOMY TUBE PLACEMENT Bilateral 1998         CURRENT MEDICATIONS       Previous Medications    LAMOTRIGINE (LAMICTAL) 100 MG TABLET    Take 100 mg by mouth daily    TOPIRAMATE (TOPAMAX) 100 MG TABLET Take 100 mg by mouth 2 times daily       ALLERGIES     Keppra [levetiracetam]    FAMILY HISTORY       Family History   Problem Relation Age of Onset    High Blood Pressure Father     High Cholesterol Father     Alcohol Abuse Father     Cancer Brother 29        testicular    Diabetes Maternal Uncle     Pancreatic Cancer Maternal Grandmother           SOCIAL HISTORY       Social History     Socioeconomic History    Marital status:      Spouse name: None    Number of children: None    Years of education: None    Highest education level: None   Tobacco Use    Smoking status: Never    Smokeless tobacco: Never   Vaping Use    Vaping Use: Never used   Substance and Sexual Activity    Alcohol use: Yes     Comment: occ    Drug use: Yes     Types: Marijuana (Weed)     Comment: rare    Sexual activity: Not Currently     Partners: Female       SCREENINGS         Christophre Coma Scale  Eye Opening: Spontaneous  Best Verbal Response: Oriented  Best Motor Response: Obeys commands  [place] Coma Scale Score: 15                     CIWA Assessment  BP: (!) 156/108  Heart Rate: 86                 PHYSICAL EXAM    (up to 7 for level 4, 8 or more for level 5)     ED Triage Vitals [11/14/22 1816]   BP Temp Temp Source Heart Rate Resp SpO2 Height Weight   (!) 156/108 98.7 °F (37.1 °C) Tympanic 86 20 96 % 6' (1.829 m) (!) 320 lb (145.2 kg)       Physical Exam  Vitals and nursing note reviewed. Constitutional:       Appearance: Normal appearance. He is well-developed and well-groomed. He is obese. He is not toxic-appearing or diaphoretic. Neurological:      Mental Status: He is alert. Psychiatric:         Attention and Perception: Attention normal. He is attentive. He does not perceive auditory or visual hallucinations. Mood and Affect: Mood is anxious and depressed. Affect is flat and tearful. Speech: Speech normal.         Behavior: Behavior normal. Behavior is cooperative.        DIAGNOSTIC RESULTS Interpretation per the Radiologist below, if available at the time of this note:    No orders to display         LABS:  Labs Reviewed   COVID-19, RAPID   DRUG SCRN, BUPRENORPHINE   CBC WITH AUTO DIFFERENTIAL   COMPREHENSIVE METABOLIC PANEL   ETHANOL       All other labs were within normal range or not returned as of this dictation. EMERGENCY DEPARTMENT COURSE and DIFFERENTIAL DIAGNOSIS/MDM:   Vitals:    Vitals:    11/14/22 1816   BP: (!) 156/108   Pulse: 86   Resp: 20   Temp: 98.7 °F (37.1 °C)   TempSrc: Tympanic   SpO2: 96%   Weight: (!) 320 lb (145.2 kg)   Height: 6' (1.829 m)           MDM     Amount and/or Complexity of Data Reviewed  Clinical lab tests: ordered and reviewed  Decide to obtain previous medical records or to obtain history from someone other than the patient: yes  Review and summarize past medical records: yes  Discuss the patient with other providers: yes (Mr. Cheryl Quintero PA-C)    Patient Progress  Patient progress: stable        REASSESSMENT     ED Course as of 11/14/22 1904 Mon Nov 14, 2022 1903 Case discussed at this time with Mr. Cheryl Quintero PA-C. Pending results of labs patient will be evaluated by behavioral health specialist for further disposition planning. Please see Mr. Bridgette Suh note below for further ED course as well as final diagnosis. Working diagnosis: Depression, suicide attempt, suicidal ideations. [JS]      ED Course User Index  [JS] Jaja Becker PA-C       CONSULTS:  None    PROCEDURES:  Unless otherwise noted below, none     Procedures            FINAL IMPRESSION      1. Suicide attempt ([address]    2. Suicidal ideation    3. Depression, unspecified depression type          DISPOSITION/PLAN   DISPOSITION        PATIENT REFERRED TO:  No follow-up provider specified. DISCHARGE MEDICATIONS:  New Prescriptions    No medications on file     Controlled Substances Monitoring:     No flowsheet data found.     (Please note that portions of this note were completed with a voice recognition program.  Efforts were made to edit the dictations but occasionally words are mis-transcribed.)    Noe Alfredo PA-C (electronically signed)           Noe Alfredo PA-C  11/14/22 [id number]

## 2022-11-15 NOTE — PROGRESS NOTES
Treatment Team Note:    Target Symptoms/Reason for admission: Per nursing admission assessment - Reason for Admission: Velia Mcnulty is a 28 y.o. male who presents to the emergency department with history of depression not currently on any medication he denies any thoughts of HI or hallucinations. Has had intermittent thoughts of SI without acting up on for multiple years. Tells me he thought about it today and was actually seen walking out into traffic. An innocent bystander called the police who brought him here. Diagnoses per psych provider: Suicidal ideation [R45.851]  Suicide attempt ([address] Skye Scot  Depression, unspecified depression type [F32. A]  Depression with suicidal ideation [F32. [place]    Therapist met with treatment team to discuss patients treatment and discharge plans. Patient's aftercare plan is: SW will meet with patient to gather information    Aftercare appointments made: No - SW will make discharge appointments    Pt lives with: SW will meet with patient to gather information    Collateral obtained from: SW will meet with patient to gather information  Collateral obtained on:new admissions    Attending groups: New admission - SW will monitor group attendance    Behavior: calm    Has patient been completing ADL's:  New admission - unknown at this time - SW will monitor    SI:  patient denies SI  Plan: no   If yes describe: N/A - patient denies plan  HI:  patient denies HI  If present describe: N/A  Delusions: patient denies delusions  If present describe: N/A  Hallucinations: patient denies hallucinations  If present describe: N/A    Patient rates their -- Depression: 1-10:  0  Anxiety:1-10:  0    Sleeping:New admission - unknown at this time. Taking medication: New admission - unknown at this time.     Misc:

## 2022-11-15 NOTE — PROGRESS NOTES
Admission Note      Reason for admission/Target Symptom: Per nursing admission assessment - Reason for Admission: Halle Garibay is a 28 y.o. male who presents to the emergency department with history of depression not currently on any medication he denies any thoughts of HI or hallucinations. Has had intermittent thoughts of SI without acting up on for multiple years. Tells me he thought about it today and was actually seen walking out into traffic. An innocent bystander called the police who brought him here. Diagnoses: Depression NOS  UDS: Neg  BAL:  Neg    SW will meet with treatment team to discuss patient's treatment including care planning, discharge planning, and follow-up needs. Patient has been admitted to [facility] Unit. Treatment team will identify the patient's discharge needs. Appointments will be made for medication management and outpatient therapy/counseling. Pt confirmed the need for ongoing treatment post inpatient stay. Pt was also provided a handout of contact information for drug and alcohol treatment centers and other community support service such as [facility], AA, and [facility].

## 2022-11-15 NOTE — PLAN OF CARE
Problem: Self Harm/Suicidality  Goal: Will have no self-injury during hospital stay  Description: INTERVENTIONS:  1. Q 30 MINUTES: Routine safety checks  2. Q SHIFT & PRN: Assess risk to determine if routine checks are adequate to maintain patient safety  11/15/2022 1143 by Fabrice Perkins RN  Outcome: Progressing  11/15/2022 1106 by Hafsa Jones LPC  Outcome: Progressing     Problem: Pain  Goal: Verbalizes/displays adequate comfort level or baseline comfort level  Outcome: Progressing

## 2022-11-15 NOTE — ED PROVIDER NOTES
[facility] 6 ADULT [facility]  eMERGENCYdEPARTMENT eNCOUnter      Pt Name: Darío Arrieta  MRN: [id number]  [place] 1990  Date of evaluation: 11/14/2022  Provider:SASHA Carbone    CHIEF COMPLAINT       Chief Complaint   Patient presents with    Mental Health Problem     Pt states has had SI thoughts for years, but today, \"I thought I was going to actually go through with it. \"    Pt very hesitant to talk. \"Oh I have thought about it a lot today. \"          HISTORY OF PRESENT ILLNESS  (Location/Symptom, Timing/Onset, Context/Setting, Quality, Duration, Modifying Factors, Severity.)   Darío Arrieta is a 28 y.o. male who presents to the emergency department with history of depression not currently on any medication he denies any thoughts of HI or hallucinations. Has had intermittent thoughts of SI without acting up on for multiple years. Tells me he thought about it today and was actually seen walking out into traffic. An innocent bystander called the police who brought him here. He is voluntary for admission we do not have a psychiatric nurse on-call my goal will be to make sure he is medically care layered and talk to Dr. Blanquita Cardoso with psych for admission. He tells me this is kind of a build of issues sounds like his mother has wanted him to get help for some time. Seizure hx HTN new onset has mild headache. Denies chest pain or SOA. HPI    Nursing Notes were reviewed and I agree. REVIEW OF SYSTEMS    (2-9 systems for level 4, 10 or more for level 5)     Review of Systems   Constitutional:  Negative for activity change, appetite change, chills and fever. HENT:  Negative for congestion and dental problem. Eyes:  Negative for photophobia, discharge and itching. Respiratory:  Negative for apnea, cough and shortness of breath. Cardiovascular:  Negative for chest pain. Musculoskeletal:  Negative for arthralgias, back pain, gait problem, myalgias and neck pain.    Skin:  Negative for color change, pallor and rash. Neurological:  Positive for headaches. Negative for dizziness, seizures and syncope. Psychiatric/Behavioral:  Positive for suicidal ideas. Negative for agitation. The patient is not nervous/anxious. Except as noted above the remainder of the review of systems was reviewed and negative.        PAST MEDICAL HISTORY     Past Medical History:   Diagnosis Date    Obesity     Psoriasis     Seizure ([address]     Sleep apnea          SURGICAL HISTORY       Past Surgical History:   Procedure Laterality Date    TONSILLECTOMY AND ADENOIDECTOMY      TYMPANOSTOMY TUBE PLACEMENT Bilateral 1998         CURRENT MEDICATIONS       Current Discharge Medication List        CONTINUE these medications which have NOT CHANGED    Details   lamoTRIgine (LAMICTAL) 100 MG tablet Take 100 mg by mouth daily             ALLERGIES     Keppra [levetiracetam]    FAMILY HISTORY       Family History   Problem Relation Age of Onset    High Blood Pressure Father     High Cholesterol Father     Alcohol Abuse Father     Cancer Brother 29        testicular    Diabetes Maternal Uncle     Pancreatic Cancer Maternal Grandmother           SOCIAL HISTORY       Social History     Socioeconomic History    Marital status:      Spouse name: None    Number of children: None    Years of education: None    Highest education level: None   Tobacco Use    Smoking status: Never    Smokeless tobacco: Never   Vaping Use    Vaping Use: Never used   Substance and Sexual Activity    Alcohol use: Yes     Comment: occ    Drug use: Yes     Types: Marijuana (Weed)     Comment: rare    Sexual activity: Not Currently     Partners: Female       SCREENINGS    Christopher Coma Scale  Eye Opening: Spontaneous  Best Verbal Response: Oriented  Best Motor Response: Obeys commands  Cecil Coma Scale Score: 15      PHYSICAL EXAM    (up to 7 forlevel 4, 8 or more for level 5)     ED Triage Vitals [11/14/22 1816]   BP Temp Temp Source Heart Rate Resp SpO2 Height Weight (!) 156/108 98.7 °F (37.1 °C) Tympanic 86 20 96 % 6' (1.829 m) (!) 320 lb (145.2 kg)       Physical Exam  Vitals reviewed. Constitutional:       General: He is not in acute distress. Appearance: He is well-developed. He is obese. He is not diaphoretic. HENT:      Head: Normocephalic and atraumatic. Right Ear: Tympanic membrane, ear canal and external ear normal.      Left Ear: Tympanic membrane, ear canal and external ear normal.      Nose: Nose normal.      Mouth/Throat:      Mouth: Mucous membranes are moist.   Eyes:      Pupils: Pupils are equal, round, and reactive to light. Neck:      Trachea: No tracheal deviation. Cardiovascular:      Rate and Rhythm: Normal rate and regular rhythm. Pulses: Normal pulses. Heart sounds: Normal heart sounds. No murmur heard. Pulmonary:      Effort: Pulmonary effort is normal.      Breath sounds: Normal breath sounds. No stridor. No wheezing. Chest:      Chest wall: No tenderness. Abdominal:      General: Abdomen is flat. Bowel sounds are normal. There is no distension. Palpations: Abdomen is soft. Tenderness: There is no abdominal tenderness. Musculoskeletal:         General: Normal range of motion. Cervical back: Normal range of motion and neck supple. Skin:     General: Skin is warm and dry. Capillary Refill: Capillary refill takes less than 2 seconds. Neurological:      General: No focal deficit present. Mental Status: He is alert and oriented to person, place, and time. Mental status is at baseline. Psychiatric:         Mood and Affect: Mood normal.         Behavior: Behavior normal.         Thought Content:  Thought content normal.         Judgment: Judgment normal.         DIAGNOSTIC RESULTS     RADIOLOGY:   Non-plain film images such as CT, Ultrasound and MRI are read by the radiologist. Plain radiographic images are visualized and preliminarilyinterpreted by Arnoldo Little MD with the below findings:        Interpretation per the Radiologist below, if available at the time of this note:    CT HEAD WO CONTRAST   Final Result       No acute intracranial abnormality is present. No evidence of acute cortical infarction, hemorrhage, mass or mass effect    Recommendation: Follow up as clinically indicated. All CT scans at this facility utilize dose modulation, iterative reconstruction, and/or weight based dosing when appropriate to reduce radiation dose to as low as reasonably achievable. Electronically Signed by Mo Tanner MD at 14-Nov-2022 09:30:43 PM EST                   LABS:  Labs Reviewed   COMPREHENSIVE METABOLIC PANEL - Abnormal; Notable for the following components:       Result Value    ALT 67 (*)     AST 48 (*)     All other components within normal limits   COVID-19, RAPID   CBC WITH AUTO DIFFERENTIAL   ETHANOL   DRUG SCRN, BUPRENORPHINE   URINALYSIS WITH REFLEX TO CULTURE       All other labs were within normal range or notreturned as of this dictation. RE-ASSESSMENT          EMERGENCY DEPARTMENT COURSE and DIFFERENTIAL DIAGNOSIS/MDM:   Vitals:    Vitals:    11/14/22 1950 11/14/22 1957 11/14/22 2110 11/14/22 2130   BP: 134/80  (!) 146/87 (!) 125/91   Pulse: 78 78 90 76   Resp: 18  17 16   Temp: 97.7 °F (36.5 °C)   97.9 °F (36.6 °C)   TempSrc: Oral   Temporal   SpO2: 95%  94% 97%   Weight:    (!) 329 lb (149.2 kg)   Height:               MDM  Plan will be for admission as he is medically cleared I spoke directly Doc to doc with Dr. Thelma Webster agreeable to take over his care for SI. He is voluntary. PROCEDURES:    Procedures      FINAL IMPRESSION      1. Suicide attempt ([facility] [address]    2. Suicidal ideation    3. Depression, unspecified depression type          DISPOSITION/PLAN   DISPOSITION Decision To Admit 11/14/2022 07:36:47 PM      PATIENT REFERRED TO:  No follow-up provider specified.     DISCHARGE MEDICATIONS:  Current Discharge Medication List          (Please note that portions of this note were completed with a voice recognition program.  Efforts were made to edit the dictations but occasionallywords are mis-transcribed.)    Emilie Mead, [address] [place] [address], [address] [place] [address] Betzy  11/15/22 9859

## 2022-11-15 NOTE — PLAN OF CARE
Group Therapy Note     Date: 11/15/2022  Start Time: 8057  End Time:  1600  Number of Participants: 10     Type of Group: Recovery     Wellness Binder Information  Module Name:  emotional wellness  Session Number:  5     Patient's Goal:  obstacles to emotional wellness     Notes:  pt was verbally prompted to attend group. Pt refused. Information about obstacles to wellness was provided. Status After Intervention:       Participation Level:      Participation Quality:         Speech:           Thought Process/Content:         Affective Functioning:         Mood:         Level of consciousness:          Response to Learning:         Endings:      Modes of Intervention:         Discipline Responsible: Psychoeducational Specialist        Signature:  Luis Sandoval

## 2022-11-15 NOTE — ED NOTES
Report called to 6th floor, spoke with wilbur. Security notified for transport.       Flaco Jameson RN  11/14/22 7860

## 2022-11-15 NOTE — PROGRESS NOTES
[facility] Admission Note  Nursing Admission Note        Reason for Admission: Hudson Barron is a 28 y.o. male who presents to the emergency department with history of depression not currently on any medication he denies any thoughts of HI or hallucinations. Has had intermittent thoughts of SI without acting up on for multiple years. Tells me he thought about it today and was actually seen walking out into traffic. An innocent bystander called the police who brought him here. Patient Active Problem List   Diagnosis    Seizure ([facility] [address]    Depression with suicidal ideation         Addictive Behavior:   Addictive Behavior  In the Past 3 Months, Have You Felt or Has Someone Told You That You Have a Problem With  : None    Medical Problems:   Past Medical History:   Diagnosis Date    Obesity     Psoriasis     Seizure ([facility] [address]     Sleep apnea        Status EXAM:  Mental Status and Behavioral Exam  Normal: No  Level of Assistance: Independent/Self  Facial Expression: Avoids Gaze, Sad  Affect: Congruent, Appropriate  Level of Consciousness: Alert  Frequency of Checks: 4 times per hour, close  Mood:Normal: No  Mood: Depressed, Sad  Motor Activity:Normal: Yes  Eye Contact: Good  Observed Behavior: Friendly, Guarded  Sexual Misconduct History: Current - no  Preception: [place] to person, [place] to time, [place] to place, [place] to situation  Attention:Normal: Yes  Thought Processes: Blocking  Thought Content:Normal: Yes  Depression Symptoms: Feelings of hopelessess, Feelings of worthlessness, Change in energy level, Feelings of helplessness, Loss of interest, Isolative  Anxiety Symptoms: Generalized  Elenita Symptoms: No problems reported or observed. Hallucinations: None  Delusions: No  Memory:Normal: Yes  Insight and Judgment: No  Insight and Judgment: Poor judgment    Psych:   Suicidal Ideation: yes. If yes, is there a plan? (Describe) jump in traffic              Risk of Suicide: High Risk   Homicidal Ideation:  no.  If yes, describe: n/a   Auditory/Visual Hallucinations:  no.      If yes, describe AVH? N/a*    Metabolic Screening:  Lab Results   Component Value Date    LABA1C 5.7 02/27/2020     Lab Results   Component Value Date    CHOL 166 02/27/2020     Lab Results   Component Value Date    TRIG 222 (H) 02/27/2020     Lab Results   Component Value Date    HDL 33 (L) 02/27/2020     No components found for: LDLCAL  No results found for: LABVLDL  Body mass index is 44.62 kg/m². BP Readings from Last 2 Encounters:   11/14/22 (!) 125/91   08/22/22 (!) 150/78       PATIENT STRENGTHS and Barriers:   Patient Strengths/Barriers  Strengths (Must Choose Two): Adequate financial resources, Education, Independent living      Tobacco Screening:  Practical Counseling, on admission, leonarda X, if applicable and completed (first 3 are required if patient doesn't refuse):            Recognizing danger situations (included triggers and roadblocks)   n/a              Coping skills (new ways to manage stress, exercise, relaxation techniques, changing routine, distraction  n/a                                                    Basic information about quitting (benefits of quitting, techniques in how to quit, available resources n/a  Referral for counseling faxed to [facility]     n/a                                       Patient refused counseling n/a  Patient has not smoked in the last 30 days yes  Patient offered nicotine patch. Received n/a  Refused n/a  Patient is a non-smoker yes       Admission to Unit:    Pt admitted to [facility] under the care of Dr. Crista Saini,  arrived on unit via [facility] with security and staff from ED    Patient arrived dressed in paper scrubs:  yes. Body assessment and safety check completed by SS, CG and  no contraband discovered. Patient belongings and valuables was cataloged and accounted for by SS.      Admission completed by CG  Oriented to unit, unit policy and expectations:  yes    Reviewed and explained all legal documents:  no    Education for [place] and Restraints given: yes    Patient signed all legal documents no   Pt verbalizes understanding:yes     Ellen Beer Obtained? yes    Medical Bed:  Does patient require a medical bed? no  If answered yes for medical bed use, does the patient have the following conditions? High risk for falls? NA   Obstructive sleep apnea? NA   Oxygen Use? NA   Use of assistive devices? NA   Other, (explain)? N/a      Identifies stressors. yes   relationship, financial, work. Protective Factors:  Patient identifies protective factors with nursing staff as follows: Identifies reasons for living: Yes   Supportive Social Network or family: Yes    Belief that suicide is immoral/high spirituality: Yes   Responsibility to family or others/living with family: Yes   Fear of death or dying due to pain and suffering: Yes   Engaged in work or school: Yes     If Patient is unable to identify, reason why? N/a          Admission Note:    PT does wear a CPAP but does not have it with him. He states he will have it brought in. PT is quiet and hesitates to open up to unfamiliar people. PT  seems eager for help at this encounter. PT states he has been on Lamictal in the past and wants to discuss the possibility of getting back on medication.            Electronically signed by Liz Herbert RN on 11/15/22 at 5:45 AM CST

## 2022-11-15 NOTE — PROGRESS NOTES
Behavioral Services  Medicare Certification Upon Admission    I certify that this patient's inpatient psychiatric hospital admission is medically necessary for:    [x] (1) Treatment which could reasonably be expected to improve this patient's condition,       [x] (2) Or for diagnostic study;     AND     [x](2) The inpatient psychiatric services are provided while the individual is under the care of a physician and are included in the individualized plan of care.     Estimated length of stay/service 3-5 days    Plan for post-hospital care TBA    Electronically signed by Lauren Calles MD on 11/15/2022 at 8:28 AM

## 2022-11-16 PROBLEM — F12.90 CANNABIS USE DISORDER: Status: ACTIVE | Noted: 2022-11-16

## 2022-11-16 PROBLEM — F41.1 GENERALIZED ANXIETY DISORDER: Status: ACTIVE | Noted: 2022-11-16

## 2022-11-16 LAB
CHOLESTEROL, TOTAL: 190 MG/DL (ref 160–199)
HBA1C MFR BLD: 5.5 % (ref 4–6)
HDLC SERPL-MCNC: 32 MG/DL (ref 55–121)
LDL CHOLESTEROL CALCULATED: 105 MG/DL
TRIGL SERPL-MCNC: 267 MG/DL (ref 0–149)
TSH REFLEX FT4: 1 UIU/ML (ref 0.35–5.5)
VITAMIN B-12: 476 PG/ML (ref 211–946)
VITAMIN D 25-HYDROXY: 15.2 NG/ML

## 2022-11-16 PROCEDURE — 83036 HEMOGLOBIN GLYCOSYLATED A1C: CPT

## 2022-11-16 PROCEDURE — 82306 VITAMIN D 25 HYDROXY: CPT

## 2022-11-16 PROCEDURE — 84443 ASSAY THYROID STIM HORMONE: CPT

## 2022-11-16 PROCEDURE — 36415 COLL VENOUS BLD VENIPUNCTURE: CPT

## 2022-11-16 PROCEDURE — 99222 1ST HOSP IP/OBS MODERATE 55: CPT | Performed by: PSYCHIATRY & NEUROLOGY

## 2022-11-16 PROCEDURE — 80061 LIPID PANEL: CPT

## 2022-11-16 PROCEDURE — 1240000000 HC EMOTIONAL WELLNESS R&B

## 2022-11-16 PROCEDURE — 6370000000 HC RX 637 (ALT 250 FOR IP): Performed by: PSYCHIATRY & NEUROLOGY

## 2022-11-16 PROCEDURE — 99233 SBSQ HOSP IP/OBS HIGH 50: CPT | Performed by: PSYCHIATRY & NEUROLOGY

## 2022-11-16 PROCEDURE — 6370000000 HC RX 637 (ALT 250 FOR IP): Performed by: FAMILY MEDICINE

## 2022-11-16 PROCEDURE — 95816 EEG AWAKE AND DROWSY: CPT

## 2022-11-16 PROCEDURE — 82607 VITAMIN B-12: CPT

## 2022-11-16 PROCEDURE — 95819 EEG AWAKE AND ASLEEP: CPT | Performed by: PSYCHIATRY & NEUROLOGY

## 2022-11-16 RX ORDER — LAMOTRIGINE 25 MG/1
25 TABLET ORAL DAILY
Status: DISCONTINUED | OUTPATIENT
Start: 2022-11-16 | End: 2022-11-18 | Stop reason: HOSPADM

## 2022-11-16 RX ORDER — ERGOCALCIFEROL 1.25 MG/1
50000 CAPSULE ORAL WEEKLY
Status: DISCONTINUED | OUTPATIENT
Start: 2022-11-16 | End: 2022-11-18 | Stop reason: HOSPADM

## 2022-11-16 RX ADMIN — TRAZODONE HYDROCHLORIDE 50 MG: 50 TABLET ORAL at 20:51

## 2022-11-16 RX ADMIN — ERGOCALCIFEROL 50000 UNITS: 1.25 CAPSULE ORAL at 13:12

## 2022-11-16 RX ADMIN — ACETAMINOPHEN 650 MG: 325 TABLET ORAL at 14:37

## 2022-11-16 RX ADMIN — ESCITALOPRAM 5 MG: 5 TABLET, FILM COATED ORAL at 09:35

## 2022-11-16 RX ADMIN — LAMOTRIGINE 25 MG: 25 TABLET ORAL at 13:12

## 2022-11-16 RX ADMIN — IBUPROFEN 800 MG: 400 TABLET, FILM COATED ORAL at 23:00

## 2022-11-16 ASSESSMENT — PAIN DESCRIPTION - LOCATION
LOCATION: HEAD
LOCATION: HEAD

## 2022-11-16 ASSESSMENT — PAIN SCALES - GENERAL
PAINLEVEL_OUTOF10: 0
PAINLEVEL_OUTOF10: 7
PAINLEVEL_OUTOF10: 5
PAINLEVEL_OUTOF10: 8

## 2022-11-16 ASSESSMENT — PAIN DESCRIPTION - DESCRIPTORS: DESCRIPTORS: SHARP

## 2022-11-16 ASSESSMENT — PAIN DESCRIPTION - ORIENTATION
ORIENTATION: LEFT;RIGHT
ORIENTATION: ANTERIOR;LEFT

## 2022-11-16 ASSESSMENT — PAIN - FUNCTIONAL ASSESSMENT
PAIN_FUNCTIONAL_ASSESSMENT: ACTIVITIES ARE NOT PREVENTED
PAIN_FUNCTIONAL_ASSESSMENT: ACTIVITIES ARE NOT PREVENTED

## 2022-11-16 NOTE — H&P
HISTORY and PHYSICAL      CHIEF COMPLAINT:  Depression    Reason for Admission:  Depression    History Obtained From:  patient, chart    HISTORY OF PRESENT ILLNESS:      The patient is a 28 y.o. male who is admitted to the [place] [address] unit with worsening mood issues. He has no c/o CP or SOA. He has no abdominal pain or N/V. He has no new pain issues. No fevers. No HA or dizziness. Past Medical History:        Diagnosis Date    Obesity     Psoriasis     Seizure ([address]     Sleep apnea      Past Surgical History:        Procedure Laterality Date    TONSILLECTOMY AND ADENOIDECTOMY      TYMPANOSTOMY TUBE PLACEMENT Bilateral 1998         Medications Prior to Admission:    Medications Prior to Admission: [DISCONTINUED] topiramate (TOPAMAX) 100 MG tablet, Take 100 mg by mouth 2 times daily (Patient not taking: No sig reported)  lamoTRIgine (LAMICTAL) 100 MG tablet, Take 100 mg by mouth daily (Patient not taking: No sig reported)    Allergies:  Keppra [levetiracetam]    Social History:   TOBACCO:   reports that he has never smoked. He has never used smokeless tobacco.  ETOH:   reports current alcohol use. DRUGS:   reports current drug use. Drug: Marijuana [place]).   MARITAL STATUS:    OCCUPATION:  he is working  Patient currently lives alone      Family History:       Problem Relation Age of Onset    High Blood Pressure Father     High Cholesterol Father     Alcohol Abuse Father     Cancer Brother 29        testicular    Diabetes Maternal Uncle     Pancreatic Cancer Maternal Grandmother      REVIEW OF SYSTEMS:  Constitutional: neg  CV: neg  Pulmonary: neg  GI: neg  : neg  Psych: depression  Neuro: neg  Skin: neg  MusculoSkeletal: neg  HEENT: neg  Joints: neg    Vitals:  /87   Pulse 90   Temp 97.9 °F (36.6 °C) (Temporal)   Resp 16   Ht 6' (1.829 m)   Wt (!) 329 lb (149.2 kg)   SpO2 92%   BMI 44.62 kg/m²     PHYSICAL EXAM:  Gen: NAD, alert  HEENT: WNL  Lymph: no LAD  Neck: no JVD or masses  Chest: CTA bilat  CV: RRR  Abdomen: NT/ND  Extrem: no C/C/E  Neuro: non focal  Skin: no rashes  Joints: no redness    DATA:  I have reviewed the admission labs and imaging tests.     ASSESSMENT AND PLAN:      Principal Problem:    Depression with suicidal ideation--follow with Psych    Elevated LFT's        Alize Neri MD  7:03 AM 11/16/2022

## 2022-11-16 NOTE — PROGRESS NOTES
Group Therapy Note    Date:11/15/22  Time:2030    Patient attended and participated in [id number]. Patient filled out wrap up group documentation.     Notes:    Ezequiel PASTOR

## 2022-11-16 NOTE — PROGRESS NOTES
[facility] Adult Unit Daily Assessment  Nursing Progress Note    Room: [address] [place], [place] [address]-   Name: Beverly Jackson   Age: 28 y.o. Gender: male   Dx: Depression with suicidal ideation  Precautions: close watch, suicide risk, fall and seizure precautions  Inpatient Status: voluntary       SLEEP:  Sleep Quality Fair  Sleep Medications: Yes   PRN Sleep Meds: No       MEDICAL:  Other PRN Meds: No  Med Compliant: Yes  Accu-Chek: No  Oxygen/CPAP/BiPAP: No  CIWA/CINA: No   PAIN Assessment: headaches  Side Effects from medication: No      Metabolic Screening:  Lab Results   Component Value Date    LABA1C 5.5 11/16/2022     Lab Results   Component Value Date    CHOL 190 11/16/2022    CHOL 166 02/27/2020     Lab Results   Component Value Date    TRIG 267 (H) 11/16/2022    TRIG 222 (H) 02/27/2020     Lab Results   Component Value Date    HDL 32 (L) 11/16/2022    HDL 33 (L) 02/27/2020     No components found for: LDLCAL  No results found for: LABVLDL  Body mass index is 44.62 kg/m². BP Readings from Last 2 Encounters:   11/16/22 (!) 134/96   08/22/22 (!) 150/78         Medical Bed:   Is patient in a medical bed? yes   If medical bed is in use, has nursing secured room while patient is awake and out of the room? yes  Has safety checks by nursing been completed on the bed/room this shift? yes    Protective Factors:  Patient identifies protective factors with nursing staff as follows: Identifies reasons for living: Yes   Supportive Social Network or family: Yes    Belief that suicide is immoral/high spirituality: Yes   Responsibility to family or others/living with family: Yes   Fear of death or dying due to pain and suffering: Yes   Engaged in work or school: Yes     If Patient is unable to identify, reason why? PSYCH:  Depression: 4-5   Anxiety: 4-5   SI denies suicidal ideation   Risk of Suicide: No Risk  HI Negative for homicidal ideation      AVH:no If Hallucinations are present, describe?          GENERAL:  Appetite: good   Percent Meals: 100%   Social: No   Speech: normal   Appearance: appropriately dressed and healthy looking    GROUP:  Group Participation: No  Participation Quality: Minimal    Notes:   Pt is calm, cooperative. Med compliant, attended morning group but missed social work morning group. Pt reports feeling groggy this morning from night time medications. Eats well, hasn't been out of room much and is napping due to drowsiness. Neurology consult placed and EEG, both performed today. Rates depression and anxiety 4-5, denies SI, HI, AVH.       Electronically signed by Omar Gaspar RN on 11/16/22 at 12:08 PM CST

## 2022-11-16 NOTE — PROGRESS NOTES
Collateral obtained from: patients angi [facility] [phone]    Immediate Stressors & Time Episode Began: patient has a history of depression and he has not been taking his medication as directed. Patient has been under a lot of stress due to his job and he has a car trouble. Patient called the police and they found his walking on [address]. Patient works at [employer]. Patient has a seizure disorder and he also has Crohn's disease. Patient has two children with his ex wife    Diagnosis/Hx of compliance with meds: not taking medication    Tx Hx/Past hospitalizations:  caller reports no ela treatment history    Family hx of psychiatric issues: caller reports no family history of psychiatric issues    Substance Abuse: caller reports no substance abuse history    Pending Legal: caller reports pending legal issues -- pending issues includes no issues    Safety Issues (Weapons? Hx of attempts): The importance of locking weapons in a secured location was explained and recommended to collateral caller. Patient is a gun  and patient also collects knives    Support system/Medication Managed by: The importance of medication management and locking extra medication in a secured location was explained and reccommended to collateral caller. Discharge Disposition: home -lives alone. Patients will stay with his mom after his discharge.      Additional Info:

## 2022-11-16 NOTE — PROCEDURES
ADULT INPATIENT ELECTROENCEPHALOGRAM REPORT    Patient:   Edin Darden  MR#:    [id number]  Room #:    INPATIENT  YOB: 1990  Date of Evaluation:  11/16/2022  Primary Physician:     Lalo Cramer MD   Referring Physician:   Kali Thomas DO      CLINICAL INFORMATION:     This patient is a 28 y.o. male with a history of seizure disorder. MEDICATIONS:     See MAR. RECORDING CONDITIONS:     This EEG was performed utilizing standard International 10-20 System of electrode placement, with additional channels monitored for eye movement. One channel electrocardiogram was monitored. Data was obtained, stored, and interpreted according to ACNS guidelines (J Clin Neurophysiol 2006;23(2):) utilizing referential montage recording, with reformatting to longitudinal, transverse bipolar, and referential montages as necessary for interpretation, along with the digital/automated EEG analysis. Patient tolerated entire procedure well. Photic stimulation and hyperventilation were utilized as activation procedures unless otherwise specified below. E.E.G. DESCRIPTION:     The resting predominant posterior background frequency is a 8-9 Hz 30-40 uV rhythm. No overt focal, lateralizing, or paroxysmal abnormalities were noted through the recording. Intermixed sleep architecture was noted. Hyperventilation was not performed. Photic stimulation was performed and had little change on the recording. Muscle, motion, and eye movement artifacts were noted. Significant electrode artifact was noted over the left temporal and central regions. EEG INTERPRETATION:    Normal EEG for age in the awake, drowsy, and sleep states. Significant electrode artifact was noted over the left temporal and central regions limiting the study. CLINICAL CORRELATION:     The absence of epileptiform abnormalities does not preclude a clinical diagnosis of seizures.        Kali TenoriodayDO  Board Certified Neurologist      Date reported: 11/16/2022  Date signed: 11/16/2022

## 2022-11-16 NOTE — PROGRESS NOTES
Group Therapy Note    Start Time: 781  End Time:  900  Number of Participants: 15    Type of Group: Community Meeting       Patient's Goal:  sorting through problem solving to manage my problems and anxiety      Notes:      Participation Level:  Active Listener       Participation Quality: Appropriate      Thought Process/Content: Logical      Affective Functioning: Congruent      Mood:  calm      Level of consciousness:  Alert      Modes of Intervention: Support      Discipline Responsible: Behavioral Health Tech II      Signature:  Salvatore Vences

## 2022-11-16 NOTE — PROGRESS NOTES
Department of Psychiatry  Attending Progress Note     Chief complaint: \"Better today\"    SUBJECTIVE:   Chart reviewed, discussed with the team. No major issues overnight. Patient is med-compliant. No SEs. Performs ADLs. Social and goes to groups. Patient seen by the nursing desk this morning. Brighter affect. Denies SI/HI/AVH. Rates depression 5/10, anxiety 4/10. Slept 6h. Denies physical complaints. States he is doing better overall. \"I needed help. I am glad I am here. There are things that I need to work on. \"  We processed his feelings. Supportive therapy provided. OBJECTIVE    Physical  Wt Readings from Last 3 Encounters:   11/14/22 (!) 329 lb (149.2 kg)   08/22/22 (!) 305 lb (138.3 kg)   05/05/22 (!) 310 lb (140.6 kg)     Temp Readings from Last 3 Encounters:   11/15/22 97.9 °F (36.6 °C) (Temporal)   08/22/22 97.7 °F (36.5 °C)   05/05/22 98.6 °F (37 °C) (Oral)     BP Readings from Last 3 Encounters:   11/15/22 121/87   08/22/22 (!) 150/78   05/05/22 (!) 151/103     Pulse Readings from Last 3 Encounters:   11/15/22 90   08/22/22 88   05/05/22 80        Review of Systems: 14-point review of systems negative except as described above    Mental Status Examination:   Appearance:  Stated age. Gait stable. No abnormal movements or tremor. Behavior: Calm, cooperative  Speech: Normal in tone, volume, and quality. No slurring, dysarthria or pressured speech noted. Mood: \"Better\"   Affect: Mood congruent. Thought Process: Appears linear. Thought Content: Denies SI/HI . No overt delusions or paranoia appreciated. Perceptions: Denies auditory or visual hallucinations at present time. Not responding to internal stimuli. Concentration: Intact. Orientation: to person, place, date, and situation. Language: Intact. Fund of information: Intact. Memory: Recent and remote appear intact. Neurovegitative: Fair appetite and improved sleep. Insight: Improving. Judgment: Improving.     Data  Lab Results   Component Value Date    WBC 10.8 11/14/2022    HGB 16.0 11/14/2022    HCT 47.3 11/14/2022    MCV 87.1 11/14/2022     11/14/2022      Lab Results   Component Value Date     11/14/2022    K 3.9 11/14/2022     11/14/2022    CO2 26 11/14/2022    BUN 13 11/14/2022    CREATININE 0.9 11/14/2022    GLUCOSE 83 11/14/2022    CALCIUM 9.4 11/14/2022    PROT 7.0 11/14/2022    LABALBU 4.7 11/14/2022    BILITOT 0.5 11/14/2022    ALKPHOS 72 11/14/2022    AST 48 (H) 11/14/2022    ALT 67 (H) 11/14/2022    LABGLOM >60 11/14/2022    GFRAA >59 05/05/2022    GLOB 2.4 12/04/2016       Medications    Current Facility-Administered Medications:     lamoTRIgine (LAMICTAL) tablet 25 mg, 25 mg, Oral, Daily, Chasity Felix,     ibuprofen (ADVIL;MOTRIN) tablet 800 mg, 800 mg, Oral, Q6H PRN, Ricardo Sharif MD, 800 mg at 11/15/22 1055    escitalopram (LEXAPRO) tablet 5 mg, 5 mg, Oral, Daily, Ricardo Sharif MD, 5 mg at 11/16/22 0935    acetaminophen (TYLENOL) tablet 650 mg, 650 mg, Oral, Q4H PRN, Ricardo Sharif MD    polyethylene glycol (GLYCOLAX) packet 17 g, 17 g, Oral, Daily PRN, Ricardo Sharif MD    traZODone (DESYREL) tablet 50 mg, 50 mg, Oral, Nightly PRN, Ricardo Sharif MD, 50 mg at 11/15/22 2157    hydrOXYzine HCl (ATARAX) tablet 25 mg, 25 mg, Oral, TID PRN, Ricardo Sharif MD, 25 mg at 11/15/22 2157    ASSESSMENT AND PLAN  DSM 5 DIAGNOSIS  Impression  Major depressive disorder, recurrent, severe, without psychotic features  Generalized anxiety disorder  Cannabis use  Seizure disorder  Obesity  History of sleep apnea  Psoriasis  Headache    Continue to observe. Engage in group sessions and recreational activities. Plan:   1. Psychiatric Medications:   Continue current psychotropic medications as recommended. Monitor for side effects. The risks, benefits, side effects, indications, contraindications, alternatives and adverse effects of the medications have been discussed with patient.     2.

## 2022-11-16 NOTE — PLAN OF CARE
Group Therapy Note    Date: 11/16/2022  Start Time: 2481  End Time:  1600  Number of Participants: 14    Type of Group: Recovery    Wellness Binder Information  Module Name:  emotional wellness  Session Number:  5    Patient's Goal:  obstacles to emotional wellness    Notes:  pt was verbally prompted to attend group. Pt refused. Information about obstacles to wellness was provided. Status After Intervention:      Participation Level:     Participation Quality:       Speech:         Thought Process/Content:       Affective Functioning:       Mood:       Level of consciousness:        Response to Learning:       Endings:     Modes of Intervention:       Discipline Responsible: Psychoeducational Specialist      Signature:  Lovely Leyden

## 2022-11-16 NOTE — PROGRESS NOTES
Treatment Team Note:    Target Symptoms/Reason for admission: Per nursing admission assessment - Reason for Admission: Abby Thompson is a 28 y.o. male who presents to the emergency department with history of depression not currently on any medication he denies any thoughts of HI or hallucinations. Has had intermittent thoughts of SI without acting up on for multiple years. Tells me he thought about it today and was actually seen walking out into traffic. An innocent bystander called the police who brought him here. Diagnoses per psych provider: Suicidal ideation [R45.851]  Suicide attempt ([facility] [address] Madeleine Fisher  Depression, unspecified depression type [F32. A]  Depression with suicidal ideation [F32. A, R45.851]  Major depressive disorder, recurrent severe without psychotic features ([facility] [address] [F33.2]    Therapist met with treatment team to discuss patients treatment and discharge plans. Patient's aftercare plan is: [address]    Aftercare appointments made: yes    Pt lives with: alone    Collateral obtained from: mom  Collateral obtained on:11/16/22    Attending groups: Yes    Behavior: cooperative, social with peers/staff, affect brighter, reports his depression/anxiety have improved. Has patient been completing ADL's:  Yes    SI:  patient denies SI  Plan: no   If yes describe: N/A - patient denies plan  HI:  patient denies HI  If present describe: N/A  Delusions: patient denies delusions  If present describe: N/A  Hallucinations: patient denies hallucinations  If present describe: N/A    Patient rates their -- Depression: 1-10:  3  Anxiety:1-10:  3    Sleeping: Fair    Taking medication: Yes    Misc:Tentative discharge Thursday/Friday.

## 2022-11-16 NOTE — PLAN OF CARE
Problem: Self Harm/Suicidality  Goal: Will have no self-injury during hospital stay  Description: INTERVENTIONS:  1. Q 30 MINUTES: Routine safety checks  2. Q SHIFT & PRN: Assess risk to determine if routine checks are adequate to maintain patient safety  11/16/2022 0140 by Graciela Linda RN  Outcome: Progressing  11/16/2022 0137 by Graciela Linda RN  Outcome: Progressing  11/15/2022 1143 by Sammi Polk RN  Outcome: Progressing     Problem: Pain  Goal: Verbalizes/displays adequate comfort level or baseline comfort level  11/16/2022 0140 by Graciela Linda RN  Outcome: Progressing  11/16/2022 0137 by Graciela Linda RN  Outcome: Progressing  11/15/2022 1143 by Sammi Polk RN  Outcome: Progressing     Problem: Depression  Goal: Will be euthymic at discharge  Description: INTERVENTIONS:  1. Administer medication as ordered  2. Provide emotional support via 1:1 interaction with staff  3. Encourage involvement in milieu/groups/activities  4. Monitor for social isolation  Outcome: Progressing     Problem: Anxiety  Goal: Will report anxiety at manageable levels  Description: INTERVENTIONS:  1. Administer medication as ordered  2. Teach and rehearse alternative coping skills  3. Provide emotional support with 1:1 interaction with staff  Outcome: Progressing     Problem: Sleep Disturbance  Goal: Will exhibit normal sleeping pattern  Description: INTERVENTIONS:  1. Administer medication as ordered  2. Decrease environmental stimuli, including noise, as appropriate  3.  Discourage social isolation and naps during the day  Outcome: Progressing     Problem: Neurosensory - Adult  Goal: Achieves stable or improved neurological status  Outcome: Progressing  Goal: Absence of seizures  Outcome: Progressing  Goal: Remains free of injury related to seizures activity  Outcome: Progressing  Goal: Achieves maximal functionality and self care  Outcome: Progressing

## 2022-11-16 NOTE — PROGRESS NOTES
Pt was in group with Chaplain Mckeon in Group Room A. I was in room 611 with a new admission. I was summoned to the group room for a patient emergency. Upon arrival Jair Hardin was sitting in his chair with his eyes opened and making a snoring noise. No movement noted, no drooling. Pt came around and states he felt an aura before it happened. PT had no loss of bladder or bowel, slight unsteady on his feet as we assisted him back in room, ALOx4. Pt c/o headache this morning but refused Tylenol. However, after the incident, patient asked if he could have something for his headache. Natha Shone on unit and aware of situation and spoke with patient. Will continue to monitor for safety and behaviors.

## 2022-11-16 NOTE — CONSULTS
[facility] Neurology Consult      Patient:   Oneil Chamberlain  MR#:    [id number]  Account Number:                   [id number]      Room:    [facility]/612-   YOB: 1990  Date of Progress Note: 11/16/2022  Time of Note                           9:10 AM  Attending Physician:  Latanya Howell MD  Consulting Physician:  Marcus Shipman DO       CHIEF COMPLAINT:  Seizure disorder     HISTORY OF PRESENT ILLNESS:   This is a 28 y.o. male who was admitted with worsening depression. He is currently admitted to our behavioral health unit. He has underlying history of seizures and obstructive sleep apnea. He is followed by Dr. Luke Esqueda as an outpatient. He has been tried on multiple medications in the past including Keppra and Dilantin. Most recently was placed on Topamax. The patient has not been taking antiepileptic medications now though for quite some time. His seizure started around 15years old. He describes generalized tonic-clonic events as well as briefer episodes with staring and behavioral arrest with and without a loss of awareness. Prior MRI and EEG here was normal.  His last event was a week or so ago, brief event, no GTC events in quite sometime. REVIEW OF SYSTEMS:  Constitutional - No fever or chills. HENT -  No Scalp tenderness. No tinnitus or significant hearing loss. No nose bleeding, no sore throat. Eyes - No sudden vision change or eye pain  Respiratory - No significant shortness of breath or cough  Cardiovascular - No chest pain. No palpitations or significant leg swelling  Gastrointestinal - No abdominal swelling or pain. Genitourinary - No difficulty urinating, dysuria  Musculoskeletal - No back pain or myalgia. Skin - No color change or rash  Neurologic - No lateralizing weakness. No numbness. Hematologic - No easy bruising or spontaneous bleeding. Psychiatric - No anxiety.      PAST MEDICAL HISTORY:      Diagnosis Date    Obesity     Psoriasis     Seizure ([facility] [address] Sleep apnea        PAST SURGICAL HISTORY:      Procedure Laterality Date    TONSILLECTOMY AND ADENOIDECTOMY      TYMPANOSTOMY TUBE PLACEMENT Bilateral 1998       SOCIAL HISTORY:   TOBACCO:   reports that he has never smoked. He has never used smokeless tobacco.  ETOH:   reports current alcohol use.   DRUG:    Social History     Substance and Sexual Activity   Drug Use Yes    Types: Marijuana Karley Cotton)    Comment: rare       FAMILY HISTORY:       Problem Relation Age of Onset    High Blood Pressure Father     High Cholesterol Father     Alcohol Abuse Father     Cancer Brother 29        testicular    Diabetes Maternal Uncle     Pancreatic Cancer Maternal Grandmother        MEDICATIONS:      Current Facility-Administered Medications:     ibuprofen (ADVIL;MOTRIN) tablet 800 mg, 800 mg, Oral, Q6H PRN, Cassie Rob MD, 800 mg at 11/15/22 1055    escitalopram (LEXAPRO) tablet 5 mg, 5 mg, Oral, Daily, Cassie Rob MD, 5 mg at 11/15/22 1056    acetaminophen (TYLENOL) tablet 650 mg, 650 mg, Oral, Q4H PRN, Cassie Rob MD    polyethylene glycol (GLYCOLAX) packet 17 g, 17 g, Oral, Daily PRN, Cassie Rob MD    traZODone (DESYREL) tablet 50 mg, 50 mg, Oral, Nightly PRN, Cassie Rob MD, 50 mg at 11/15/22 2157    hydrOXYzine HCl (ATARAX) tablet 25 mg, 25 mg, Oral, TID PRN, Cassie Rob MD, 25 mg at 11/15/22 2157    ALLERGIES:    Keppra [levetiracetam]    PHYSICAL EXAM:    Constitutional -   /87   Pulse 90   Temp 97.9 °F (36.6 °C) (Temporal)   Resp 16   Ht 6' (1.829 m)   Wt (!) 329 lb (149.2 kg)   SpO2 92%   BMI 44.62 kg/m²   General appearance: No acute distress   EYES -   Conjunctiva normal  Pupillary exam as below, see CN exam in the neurologic exam  ENT-    No scars, masses, or lesions over external nose or ears  Oropharynx without erythema, palate midline  Cardiovascular -   No clubbing, cyanosis, or edema   Pulmonary-   Good expansion, normal effort without use of accessory muscles  Musculoskeletal -   No significant wasting of muscles noted  Gait as below, see gait exam in the neurologic exam  Muscle strength, tone, stability as below see the motor exam in the neurologic exam.   No bony deformities  Skin -   Warm, dry, and intact to inspection and palpation. No rash, erythema, or pallor  Psychiatric -   Mood, affect, and behavior appear normal    Memory as below see mental status examination in the neurologic exam      NEUROLOGICAL EXAM    Mental status   [x] Awake, alert, oriented   [x] Affect attention and concentration appear appropriate  [x] Recent and remote memory appears unremarkable  [x] Speech normal without dysarthria or aphasia, comprehension and repetition intact.    COMMENTS:   Cranial Nerves [x] No VF deficit to confrontation,  optic discs normal, no papilledema on fundoscopic exam.  [x] PERRLA, EOMI, no nystagmus, conjugate eye movements, no ptosis  [x] Face symmetric  [x] Facial sensation intact  [x] Tongue midline no atrophy or fasciculations present  [x] Palate midline, hearing to finger rub normal  [x] Shoulder shrug and SCM testing normal  COMMENTS:   Motor   [x] 5/5 strength x 4 extremities  [x] Normal bulk and tone  [x] No tremor present  [x] No rigidity or bradykinesia noted  COMMENTS:   Sensory  [x] Sensation intact to light touch, pin prick, vibration, and proprioception BLE  [x] Sensation intact to light touch, pin prick, vibration, and proprioception BUE  COMMENTS:   Coordination [x] FTN normal bilaterally   [x] HTS normal bilaterally  [x] MARCELO normal.   COMMENTS:   Reflexes  [x] Symmetric and non-pathological  [x] Toes downgoing bilaterally  [x] No clonus present  COMMENTS:   Gait                  [x] Normal steady gait    [] Ataxic    [] Spastic     [] Magnetic     [] Shuffling  [] Not assessed  COMMENTS:       LABS/IMAGING:    As below and per HPI    CT HEAD WO CONTRAST    Result Date: 11/14/2022  NO PRIOR REPORT AVAILABLE Exam: CT OF THE BRAIN WITHOUT CONTRAST Clinical data: Headache, uncontrolled hypertension. Technique: Contiguous axial images are obtained from the skull base to vertex without intravenous contrast.  Reformatted/MPR images were performed. Radiation dose: CTDIvol = 40.05 mGy, DLP = 760.14 mGy x cm. Prior studies: No prior studies submitted. Findings:  No acute intracranial abnormality is present. No evidence of acute cortical infarction, hemorrhage, mass or mass effect. No hydrocephalus or abnormal extra-axial fluid collections are present. The posterior fossa is unremarkable. The skull base and calvarium are intact. The included portions of the paranasal sinuses and mastoid air cells are clear. No acute intracranial abnormality is present. No evidence of acute cortical infarction, hemorrhage, mass or mass effect Recommendation: Follow up as clinically indicated. All CT scans at this facility utilize dose modulation, iterative reconstruction, and/or weight based dosing when appropriate to reduce radiation dose to as low as reasonably achievable. Electronically Signed by Alina Garzon MD at 14-Nov-2022 09:30:43 PM EST               Recent Labs     11/14/22 2015   WBC 10.8   HGB 16.0        Recent Labs     11/14/22 2015      K 3.9      CO2 26   BUN 13   CREATININE 0.9   GLUCOSE 83     Recent Labs     11/14/22 2015   AST 48*   ALT 67*   BILITOT 0.5   ALKPHOS 72     Recent Labs     11/16/22  0619   CHOL 190   HDL 32*       ASSESSMENT:  28 y.o. admitted with worsening depression and suicidal ideation currently on our behavioral health unit. He has an underlying history of seizure disorder and is followed by Dr. Everett Vázquez. He is currently off antiepileptic medications. Prior MRI and EEG here was normal.  His last event was a week or so ago. He has been on Keppra, Dilantin and most recently Topamax. PLAN:  Will add Lamictal and titrate slowly over the next few months to 100 mg bid.    Will cover from a seizure standpoint and help with mood stabilization. Repeat MRI felt low yield. Will repeat EEG  Seizure precautions. Epilepsy precautions and seizure first aid discussed. No driving, heights, swimming, tub baths, open flames, or heavy machinery. Please feel free to call with any questions. [phone] (cell phone).     Juarez Lomeli DO  Board Certified Neurology

## 2022-11-16 NOTE — PROGRESS NOTES
Pt at 65 pt awaken and questioned staff nurse medications of day shift for seizure disorder. Sent message to Dr Kadie Calderón to address seizure medications. Awaiting call back of review of medications. At 2330 Dr Kadie Calderón message:    can resume medicine that were verified as home meds as long as ok by Psychiatry     Medications need to be verified in AM. At [address] [facility]note 2[facility] when pharmacy is available and medications verified. Will notify day shift nurse in AM. 11/16. At 0700, 11/16/22, notified, day nurses in report of information given to verify medication this morning and contact MD for further medication needed.

## 2022-11-16 NOTE — PLAN OF CARE
Problem: Self Harm/Suicidality  Goal: Will have no self-injury during hospital stay  Description: INTERVENTIONS:  1. Q 30 MINUTES: Routine safety checks  2. Q SHIFT & PRN: Assess risk to determine if routine checks are adequate to maintain patient safety  11/16/2022 1140 by Adali Gomez RN  Outcome: Progressing  11/16/2022 0140 by Marcus Castro RN  Outcome: Progressing  11/16/2022 0137 by Marcus Castro RN  Outcome: Progressing     Problem: Pain  Goal: Verbalizes/displays adequate comfort level or baseline comfort level  11/16/2022 1140 by Adali Gomez RN  Outcome: Progressing  11/16/2022 0140 by Marcus Castro RN  Outcome: Progressing  11/16/2022 0137 by Marcus Castro RN  Outcome: Progressing     Problem: Depression  Goal: Will be euthymic at discharge  Description: INTERVENTIONS:  1. Administer medication as ordered  2. Provide emotional support via 1:1 interaction with staff  3. Encourage involvement in milieu/groups/activities  4. Monitor for social isolation  11/16/2022 1140 by Adali Gomez RN  Outcome: Progressing  11/16/2022 0140 by Marcus Castro RN  Outcome: Progressing     Problem: Anxiety  Goal: Will report anxiety at manageable levels  Description: INTERVENTIONS:  1. Administer medication as ordered  2. Teach and rehearse alternative coping skills  3. Provide emotional support with 1:1 interaction with staff  11/16/2022 1140 by Adali Gomez RN  Outcome: Progressing  Flowsheets (Taken 11/16/2022 1110)  Will report anxiety at manageable levels:   Administer medication as ordered   Provide emotional support with 1:1 interaction with staff   Teach and rehearse alternative coping skills  11/16/2022 0140 by Marcus Castro RN  Outcome: Progressing     Problem: Sleep Disturbance  Goal: Will exhibit normal sleeping pattern  Description: INTERVENTIONS:  1. Administer medication as ordered  2. Decrease environmental stimuli, including noise, as appropriate  3.  Discourage social isolation and naps during the day  11/16/2022 1140 by Liudmila Hargrove RN  Outcome: Progressing  11/16/2022 0140 by Haris Mcnair RN  Outcome: Progressing     Problem: Neurosensory - Adult  Goal: Achieves stable or improved neurological status  11/16/2022 1140 by Liudmila Hargrove RN  Outcome: Progressing  Flowsheets (Taken 11/16/2022 1110)  Achieves stable or improved neurological status: Assess for and report changes in neurological status  11/16/2022 0140 by Haris Mcnair RN  Outcome: Progressing  Goal: Absence of seizures  11/16/2022 1140 by Liudmila Hargrove RN  Outcome: Progressing  Flowsheets (Taken 11/16/2022 1110)  Absence of seizures:   Monitor for seizure activity.   If seizure occurs, document type and location of movements and any associated apnea   If seizure occurs, turn head to side and suction secretions as needed   Administer anticonvulsants as ordered  11/16/2022 0140 by Haris Mcnair RN  Outcome: Progressing  Goal: Remains free of injury related to seizures activity  11/16/2022 1140 by Liudmila Hargrove RN  Outcome: Progressing  Flowsheets (Taken 11/16/2022 1110)  Remains free of injury related to seizure activity:   Maintain airway, patient safety  and administer oxygen as ordered   Monitor patient for seizure activity, document and report duration and description of seizure to Licensed Independent Practitioner   If seizure occurs, turn patient to side and suction secretions as needed  11/16/2022 0140 by Haris Mcnair RN  Outcome: Progressing  Goal: Achieves maximal functionality and self care  11/16/2022 1140 by Liudmila Hargrove RN  Outcome: Progressing  Flowsheets (Taken 11/16/2022 1110)  Achieves maximal functionality and self care: Monitor swallowing and airway patency with patient fatigue and changes in neurological status  11/16/2022 0140 by Haris Mcnair RN  Outcome: Progressing

## 2022-11-16 NOTE — PLAN OF CARE
Group Therapy Note    Date: 11/16/2022  Start Time: 1000  End Time:  1030  Number of Participants: 9    Type of Group: Psychoeducation    Wellness Binder Information  Module Name:  Women's Issues  Session Number:  4    Group Goal for Pt: To raise awareness of how thoughts influence feelings    Notes:  Pt did not attend group discussion. Pt was invited/encouraged. Status After Intervention:      Participation Level:     Participation Quality:       Speech:         Thought Process/Content:       Affective Functioning:       Mood:       Level of consciousness:        Response to Learning:       Endings:     Modes of Intervention:       Discipline Responsible:       Signature:  Mel Miller

## 2022-11-16 NOTE — PSYCHOTHERAPY
Group Therapy Note    Date: 11/16/2022  Start Time: 1330  End Time:  1400  Number of Participants: 12    Type of Group: SPIRITUALITY     Wellness Binder Information  Module Name:  Mindfulness  Session Number:      Patient's Goal:  To rest the mind. .. Notes:      Status After Intervention:  Improved    Participation Level:  Active Listener and Interactive    Participation Quality: Appropriate, Attentive, Sharing, and Supportive      Speech:  normal      Thought Process/Content:       Affective Functioning: Congruent      Mood: calm      Level of consciousness:  Oriented x4      Response to Learning: Able to verbalize current knowledge/experience, Able to verbalize/acknowledge new learning, and Capable of insight      Endings:     Modes of Intervention: Education, Support, and Activity      Discipline Responsible:       Signature:  Emily Davis MA [facility]

## 2022-11-16 NOTE — PLAN OF CARE
Group Therapy Note    Date: 11/16/2022  Start Time: 1100  End Time:  1130  Number of Participants: 12    Type of Group: Psychotherapy    Wellness Binder Information  Module Name:  emotional wellness  Session Number:  1    Patient's Goal:  validation of feelings    Notes:  pt was verbally prompted to attend group. Pt refused. Information about emotional wellness was provided. Status After Intervention:      Participation Level:     Participation Quality:       Speech:         Thought Process/Content:       Affective Functioning:       Mood:       Level of consciousness:        Response to Learning:       Endings:     Modes of Intervention:       Discipline Responsible: Psychoeducational Specialist      Signature:  Manisha Braden

## 2022-11-16 NOTE — PROGRESS NOTES
[facility] Adult Unit Daily Assessment  Nursing Progress Note    Room: [facility]612-01   Name: Stephany Mcintosh   Age: 28 y.o. Gender: male   Dx: Depression with suicidal ideation  Precautions: close watch, suicide risk, and seizure precautions  Inpatient Status: voluntary       SLEEP:  Sleep Quality Fair  Sleep Medications: Yes and Trazodone 50 mg   PRN Sleep Meds: Yes       MEDICAL:  Other PRN Meds: Yes and Atarax 25 mg   Med Compliant: Yes  Accu-Chek: No  Oxygen/CPAP/BiPAP: Yes and pt did not bring Cpap from home, pt moved to room close to nurse station. Pt wanting home cpap   CIWA/CINA: No   PAIN Assessment: none  Side Effects from medication: No      Metabolic Screening:  Lab Results   Component Value Date    LABA1C 5.7 02/27/2020     Lab Results   Component Value Date    CHOL 166 02/27/2020     Lab Results   Component Value Date    TRIG 222 (H) 02/27/2020     Lab Results   Component Value Date    HDL 33 (L) 02/27/2020     No components found for: LDLCAL  No results found for: LABVLDL  Body mass index is 44.62 kg/m². BP Readings from Last 2 Encounters:   11/15/22 121/87   08/22/22 (!) 150/78         Medical Bed:   Is patient in a medical bed? no   If medical bed is in use, has nursing secured room while patient is awake and out of the room? NA  Has safety checks by nursing been completed on the bed/room this shift? NA    Protective Factors:  Patient identifies protective factors with nursing staff as follows:    Identifies reasons for living: Yes   Supportive Social Network or family: Yes    Belief that suicide is immoral/high spirituality: yes   Responsibility to family or others/living with family: Yes   Fear of death or dying due to pain and suffering: Yes   Engaged in work or school: Yes     If Patient is unable to identify, reason why? na      PSYCH:  Depression: 3   Anxiety: 3   SI denies suicidal ideation   Risk of Suicide: No Risk  HI Negative for homicidal ideation      AVH:no If Hallucinations are present, describe? na        GENERAL:  Appetite: good   Percent Meals: no meals this shift, pt eating throughout the day   Social: Yes   Speech: normal   Appearance: appropriately dressed and healthy looking    GROUP:  Group Participation: Yes  Participation Quality: Active Listener and Interactive    Notes: pt in day area and TV area all evening, pt social, interactive with peers, played cards with pees, pt stated that \"playing cards relaxes him\" pt stated that depression and anxiety improved and rated 3 for anxiety and depression this shift. Pt appetite is good, participated in group, and activities, affect brighter, pt is cooperative and pleasant with pees and staff. Pt denies SI,HI and AVH, pt stated that he does not know why he did what he did [place] and pt stated that he has had issues with SI thoughts interm for a long time. Pt is medication compliant and performs ADL's. Pt has cpap and pt moved to 612, close to nurse station and pt did not have his cpap from home. Pt stated that he was only suppose to stay on unit for one day and go home and he did not belong and that he did no have any clothing or belongings, and pt stated that is wanting to go home soon. Will continue to monitor for safety and comfort.          Electronically signed by Rina Cox RN on 11/16/22 at 1:44 AM CST

## 2022-11-17 PROCEDURE — 1240000000 HC EMOTIONAL WELLNESS R&B

## 2022-11-17 PROCEDURE — 99232 SBSQ HOSP IP/OBS MODERATE 35: CPT | Performed by: PSYCHIATRY & NEUROLOGY

## 2022-11-17 PROCEDURE — 6370000000 HC RX 637 (ALT 250 FOR IP): Performed by: PSYCHIATRY & NEUROLOGY

## 2022-11-17 PROCEDURE — 99233 SBSQ HOSP IP/OBS HIGH 50: CPT | Performed by: PSYCHIATRY & NEUROLOGY

## 2022-11-17 RX ADMIN — ESCITALOPRAM 5 MG: 5 TABLET, FILM COATED ORAL at 08:28

## 2022-11-17 RX ADMIN — LAMOTRIGINE 25 MG: 25 TABLET ORAL at 08:28

## 2022-11-17 RX ADMIN — TRAZODONE HYDROCHLORIDE 50 MG: 50 TABLET ORAL at 21:01

## 2022-11-17 NOTE — PROGRESS NOTES
[facility] Adult Unit Daily Assessment  Nursing Progress Note    Room: [facility]612-01   Name: Joseline Mccall   Age: 28 y.o. Gender: male   Dx: Depression with suicidal ideation  Precautions: suicide risk and seizure precautions  Inpatient Status: voluntary       SLEEP:  Sleep Quality Fair  Sleep Medications: Yes   PRN Sleep Meds: No       MEDICAL:  Other PRN Meds: Yes   Med Compliant: Yes  Accu-Chek: No  Oxygen/CPAP/BiPAP: No  CIWA/CINA: No   PAIN Assessment: headaches  Side Effects from medication: No      Metabolic Screening:  Lab Results   Component Value Date    LABA1C 5.5 11/16/2022     Lab Results   Component Value Date    CHOL 190 11/16/2022    CHOL 166 02/27/2020     Lab Results   Component Value Date    TRIG 267 (H) 11/16/2022    TRIG 222 (H) 02/27/2020     Lab Results   Component Value Date    HDL 32 (L) 11/16/2022    HDL 33 (L) 02/27/2020     No components found for: LDLCAL  No results found for: LABVLDL  Body mass index is 44.62 kg/m². BP Readings from Last 2 Encounters:   11/16/22 (!) 123/92   08/22/22 (!) 150/78         Medical Bed:   Is patient in a medical bed? yes   If medical bed is in use, has nursing secured room while patient is awake and out of the room? yes  Has safety checks by nursing been completed on the bed/room this shift? yes    Protective Factors:  Patient identifies protective factors with nursing staff as follows: Identifies reasons for living: Yes   Supportive Social Network or family: Yes    Belief that suicide is immoral/high spirituality: Yes   Responsibility to family or others/living with family: Yes   Fear of death or dying due to pain and suffering: Yes   Engaged in work or school: Yes     If Patient is unable to identify, reason why? N/A      PSYCH:  Depression: 3   Anxiety: 3   SI denies suicidal ideation   Risk of Suicide: No Risk  HI Negative for homicidal ideation      AVH:no If Hallucinations are present, describe?  N/A        GENERAL:  Appetite: good   Percent Meals: Social: Yes   Speech: normal   Appearance: appropriately dressed    GROUP:  Group Participation: Yes  Participation Quality: Minimal    Notes:     Patient was friendly and social with staff and peers. Patient was cooperative during assessment. Patient performed ADLs this evening. Patient reported that he did not want to take atarax because it made him \"groggy\" this morning. Patient did not want to use a CPAP machine this evening. Patient later got up and reported having a headache. Tylenol was given for pain 8/10. Patient is now up and coloring. Will continue to monitor.       Electronically signed by Jaqueline Raymundo on 11/16/22 at 11:43 PM CST

## 2022-11-17 NOTE — PLAN OF CARE
Problem: Self Harm/Suicidality  Goal: Will have no self-injury during hospital stay  Description: INTERVENTIONS:  1. Q 30 MINUTES: Routine safety checks  2. Q SHIFT & PRN: Assess risk to determine if routine checks are adequate to maintain patient safety  Outcome: Progressing     Problem: Pain  Goal: Verbalizes/displays adequate comfort level or baseline comfort level  Outcome: Progressing     Problem: Depression  Goal: Will be euthymic at discharge  Description: INTERVENTIONS:  1. Administer medication as ordered  2. Provide emotional support via 1:1 interaction with staff  3. Encourage involvement in milieu/groups/activities  4. Monitor for social isolation  Outcome: Progressing     Problem: Anxiety  Goal: Will report anxiety at manageable levels  Description: INTERVENTIONS:  1. Administer medication as ordered  2. Teach and rehearse alternative coping skills  3. Provide emotional support with 1:1 interaction with staff  Outcome: Progressing     Problem: Sleep Disturbance  Goal: Will exhibit normal sleeping pattern  Description: INTERVENTIONS:  1. Administer medication as ordered  2. Decrease environmental stimuli, including noise, as appropriate  3. Discourage social isolation and naps during the day  Outcome: Progressing     Problem: Neurosensory - Adult  Goal: Achieves stable or improved neurological status  Outcome: Progressing  Goal: Absence of seizures  Outcome: Progressing  Goal: Remains free of injury related to seizures activity  Outcome: Progressing  Goal: Achieves maximal functionality and self care  Outcome: Progressing     Problem: Coping  Goal: Pt/Family able to verbalize concerns and demonstrate effective coping strategies  Description: INTERVENTIONS:  1. Assist patient/family to identify coping skills, available support systems and cultural and spiritual values  2.  Provide emotional support, including active listening and acknowledgement of concerns of patient and caregivers  3. Reduce environmental stimuli, as able  4. Instruct patient/family in relaxation techniques, as appropriate  5. Assess for spiritual pain/suffering and initiate Spiritual Care, Psychosocial Clinical Specialist consults as needed  11/17/2022 1249 by Edwardo Chairez RN  Outcome: Progressing  11/17/2022 1204 by Luis Fernando Hernandez  Note:                                                                     Group Therapy Note    Date: 11/17/2022  Start Time: 1000  End Time:  8040  Number of Participants: 15    Type of Group: Psychoeducation    Wellness Binder Information  Module Name:  [address] [place] [address]  Session Number:  1    Group Goal for Pt: To improve knowledge of practical facts about depression    Notes:  Pt demonstrated improved knowledge of practical facts about depression by actively participating in group activity. Status After Intervention:  Unchanged    Participation Level:  Active Listener and Interactive    Participation Quality: Appropriate and Attentive      Speech:  normal      Thought Process/Content: Logical      Affective Functioning: Congruent      Mood: anxious and depressed      Level of consciousness:  Alert and Oriented x4      Response to Learning: Able to verbalize current knowledge/experience, Able to verbalize/acknowledge new learning, and Progressing to goal      Endings: None Reported    Modes of Intervention: Education      Discipline Responsible: Psychoeducational Specialist      Signature:  Luis Fernando Hernandez

## 2022-11-17 NOTE — PROGRESS NOTES
Department of Psychiatry  Attending Progress Note     Chief complaint: \"I slept poorly\"    SUBJECTIVE:   Chart reviewed, discussed with the team. No major issues overnight. Patient is med-compliant. No SEs. Performs ADLs. Social and goes to groups. EEG wnl yesterday. Patient seen by the nursing desk this morning. Calm and cooperative. States he slept poorly last night due to anxiety. Denies SI/HI/AVH. Rates depression 5/10, anxiety 3/10. Slept 6h. Denies physical complaints. States he is doing ok overall. Processing his stressors. States his mother is supportive. Complains of lesions over his knuckles. Having psoriasis flareup. States he went to dermatology and they ordered a cream for him which cost a lot of money and which he is unable to afford. Encouraged him to talk to Dr. Samuel Gibbs about some options. OBJECTIVE    Physical  Wt Readings from Last 3 Encounters:   11/14/22 (!) 329 lb (149.2 kg)   08/22/22 (!) 305 lb (138.3 kg)   05/05/22 (!) 310 lb (140.6 kg)     Temp Readings from Last 3 Encounters:   11/17/22 98.1 °F (36.7 °C)   08/22/22 97.7 °F (36.5 °C)   05/05/22 98.6 °F (37 °C) (Oral)     BP Readings from Last 3 Encounters:   11/17/22 139/83   08/22/22 (!) 150/78   05/05/22 (!) 151/103     Pulse Readings from Last 3 Encounters:   11/17/22 66   08/22/22 88   05/05/22 80        Review of Systems: 14-point review of systems negative except as described above    Mental Status Examination:   Appearance:  Stated age. Gait stable. No abnormal movements or tremor. Behavior: Calm, cooperative, smiled  Speech: Normal in tone, volume, and quality. No slurring, dysarthria or pressured speech noted. Mood: \"Ok\"   Affect: Mood congruent. Thought Process: Appears linear. Thought Content: Denies SI/HI . No overt delusions or paranoia appreciated. Perceptions: Denies auditory or visual hallucinations at present time. Not responding to internal stimuli. Concentration: Intact.    Orientation: to person, place, date, and situation. Language: Intact. Fund of information: Intact. Memory: Recent and remote appear intact. Neurovegitative: Fair appetite and poor sleep. Insight: Improving. Judgment: Improving.     Data  Lab Results   Component Value Date    WBC 10.8 11/14/2022    HGB 16.0 11/14/2022    HCT 47.3 11/14/2022    MCV 87.1 11/14/2022     11/14/2022      Lab Results   Component Value Date     11/14/2022    K 3.9 11/14/2022     11/14/2022    CO2 26 11/14/2022    BUN 13 11/14/2022    CREATININE 0.9 11/14/2022    GLUCOSE 83 11/14/2022    CALCIUM 9.4 11/14/2022    PROT 7.0 11/14/2022    LABALBU 4.7 11/14/2022    BILITOT 0.5 11/14/2022    ALKPHOS 72 11/14/2022    AST 48 (H) 11/14/2022    ALT 67 (H) 11/14/2022    LABGLOM >60 11/14/2022    GFRAA >59 05/05/2022    GLOB 2.4 12/04/2016       Medications    Current Facility-Administered Medications:     mineral oil-hydrophilic petrolatum (AQUAPHOR) ointment, , Topical, TID PRN, Alexsandra Sparrow MD    lamoTRIgine (LAMICTAL) tablet 25 mg, 25 mg, Oral, Daily, Beba Montez, DO, 25 mg at 11/17/22 9562    vitamin D (ERGOCALCIFEROL) capsule 50,000 Units, 50,000 Units, Oral, Weekly, Dayo Linda MD, 50,000 Units at 11/16/22 1312    ibuprofen (ADVIL;MOTRIN) tablet 800 mg, 800 mg, Oral, Q6H PRN, Alexsandra Sparrow MD, 800 mg at 11/16/22 2300    escitalopram (LEXAPRO) tablet 5 mg, 5 mg, Oral, Daily, Alexsandra Sparrow MD, 5 mg at 11/17/22 3971    acetaminophen (TYLENOL) tablet 650 mg, 650 mg, Oral, Q4H PRN, Alexsandra Sparrow MD, 650 mg at 11/16/22 1437    polyethylene glycol (GLYCOLAX) packet 17 g, 17 g, Oral, Daily PRN, Alexsandra Sparrow MD    traZODone (DESYREL) tablet 50 mg, 50 mg, Oral, Nightly PRN, Alexsandra Sparrow MD, 50 mg at 11/16/22 2051    hydrOXYzine HCl (ATARAX) tablet 25 mg, 25 mg, Oral, TID PRN, Alexsandra Sparrow MD, 25 mg at 11/15/22 2157    ASSESSMENT AND PLAN  DSM 5 DIAGNOSIS  Impression  Major depressive disorder, recurrent, severe, without psychotic features  Generalized anxiety disorder  Cannabis use  Seizure disorder  Obesity  History of sleep apnea  Psoriasis  Headache    Continue to observe. Engage in group sessions and recreational activities. Plan to discharge tomorrow. Plan:   1. Psychiatric Medications:   Continue current psychotropic medications as recommended. Monitor for side effects. The risks, benefits, side effects, indications, contraindications, alternatives and adverse effects of the medications have been discussed with patient. 2. Continue to provide supportive psychotherapy. Encourage socialization and participation in recreational activities. Work on coping skills. 3. Medical Issues:    Continue medical monitoring by Dr. Claire Gomez and associates. Seen by neurology and started on Lamictal.  EEG within normal limits. Address arises. 4. Disposition:     to provide outpatient resources and facilitate disposition.      Amount of time spent with patient:      35 minutes with greater than 50 % of the time spent in counseling and collaboration of care

## 2022-11-17 NOTE — PROGRESS NOTES
[facility] Neurology Progress Note      Patient:   Darío Arrieta  MR#:    [id number]   Room:    [facility]-   YOB: 1990  Date of Progress Note: 11/17/2022  Time of Note                           2:58 PM  Consulting Physician:  Charley Soto DO  Attending Physician:  Jakob Menon MD      INTERVAL HISTORY:  No seizures, doing well, no acute events. REVIEW OF SYSTEMS:  Constitutional: No fevers No chills  Neck:No stiffness  Respiratory: No shortness of breath  Cardiovascular: No chest pain No palpitations  Gastrointestinal: No abdominal pain    Genitourinary: No Dysuria  Neurological: No headache, no confusion    PHYSICAL EXAM:    Constitutional -   /83   Pulse 66   Temp 98.1 °F (36.7 °C)   Resp 18   Ht 6' (1.829 m)   Wt (!) 329 lb (149.2 kg)   SpO2 95%   BMI 44.62 kg/m²   General appearance: No acute distress   EYES -   Conjunctiva normal  Pupillary exam as below, see CN exam in the neurologic exam  ENT-    No scars, masses, or lesions over external nose or ears  Hearing normal bilaterally to finger rub  Neck-   No neck masses noted  Thyroid normal   No jugular vein distension  Cardiovascular -   No clubbing, cyanosis, or edema   Pulmonary-   Good expansion, normal effort without use of accessory muscles  Musculoskeletal -   No significant wasting of muscles noted  Gait as below, see gait exam in the neurologic exam  Muscle strength, tone, stability as below see the motor exam in the neurologic exam.   No bony deformities  Skin -   Warm, dry, and intact to inspection and palpation.     No rash, erythema, or pallor  Psychiatric -   Mood, affect, and behavior appear normal    Memory as below see mental status examination in the neurologic exam      NEUROLOGICAL EXAM    Mental status   [x] Awake, alert, oriented   [x] Affect attention and concentration appear appropriate  [x] Recent and remote memory appears unremarkable  [x] Speech normal without dysarthria or aphasia, comprehension and repetition intact. COMMENTS:   Cranial Nerves [x] No VF deficit to confrontation  [x] PERRLA, EOMI, no nystagmus, conjugate eye movements, no ptosis  [x] Face symmetric  [x] Facial sensation intact  [x] Tongue midline no atrophy or fasciculations present  [x] Palate midline, hearing to finger rub normal  [x] Shoulder shrug and SCM testing normal  COMMENTS:   Motor   [x] 5/5 strength x 4 extremities  [x] Normal bulk and tone  [x] No tremor present  [x] No rigidity or bradykinesia noted  COMMENTS:   Sensory  [x] Sensation intact to light touch, pin prick, vibration, and proprioception BLE  [] Sensation intact to light touch, pin prick, vibration, and proprioception BUE  COMMENTS:   Coordination [x] FTN normal bilaterally   [] HTS normal bilaterally  [] MARCELO normal.   COMMENTS:   Reflexes  [x] Symmetric and non-pathological  [x] Toes downgoing bilaterally  [x] No clonus present  COMMENTS:   Gait                  [x] Normal steady gait    [] Ataxic    [] Spastic     [] Magnetic     [] Shuffling  [] Not assessed  COMMENTS:       LABS/IMAGING:    CT HEAD WO CONTRAST    Result Date: 11/14/2022  NO PRIOR REPORT AVAILABLE Exam: CT OF THE BRAIN WITHOUT CONTRAST Clinical data: Headache, uncontrolled hypertension. Technique: Contiguous axial images are obtained from the skull base to vertex without intravenous contrast.  Reformatted/MPR images were performed. Radiation dose: CTDIvol = 40.05 mGy, DLP = 760.14 mGy x cm. Prior studies: No prior studies submitted. Findings:  No acute intracranial abnormality is present. No evidence of acute cortical infarction, hemorrhage, mass or mass effect. No hydrocephalus or abnormal extra-axial fluid collections are present. The posterior fossa is unremarkable. The skull base and calvarium are intact. The included portions of the paranasal sinuses and mastoid air cells are clear. No acute intracranial abnormality is present.  No evidence of acute cortical infarction, hemorrhage, mass or mass effect Recommendation: Follow up as clinically indicated. All CT scans at this facility utilize dose modulation, iterative reconstruction, and/or weight based dosing when appropriate to reduce radiation dose to as low as reasonably achievable. Electronically Signed by Riana Delcid MD at 14-Nov-2022 09:30:43 PM EST               Lab Results   Component Value Date    WBC 10.8 11/14/2022    HGB 16.0 11/14/2022    HCT 47.3 11/14/2022    MCV 87.1 11/14/2022     11/14/2022     Lab Results   Component Value Date     11/14/2022    K 3.9 11/14/2022     11/14/2022    CO2 26 11/14/2022    BUN 13 11/14/2022    CREATININE 0.9 11/14/2022    GLUCOSE 83 11/14/2022    CALCIUM 9.4 11/14/2022    PROT 7.0 11/14/2022    LABALBU 4.7 11/14/2022    BILITOT 0.5 11/14/2022    ALKPHOS 72 11/14/2022    AST 48 (H) 11/14/2022    ALT 67 (H) 11/14/2022    LABGLOM >60 11/14/2022    GFRAA >59 05/05/2022    GLOB 2.4 12/04/2016     Lab Results   Component Value Date    INR 0.91 09/15/2021    PROTIME 12.5 09/15/2021       RECORD REVIEW:   Previous medical records, medications were reviewed at today's visit. Nursing/physician notes, imaging, labs and vitals reviewed. PT,OT and/or speech notes reviewed    ASSESSMENT:  28 y.o. admitted with worsening depression and suicidal ideation currently on our behavioral health unit. He has an underlying history of seizure disorder and is followed by Dr. Brooklyn Taylor. Prior MRI and EEG here was normal.  His last event was a week or so ago. He has been on Keppra, Dilantin and most recently Topamax. Repeat EEG normal.  No seizures overnight, exam stable. PLAN:  Continue Lamictal 25 mg daily and titrate slowly over the next few months to 100 mg bid. Will cover from a seizure standpoint and help with mood stabilization. Repeat MRI felt low yield. Seizure precautions. Epilepsy precautions and seizure first aid discussed.   No driving, heights, swimming, tub baths, open flames, or heavy machinery. Will need follow up with me or Dr. Ly Wild in 2 weeks to continue Lamictal titration. Will sign off, call if needed. Please feel free to call with any questions. [phone] (cell phone).       Fausto Trejo DO  Board Certified Neurology

## 2022-11-17 NOTE — PLAN OF CARE
Problem: Coping  Goal: Pt/Family able to verbalize concerns and demonstrate effective coping strategies  Description: INTERVENTIONS:  1. Assist patient/family to identify coping skills, available support systems and cultural and spiritual values  2. Provide emotional support, including active listening and acknowledgement of concerns of patient and caregivers  3. Reduce environmental stimuli, as able  4. Instruct patient/family in relaxation techniques, as appropriate  5. Assess for spiritual pain/suffering and initiate Spiritual Care, Psychosocial Clinical Specialist consults as needed  Note:                                                                     Group Therapy Note    Date: 11/17/2022  Start Time: 1000  End Time:  1030  Number of Participants: 15    Type of Group: Psychoeducation    Wellness Binder Information  Module Name:  [address] [place] [address]  Session Number:  1    Group Goal for Pt: To improve knowledge of practical facts about depression    Notes:  Pt demonstrated improved knowledge of practical facts about depression by actively participating in group activity. Status After Intervention:  Unchanged    Participation Level:  Active Listener and Interactive    Participation Quality: Appropriate and Attentive      Speech:  normal      Thought Process/Content: Logical      Affective Functioning: Congruent      Mood: anxious and depressed      Level of consciousness:  Alert and Oriented x4      Response to Learning: Able to verbalize current knowledge/experience, Able to verbalize/acknowledge new learning, and Progressing to goal      Endings: None Reported    Modes of Intervention: Education      Discipline Responsible: Psychoeducational Specialist      Signature:  Joyce Guzman

## 2022-11-17 NOTE — PROGRESS NOTES
Progress Note  Fly Parson  11/16/2022 10:49 PM  Subjective:   Admit Date:   11/14/2022      CC/ADMIT DX:       Interval History:   Reviewed overnight events and nursing notes. He has no new medical issues. I have reviewed all labs/diagnostics from the last 24hrs. ROS:   I have done a 10 point ROS and all are negative, except what is mentioned in the HPI. ADULT DIET; Regular    Medications:      lamoTRIgine  25 mg Oral Daily    vitamin D  50,000 Units Oral Weekly    escitalopram  5 mg Oral Daily           Objective:   Vitals: BP (!) 123/92   Pulse 91   Temp 98.1 °F (36.7 °C) (Temporal)   Resp 16   Ht 6' (1.829 m)   Wt (!) 329 lb (149.2 kg)   SpO2 92%   BMI 44.62 kg/m²  No intake or output data in the 24 hours ending 11/16/22 2249  General appearance: alert and cooperative with exam  Extremities: extremities normal, atraumatic, no cyanosis or edema  Neurologic:  No obvious focal neurologic deficits. Skin: no rashes    Assessment and Plan:   Principal Problem:    Depression with suicidal ideation  Active Problems:    Major depressive disorder, recurrent severe without psychotic features ([facility] [address]    Generalized anxiety disorder    Cannabis use disorder  Resolved Problems:    * No resolved hospital problems. *    Seizure D/O    Vit D Def    Plan:   Continue present medication(s)    Replace Vit D   Follow with Psych and Neurology      Discharge planning:    home     Reviewed treatment plans with the patient and/or family.              Electronically signed by Karolina Shah MD on 11/16/2022 at 10:49 PM

## 2022-11-17 NOTE — PROGRESS NOTES
WRAP UP GROUP NOTE:     Patient's Goal:  Providing feedback as to their own progress in the care-plan provided. Pt's have an opportunity to explore self-reflective skills and share any additional cares and concerns not yet addressed. Pt effectively participated. Energy level: low  Appetite: normal  Concentration: improving  Hallucinations: gone  Depression: improved, same  Anxiety: improved  How I worked today: tried a lot  What helps me sleep: try a relaxation exercise  Any questions/complaints/comments: \"just wish there was food sorry but im a big zen who doesn't smoke.      Groups/activities that helped me today were:  Self-enhancement   Therapeutic recreation  Journaling   Relaxation training    Electronically signed by Jaqueline Raymundo on 11/17/2022 at 3:44 AM

## 2022-11-17 NOTE — PROGRESS NOTES
Group Therapy Note    Start Time: 800  End Time:  948  Number of Participants: 13    Type of Group: Community Meeting       Patient's Goal:  \"attending and participating in more group activities\"      Notes:      Participation Level:  Active Listener       Participation Quality: Appropriate      Thought Process/Content: Logical      Affective Functioning: Congruent      Mood:  calm      Level of consciousness:  Alert      Modes of Intervention: Support      Discipline Responsible: Behavioral Health Tech II      Signature:  Carlos Jamison

## 2022-11-17 NOTE — PROGRESS NOTES
[facility] Adult Unit Daily Assessment  Nursing Progress Note    Room: [facility]   Name: Sylvia Elizalde   Age: 28 y.o. Gender: male   Dx: Depression with suicidal ideation  Precautions: suicide risk  Inpatient Status: voluntary       SLEEP:  Sleep Quality Very good  Sleep Medications: No   PRN Sleep Meds: No       MEDICAL:  Other PRN Meds: No   Med Compliant: Yes  Accu-Chek: No  Oxygen/CPAP/BiPAP: No  CIWA/CINA: No   PAIN Assessment: none  Side Effects from medication: No      Metabolic Screening:  Lab Results   Component Value Date    LABA1C 5.5 11/16/2022     Lab Results   Component Value Date    CHOL 190 11/16/2022    CHOL 166 02/27/2020     Lab Results   Component Value Date    TRIG 267 (H) 11/16/2022    TRIG 222 (H) 02/27/2020     Lab Results   Component Value Date    HDL 32 (L) 11/16/2022    HDL 33 (L) 02/27/2020     No components found for: LDLCAL  No results found for: LABVLDL  Body mass index is 44.62 kg/m². BP Readings from Last 2 Encounters:   11/17/22 139/83   08/22/22 (!) 150/78         Medical Bed:   Is patient in a medical bed? no   If medical bed is in use, has nursing secured room while patient is awake and out of the room? NA  Has safety checks by nursing been completed on the bed/room this shift? NA    Protective Factors:  Patient identifies protective factors with nursing staff as follows: Identifies reasons for living: Yes   Supportive Social Network or family: Yes    Belief that suicide is immoral/high spirituality: No   Responsibility to family or others/living with family: Yes   Fear of death or dying due to pain and suffering: No   Engaged in work or school: Yes     If Patient is unable to identify, reason why? PSYCH:  Depression: 2   Anxiety: 2   SI denies suicidal ideation   Risk of Suicide: No Risk  HI Negative for homicidal ideation      AVH:no If Hallucinations are present, describe?          GENERAL:  Appetite: good   Percent Meals: 100%   Social: Yes   Speech: normal Appearance: appropriately dressed, appropriately groomed, good hygiene, looks younger, and healthy looking    GROUP:  Group Participation: Yes  Participation Quality: Active Listener and Interactive    Notes: patient sitting in the day area playing cards and socializing with other patients. Patient states that he is feeling much better today. Patient states that his depression is rating at a 2 and his anxiety is also at a 2. Patient denies SI HI and AVH. Patient reports that he did not take his atarax the night before and is not feeling quite as sluggish as he did the day before. Will continue to monitor for safety.         Electronically signed by Benji Jiang RN on 11/17/22 at 12:42 PM CST

## 2022-11-17 NOTE — PROGRESS NOTES
Treatment Team Note:    Target Symptoms/Reason for admission: Per nursing admission assessment - Reason for Admission: Scooter Mays is a 28 y.o. male who presents to the emergency department with history of depression not currently on any medication he denies any thoughts of HI or hallucinations. Has had intermittent thoughts of SI without acting up on for multiple years. Tells me he thought about it today and was actually seen walking out into traffic. An innocent bystander called the police who brought him here. Diagnoses per psych provider: Suicidal ideation [R45.851]  Suicide attempt ([facility] [address] Luis Aleena  Depression, unspecified depression type [F32. A]  Depression with suicidal ideation [F32. A, R45.851]  Major depressive disorder, recurrent severe without psychotic features ([address] [F33.2]    Therapist met with treatment team to discuss patients treatment and discharge plans. Patient's aftercare plan is: [address]    Aftercare appointments made: Yes    Pt lives with: patient lives alone    Collateral obtained from:   Mom  Collateral obtained on:11/16/22    Attending groups: Yes    Behavior: cooperative, social with staff/peers    Has patient been completing ADL's:  Yes    SI:  patient denies SI  Plan: no   If yes describe: N/A - patient denies plan  HI:  patient denies HI  If present describe: N/A  Delusions: patient denies delusions  If present describe: N/A  Hallucinations: patient denies hallucinations  If present describe: N/A    Patient rates their -- Depression: 1-10:  3  Anxiety:1-10:  3    Sleeping: Fair    Taking medication: Yes    Misc:

## 2022-11-18 VITALS
HEART RATE: 79 BPM | DIASTOLIC BLOOD PRESSURE: 85 MMHG | BODY MASS INDEX: 42.66 KG/M2 | OXYGEN SATURATION: 96 % | WEIGHT: 315 LBS | SYSTOLIC BLOOD PRESSURE: 166 MMHG | RESPIRATION RATE: 21 BRPM | TEMPERATURE: 97.3 F | HEIGHT: 72 IN

## 2022-11-18 PROBLEM — R45.851 DEPRESSION WITH SUICIDAL IDEATION: Status: RESOLVED | Noted: 2022-11-14 | Resolved: 2022-11-18

## 2022-11-18 PROBLEM — F32.A DEPRESSION WITH SUICIDAL IDEATION: Status: RESOLVED | Noted: 2022-11-14 | Resolved: 2022-11-18

## 2022-11-18 PROCEDURE — 99239 HOSP IP/OBS DSCHRG MGMT >30: CPT | Performed by: PSYCHIATRY & NEUROLOGY

## 2022-11-18 PROCEDURE — 6370000000 HC RX 637 (ALT 250 FOR IP): Performed by: PSYCHIATRY & NEUROLOGY

## 2022-11-18 PROCEDURE — 5130000000 HC BRIDGE APPOINTMENT

## 2022-11-18 RX ORDER — LAMOTRIGINE 25 MG/1
25 TABLET ORAL DAILY
Qty: 30 TABLET | Refills: 1 | Status: SHIPPED | OUTPATIENT
Start: 2022-11-19 | End: 2022-12-19

## 2022-11-18 RX ORDER — HYDROXYZINE HYDROCHLORIDE 25 MG/1
25 TABLET, FILM COATED ORAL 3 TIMES DAILY PRN
Qty: 45 TABLET | Refills: 1 | Status: SHIPPED | OUTPATIENT
Start: 2022-11-18 | End: 2022-12-18

## 2022-11-18 RX ORDER — ERGOCALCIFEROL 1.25 MG/1
50000 CAPSULE ORAL WEEKLY
Qty: 11 CAPSULE | Refills: 1 | Status: SHIPPED | OUTPATIENT
Start: 2022-11-23 | End: 2023-02-02

## 2022-11-18 RX ORDER — ESCITALOPRAM OXALATE 5 MG/1
5 TABLET ORAL DAILY
Qty: 30 TABLET | Refills: 1 | Status: SHIPPED | OUTPATIENT
Start: 2022-11-19 | End: 2022-12-19

## 2022-11-18 RX ORDER — TRAZODONE HYDROCHLORIDE 50 MG/1
50 TABLET ORAL NIGHTLY PRN
Qty: 30 TABLET | Refills: 1 | Status: SHIPPED | OUTPATIENT
Start: 2022-11-18 | End: 2022-12-18

## 2022-11-18 RX ADMIN — LAMOTRIGINE 25 MG: 25 TABLET ORAL at 08:18

## 2022-11-18 RX ADMIN — ESCITALOPRAM 5 MG: 5 TABLET, FILM COATED ORAL at 08:18

## 2022-11-18 NOTE — PROGRESS NOTES
Behavioral Health   Discharge Note  Bridge Appointment completed: Reviewed Discharge Instructions with patient. Patient verbalizes understanding and agreement with the discharge plan using the teachback method. Referral for Outpatient Tobacco Cessation Counseling, upon discharge (leonarda X if applicable and completed):    ( )  Hospital staff assisted patient to call Quit Line or faxed referral during hospitalization                  ( )  Recognizing danger situations (included triggers and roadblocks), if not completed on admission                    ( )  Coping skills (new ways to manage stress, exercise, relaxation techniques, changing routine, distraction), if not completed on admission                                                           ( )  Basic information about quitting (benefits of quitting, techniques in how to quit, available resources, if not completed on admission  ( ) Referral for counseling faxed to [facility]   ( ) Patient refused referral  ( ) Patient refused counseling  ( ) Patient refused smoking cessation medication upon discharge  (X) Non smoker    Vaccinations (leonarda X if applicable and completed):  ( ) Patient states already received influenza vaccine elsewhere  ( ) Patient received influenza vaccine during this hospitalization  (X) Patient refused influenza vaccine at this time      Pt discharged with followings belongings:   Dental Appliances: None  Vision - Corrective Lenses: None  Hearing Aid: None  Jewelry: None  Body Piercings Removed: No  Clothing: Other (Comment) (see chart)  Other Valuables: Other (Comment) (see chart)     No belongings or valuables located on unit or safe/security. Patient left department with   via transportation provided by, discharged to home/self-care. Patient education on aftercare instructions: yes  Patient verbalize understanding of AVS:  yes. Suicidal Ideations? No Risk of Suicide: No Risk AVH? denies HI?  Negative for homicidal ideation Status EXAM upon discharge:  Mental Status and Behavioral Exam  Normal: Yes  Level of Assistance: Independent/Self  Facial Expression: Brightened  Affect: Congruent  Level of Consciousness: Alert  Frequency of Checks: 4 times per hour, close  Mood:Normal: Yes  Mood: Depressed, Anxious  Motor Activity:Normal: Yes  Eye Contact: Good  Observed Behavior: Friendly, Cooperative  Sexual Misconduct History: Current - no  Preception: [place] to person, [place] to time, [place] to place, [place] to situation  Attention:Normal: Yes  Thought Processes:  (linear)  Thought Content:Normal: Yes  Thought Content:  (denies SI, HI, AVH)  Depression Symptoms:  (rates depression 1)  Anxiety Symptoms: Generalized (rates anxiety 1)  Elenita Symptoms: No problems reported or observed. Hallucinations: None  Delusions: No  Memory:Normal: Yes  Memory:  (recent and remote intact)  Insight and Judgment: Yes  Insight and Judgment:  (improved)    AVS/Transition Record has been discussed with patient and a copy was given to the patient. The AVS/Transition Record was faxed to the next level of care today.     Electronically signed by Elisa Da Silva RN on 11/18/2022 at 1:04 PM
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
## 2022-11-18 NOTE — PLAN OF CARE
Problem: Anxiety  Goal: Will report anxiety at manageable levels  Outcome: Progressing       Group Therapy Note     Date: 11/18/2022  Start Time: 1100  End Time:  2602  Number of Participants: 14     Type of Group: Psychoeducation     Wellness Binder Information  Module Name:  staying well  Session Number:  1     Patient's Goal:  daily maintenance and coping skills     Notes:  pt acknowledged use of positive coping skills daily to help stay well.      Status After Intervention:  Improved     Participation Level: Interactive     Participation Quality: Appropriate, Attentive, and Sharing        Speech:  normal        Thought Process/Content: Logical        Affective Functioning: Congruent        Mood: congruent        Level of consciousness:  Alert, Oriented x4, and Attentive        Response to Learning: Able to verbalize current knowledge/experience        Endings: None Reported     Modes of Intervention: Education        Discipline Responsible: Psychoeducational Specialist        Signature:  Barrett Dominguez

## 2022-11-18 NOTE — DISCHARGE INSTR - DIET

## 2022-11-18 NOTE — PROGRESS NOTES
Discharge Note     Patient is discharging on this date. Patient denies SI, HI, and AVH at this time. Patient reports improvement in behavior and is leaving unit in overall good condition. SW and patient discussed patient's follow up appointments and importance of attending appointments as scheduled, patient voiced understanding and agreement. Patient and SW also discussed patient's safety plan and patient was able to verbally identify: warning signs, coping strategies, places and people that help make the patient feel better/distract negative thoughts, friends/family/agencies/professionals the patient can reach out to in a crisis, and something that is important to the patient/worth living for. Patient was provided the national suicide prevention hotline number (7-548-651-134.465.8468) as well as local community behavioral health [facility]) crisis number for emergencies (1-925.418.7169). Discharge Disposition: home -lives alone      Pt to follow up with [facility] on November 30 ,2022 at 1:00 PM with Ynes Pizano, , for the intake appointment. Patient will follow up with [facility] on December 9 ,2022 at 9:00 AM with HARVINDER Hernandez for the medication management appointment. Referral to outpatient tobacco cessation counseling treatment:  Patient refused referral to outpatient tobacco cessation counseling    SW offered to assist patient with transportation, patient declined transportation assistance, he had someone come and pick him up.

## 2022-11-18 NOTE — PROGRESS NOTES
Treatment Team Note:     Target Symptoms/Reason for admission: Per nursing admission assessment - Reason for Admission: Beverly Jackson is a 28 y.o. male who presents to the emergency department with history of depression not currently on any medication he denies any thoughts of HI or hallucinations. Has had intermittent thoughts of SI without acting up on for multiple years. Tells me he thought about it today and was actually seen walking out into traffic. An innocent bystander called the police who brought him here. Diagnoses per psych provider: Suicidal ideation [R45.851]  Suicide attempt ([facility] [address] Aracelis Daysi  Depression, unspecified depression type [F32. A]  Depression with suicidal ideation [F32. A, R45.851]  Major depressive disorder, recurrent severe without psychotic features ([address] [F33.2]     Therapist met with treatment team to discuss patients treatment and discharge plans. Patient's aftercare plan is: [address]     Aftercare appointments made: yes     Pt lives with: alone     Collateral obtained from: mom  Collateral obtained on:11/16/22     Attending groups: Yes     Behavior: cooperative, social with peers/staff, affect brighter, reports his depression/anxiety have improved. Has patient been completing ADL's:  Yes     SI:  patient denies SI  Plan: no   If yes describe: N/A - patient denies plan  HI:  patient denies HI  If present describe: N/A  Delusions: patient denies delusions  If present describe: N/A  Hallucinations: patient denies hallucinations  If present describe: N/A     Patient rates their -- Depression: 1-10:  3  Anxiety:1-10:  3     Sleeping: Fair     Taking medication: Yes     Misc:Tentative discharge Thursday/Friday.                                                              Revision History

## 2022-11-18 NOTE — PROGRESS NOTES
[facility] Adult Unit Daily Assessment  Nursing Progress Note    Room: [facility]/608-02   Name: Stephany Mcintosh   Age: 28 y.o. Gender: male   Dx: Depression with suicidal ideation  Precautions: suicide risk and seizure precautions  Inpatient Status: voluntary       SLEEP:  Sleep Quality Fair  Sleep Medications: No   PRN Sleep Meds: Yes       MEDICAL:  Other PRN Meds: No   Med Compliant: Yes  Accu-Chek: No  Oxygen/CPAP/BiPAP: No  CIWA/CINA: No   PAIN Assessment: none  Side Effects from medication: No      Metabolic Screening:  Lab Results   Component Value Date    LABA1C 5.5 11/16/2022     Lab Results   Component Value Date    CHOL 190 11/16/2022    CHOL 166 02/27/2020     Lab Results   Component Value Date    TRIG 267 (H) 11/16/2022    TRIG 222 (H) 02/27/2020     Lab Results   Component Value Date    HDL 32 (L) 11/16/2022    HDL 33 (L) 02/27/2020     No components found for: LDLCAL  No results found for: LABVLDL  Body mass index is 44.62 kg/m². BP Readings from Last 2 Encounters:   11/17/22 123/71   08/22/22 (!) 150/78         Medical Bed:   Is patient in a medical bed? no   If medical bed is in use, has nursing secured room while patient is awake and out of the room? NA  Has safety checks by nursing been completed on the bed/room this shift? yes    Protective Factors:  Patient identifies protective factors with nursing staff as follows: Identifies reasons for living: Yes   Supportive Social Network or family: Yes    Belief that suicide is immoral/high spirituality: No   Responsibility to family or others/living with family: Yes   Fear of death or dying due to pain and suffering: No   Engaged in work or school: Yes     If Patient is unable to identify, reason why? N/A      PSYCH:  Depression: 1   Anxiety: 1   SI denies suicidal ideation   Risk of Suicide: No Risk  HI Negative for homicidal ideation      AVH:no If Hallucinations are present, describe?  N/A        GENERAL:  Appetite: good   Percent Meals:    Social: Yes Speech: normal   Appearance: appropriately dressed    GROUP:  Group Participation: Yes  Participation Quality: Minimal    Notes:     Patient has been social with staff and peers. Patient has been seen playing card games with others. Patient reports that he felt good this morning and was not tired. He also reported that he has not taken naps today so he should be able to sleep tonight. Patient performed ADLs this evening. Patient requested to be waken up early to be able to make a phone call in time for a ride. Patient is now resting. Will continue to monitor.      Electronically signed by Joy Ladd on 11/17/22 at 11:39 PM CST

## 2022-11-18 NOTE — PROGRESS NOTES
Staff called and asked this  to look in the clothes closet for a 2X shirt and pants. Only a shirt was found and this  took the shirt to the floor and it was given to pt. Staff expressed gratitude for spiritual care and assistance.      Electronically signed by Elvira Irene on 11/18/2022 at 12:30 PM

## 2022-11-18 NOTE — PROGRESS NOTES
Group Therapy Note    Start Time: 800  End Time:  650  Number of Participants: 13    Type of Group: Community Meeting       Patient's Goal:  \"staying positive and keeping my goals  and plans on schedule\"      Notes:      Participation Level:  Active Listener       Participation Quality: Appropriate      Thought Process/Content: Logical      Affective Functioning: Congruent      Mood:  calm      Level of consciousness:  Alert      Modes of Intervention: Support      Discipline Responsible: Behavioral Health Tech II      Signature:  Wai Vines

## 2022-11-18 NOTE — PLAN OF CARE
Problem: Self Harm/Suicidality  Goal: Will have no self-injury during hospital stay  Description: INTERVENTIONS:  1. Q 30 MINUTES: Routine safety checks  2. Q SHIFT & PRN: Assess risk to determine if routine checks are adequate to maintain patient safety  Outcome: Completed     Problem: Pain  Goal: Verbalizes/displays adequate comfort level or baseline comfort level  Outcome: Completed     Problem: Depression  Goal: Will be euthymic at discharge  Description: INTERVENTIONS:  1. Administer medication as ordered  2. Provide emotional support via 1:1 interaction with staff  3. Encourage involvement in milieu/groups/activities  4. Monitor for social isolation  Outcome: Completed     Problem: Anxiety  Goal: Will report anxiety at manageable levels  Description: INTERVENTIONS:  1. Administer medication as ordered  2. Teach and rehearse alternative coping skills  3. Provide emotional support with 1:1 interaction with staff  11/18/2022 1235 by Lorri Rey RN  Outcome: Completed  11/18/2022 1138 by Flakito Horvath  Outcome: Progressing     Problem: Sleep Disturbance  Goal: Will exhibit normal sleeping pattern  Description: INTERVENTIONS:  1. Administer medication as ordered  2. Decrease environmental stimuli, including noise, as appropriate  3. Discourage social isolation and naps during the day  Outcome: Completed     Problem: Neurosensory - Adult  Goal: Achieves stable or improved neurological status  Outcome: Completed  Goal: Absence of seizures  Outcome: Completed  Goal: Remains free of injury related to seizures activity  Outcome: Completed  Goal: Achieves maximal functionality and self care  Outcome: Completed     Problem: Coping  Goal: Pt/Family able to verbalize concerns and demonstrate effective coping strategies  Description: INTERVENTIONS:  1. Assist patient/family to identify coping skills, available support systems and cultural and spiritual values  2.  Provide emotional support, including active listening and acknowledgement of concerns of patient and caregivers  3. Reduce environmental stimuli, as able  4. Instruct patient/family in relaxation techniques, as appropriate  5.  Assess for spiritual pain/suffering and initiate Spiritual Care, Psychosocial Clinical Specialist consults as needed  Outcome: Completed

## 2022-11-18 NOTE — PROGRESS NOTES
CLINICAL PHARMACY NOTE: MEDS TO BEDS    Total # of Prescriptions Filled: 6   The following medications were delivered to the patient:  Discharge Medication List as of 11/18/2022 12:43 PM        START taking these medications    Details   escitalopram (LEXAPRO) 5 MG tablet Take 1 tablet by mouth daily, Disp-30 tablet, R-1Normal      hydrOXYzine HCl (ATARAX) 25 MG tablet Take 1 tablet by mouth 3 times daily as needed for Anxiety, Disp-45 tablet, R-1Normal      traZODone (DESYREL) 50 MG tablet Take 1 tablet by mouth nightly as needed for Sleep, Disp-30 tablet, R-1Normal      Ergocalciferol (VITAMIN D) 71361 units CAPS Take 50,000 Units by mouth once a week for 11 doses, Disp-11 capsule, R-1Normal         Lamotrigine 25 mg      Additional Documentation:    Mitch Mo RN picked up Rx's at pharmacy.

## 2022-11-18 NOTE — PROGRESS NOTES
Progress Note  [facility]  11/17/2022 11:01 PM  Subjective:   Admit Date:   11/14/2022      CC/ADMIT DX:       Interval History:   Reviewed overnight events and nursing notes. He denies any physical complaints. I have reviewed all labs/diagnostics from the last 24hrs. ROS:   I have done a 10 point ROS and all are negative, except what is mentioned in the HPI. ADULT DIET; Regular    Medications:      lamoTRIgine  25 mg Oral Daily    vitamin D  50,000 Units Oral Weekly    escitalopram  5 mg Oral Daily           Objective:   Vitals: /71   Pulse 88   Temp 98.2 °F (36.8 °C) (Temporal)   Resp 18   Ht 6' (1.829 m)   Wt (!) 329 lb (149.2 kg)   SpO2 94%   BMI 44.62 kg/m²  No intake or output data in the 24 hours ending 11/17/22 2301  General appearance: alert and cooperative with exam  Extremities: extremities normal, atraumatic, no cyanosis or edema  Neurologic:  No obvious focal neurologic deficits. Skin: no rashes    Assessment and Plan:   Principal Problem:    Depression with suicidal ideation  Active Problems:    Major depressive disorder, recurrent severe without psychotic features ([facility] [address]    Generalized anxiety disorder    Cannabis use disorder  Resolved Problems:    * No resolved hospital problems. *    Seizure D/O    Vit D Def    Plan:   Continue present medication(s)    He is medically stable. I will monitor for any changes or concerns. Follow with Psych and Neurology      Discharge planning:    home     Reviewed treatment plans with the patient and/or family.              Electronically signed by Martell Lauren MD on 11/17/2022 at 11:01 PM

## 2022-11-18 NOTE — PROGRESS NOTES
WRAP UP GROUP NOTE:     Group Time: 2000-2030    Patient's Goal:  Providing feedback as to their own progress in the care-plan provided. Pt's have an opportunity to explore self-reflective skills and share any additional cares and concerns not yet addressed. Pt effectively participated. Energy level: high  Appetite: good  Concentration: good  Hallucinations: gone  Depression: improved  Anxiety: improved  How I worked today: worked hard  What helps me sleep: read  Any questions/complaints/comments:  \"No fridge is stocked!!!\"    Groups/activities that helped me today were:  Nursing education  - double jessica    Electronically signed by Jordan Levine on 11/17/2022 at 11:43 PM

## 2022-11-19 NOTE — PROGRESS NOTES
SW attempted to contact pt to follow-up with him after he discharged from the unit yesterday to see if he had any questions or concerns that needed to be addressed. SW called the contact number listed for the pt and spoke with him. He said he got his medication and was doing good so far. But he did mention that he was reading over his summary on his discharge paperwork, and claimed that some of the information listed about the series of events leading up to him coming to the hospital were incorrect, and that he has to go to court soon over custody against his ex, and asked if that information could be changed, to which the SW explained that now that he is discharged, there isn't anything we can do about that. SW suggested that he may want to come and  his medical record from his hospital visit, and then he would have all of the notes from when he was here and maybe he could use some of those in his court hearing. He denied having any other questions.

## 2022-12-08 ENCOUNTER — TELEPHONE (OUTPATIENT)
Dept: PSYCHIATRY | Age: 32
End: 2022-12-08

## 2023-01-19 ENCOUNTER — OFFICE VISIT (OUTPATIENT)
Dept: FAMILY MEDICINE CLINIC | Facility: CLINIC | Age: 33
End: 2023-01-19
Payer: COMMERCIAL

## 2023-01-19 VITALS
OXYGEN SATURATION: 95 % | DIASTOLIC BLOOD PRESSURE: 86 MMHG | SYSTOLIC BLOOD PRESSURE: 124 MMHG | HEIGHT: 72 IN | HEART RATE: 75 BPM | WEIGHT: 315 LBS | BODY MASS INDEX: 42.66 KG/M2

## 2023-01-19 DIAGNOSIS — E29.1 HYPOGONADISM IN MALE: ICD-10-CM

## 2023-01-19 DIAGNOSIS — R41.840 POOR CONCENTRATION: ICD-10-CM

## 2023-01-19 DIAGNOSIS — E66.01 OBESITY, CLASS III, BMI 40-49.9 (MORBID OBESITY): ICD-10-CM

## 2023-01-19 DIAGNOSIS — R56.9 SEIZURES: ICD-10-CM

## 2023-01-19 DIAGNOSIS — G47.33 OBSTRUCTIVE SLEEP APNEA SYNDROME: ICD-10-CM

## 2023-01-19 DIAGNOSIS — A08.4 VIRAL GASTROENTERITIS: Primary | ICD-10-CM

## 2023-01-19 PROCEDURE — 99214 OFFICE O/P EST MOD 30 MIN: CPT | Performed by: NURSE PRACTITIONER

## 2023-01-19 RX ORDER — HYDROXYZINE HYDROCHLORIDE 25 MG/1
TABLET, FILM COATED ORAL
COMMUNITY
Start: 2022-11-18

## 2023-01-19 RX ORDER — ERGOCALCIFEROL 1.25 MG/1
CAPSULE ORAL
COMMUNITY
Start: 2022-11-18

## 2023-01-19 RX ORDER — ESCITALOPRAM OXALATE 5 MG/1
TABLET ORAL
COMMUNITY
Start: 2022-11-18

## 2023-01-19 RX ORDER — TRAZODONE HYDROCHLORIDE 50 MG/1
TABLET ORAL
COMMUNITY
Start: 2022-11-18

## 2023-01-19 RX ORDER — LAMOTRIGINE 25 MG/1
TABLET ORAL
COMMUNITY
Start: 2022-12-12

## 2023-01-19 NOTE — PROGRESS NOTES
"Chief Complaint  Abdominal Pain, Diarrhea, Shortness of Breath, and Fatigue    Subjective    History of Present Illness      Patient presents to [facility] for   History of Present Illness  Pt is here today with c/o abdominal pain and diarrhea X3 days. Pt would like to discuss testosterone levels, weight loss, adhd.        Review of Systems    I have reviewed and agree with the HPI and ROS information as above.  Lara Annvitor Carvalho, APRN     Objective   Vital Signs:   /86   Pulse 75   Ht 182.9 cm (72\")   Wt (!) 152 kg (336 lb)   SpO2 95%   BMI 45.57 kg/m²     Class 3 Severe Obesity (BMI >=40). Obesity-related health conditions include the following: obstructive sleep apnea. Obesity is unchanged. BMI is is above average; BMI management plan is completed. We discussed low calorie, low carb based diet program and consulting a Bariatric surgeon.      Physical Exam  Constitutional:       Appearance: He is well-developed. He is morbidly obese.   HENT:      Head: Normocephalic and atraumatic.      Right Ear: Tympanic membrane, ear canal and external ear normal.      Left Ear: Tympanic membrane, ear canal and external ear normal.      Nose: Nose normal. No septal deviation, nasal tenderness or congestion.      Mouth/Throat:      Lips: Pink. No lesions.      Mouth: Mucous membranes are moist. No oral lesions.      Dentition: Normal dentition.      Pharynx: Oropharynx is clear. No pharyngeal swelling, oropharyngeal exudate or posterior oropharyngeal erythema.   Eyes:      General: Lids are normal. Vision grossly intact. No scleral icterus.        Right eye: No discharge.         Left eye: No discharge.      Extraocular Movements: Extraocular movements intact.      Conjunctiva/sclera: Conjunctivae normal.      Right eye: Right conjunctiva is not injected.      Left eye: Left conjunctiva is not injected.      Pupils: Pupils are equal, round, and reactive to light.   Neck:      Thyroid: No " thyroid mass.      Trachea: Trachea normal.   Cardiovascular:      Rate and Rhythm: Normal rate and regular rhythm.      Heart sounds: Normal heart sounds. No murmur heard.    No gallop.   Pulmonary:      Effort: Pulmonary effort is normal.      Breath sounds: Normal breath sounds and air entry. No wheezing, rhonchi or rales.   Abdominal:      General: There is no distension.      Palpations: Abdomen is soft. There is no mass.      Tenderness: There is no abdominal tenderness. There is no right CVA tenderness, left CVA tenderness, guarding or rebound.   Musculoskeletal:         General: No tenderness or deformity. Normal range of motion.      Cervical back: Full passive range of motion without pain, normal range of motion and neck supple.      Thoracic back: Normal.      Right lower leg: No edema.      Left lower leg: No edema.   Skin:     General: Skin is warm and dry.      Coloration: Skin is not jaundiced.      Findings: No rash.   Neurological:      Mental Status: He is alert and oriented to person, place, and time.      Sensory: Sensation is intact.      Motor: Motor function is intact.      Coordination: Coordination is intact.      Gait: Gait is intact.      Deep Tendon Reflexes: Reflexes are normal and symmetric.   Psychiatric:         Mood and Affect: Mood and affect normal.         Judgment: Judgment normal.            PHQ-2 Depression Screening  Little interest or pleasure in doing things? 0-->not at all   Feeling down, depressed, or hopeless? 0-->not at all   PHQ-2 Total Score 0     PHQ-9 Depression Screening  Little interest or pleasure in doing things? 0-->not at all   Feeling down, depressed, or hopeless? 0-->not at all   Trouble falling or staying asleep, or sleeping too much?     Feeling tired or having little energy?     Poor appetite or overeating?     Feeling bad about yourself - or that you are a failure or have let yourself or your family down?     Trouble concentrating on things, such as  reading the newspaper or watching television?     Moving or speaking so slowly that other people could have noticed? Or the opposite - being so fidgety or restless that you have been moving around a lot more than usual?     Thoughts that you would be better off dead, or of hurting yourself in some way?     PHQ-9 Total Score 0   If you checked off any problems, how difficult have these problems made it for you to do your work, take care of things at home, or get along with other people?        Result Review  Data Reviewed:                   Assessment and Plan      Diagnoses and all orders for this visit:    1. Viral gastroenteritis (Primary)    2. Hypogonadism in male    3. Obstructive sleep apnea syndrome    4. Obesity, Class III, BMI 40-49.9 (morbid obesity) ([facility])  -     Bariatric Nutritional Counseling    5. Poor concentration    6. Seizures ([facility])  Comments:  Follows with Dr Wyatt.     Patient presents today with several concerns.  Acutely he feels that he may have a stomach bug that his household has been sharing complains of feeling achy and has had diarrhea for 2 days now.  No fever.  He is not wanting any further testing at this time.  He also wants to discuss his testosterone he had levels drawn today external facility recently and was told that his levels are still low and he wants to discuss replacement options today.  He also feels that he has potential ADHD due to his poor concentration and attentive deficit issues.  He wants to discuss the process of getting evaluated for this today.  He admits to a history of seizures that he follows with Dr. Wyatt for and is treated with lamotrigine.  He also wants to work towards a healthier diet and lifestyle and would like to discuss a nutritional program.  Plan:   1. He will bring back records for recent testosterone labs for me to review. Options discussed, counseling done. S/e discussed. He wants to start injections if qualifies.   2. C/o poor focus,  feels he would benefit from vyvanse. Discussed with history of seizures would need neuro clearance and also consult with Dr Morales for est diagnosis. He VU.  3. Consult bariatric program for weight loss and healthier lifestyle.   4. Viral GE-he declines any viral testing, has zofran at home he can use prn, discussed brat diet, rest, hydration.         Follow Up   Return for Next scheduled follow up.  Patient was given instructions and counseling regarding his condition or for health maintenance advice. Please see specific information pulled into the AVS if appropriate.

## 2023-03-13 ENCOUNTER — OFFICE VISIT (OUTPATIENT)
Dept: NEUROLOGY | Age: 33
End: 2023-03-13
Payer: MEDICAID

## 2023-03-13 VITALS
DIASTOLIC BLOOD PRESSURE: 81 MMHG | HEART RATE: 79 BPM | WEIGHT: 315 LBS | BODY MASS INDEX: 42.66 KG/M2 | SYSTOLIC BLOOD PRESSURE: 129 MMHG | HEIGHT: 72 IN | RESPIRATION RATE: 20 BRPM

## 2023-03-13 DIAGNOSIS — G47.33 SLEEP APNEA, OBSTRUCTIVE: ICD-10-CM

## 2023-03-13 DIAGNOSIS — G40.909 SEIZURE DISORDER (HCC): Primary | ICD-10-CM

## 2023-03-13 DIAGNOSIS — G43.119 INTRACTABLE MIGRAINE WITH AURA WITHOUT STATUS MIGRAINOSUS: ICD-10-CM

## 2023-03-13 PROCEDURE — 99214 OFFICE O/P EST MOD 30 MIN: CPT | Performed by: PSYCHIATRY & NEUROLOGY

## 2023-03-13 PROCEDURE — G8484 FLU IMMUNIZE NO ADMIN: HCPCS | Performed by: PSYCHIATRY & NEUROLOGY

## 2023-03-13 PROCEDURE — G8427 DOCREV CUR MEDS BY ELIG CLIN: HCPCS | Performed by: PSYCHIATRY & NEUROLOGY

## 2023-03-13 PROCEDURE — 1036F TOBACCO NON-USER: CPT | Performed by: PSYCHIATRY & NEUROLOGY

## 2023-03-13 PROCEDURE — G8417 CALC BMI ABV UP PARAM F/U: HCPCS | Performed by: PSYCHIATRY & NEUROLOGY

## 2023-03-13 RX ORDER — RIZATRIPTAN BENZOATE 10 MG/1
10 TABLET ORAL
Qty: 12 TABLET | Refills: 5 | Status: SHIPPED | OUTPATIENT
Start: 2023-03-13 | End: 2023-03-13

## 2023-03-13 RX ORDER — HYDROXYZINE HYDROCHLORIDE 25 MG/1
TABLET, FILM COATED ORAL
COMMUNITY
Start: 2022-11-18

## 2023-03-13 RX ORDER — LAMOTRIGINE 100 MG/1
100 TABLET, EXTENDED RELEASE ORAL DAILY
Qty: 30 TABLET | Refills: 5 | Status: SHIPPED | OUTPATIENT
Start: 2023-03-13

## 2023-03-13 NOTE — PATIENT INSTRUCTIONS
INSTRUCTIONS:  Increase the Lamictal to 50 mg twice a day  Try using Maxalt 10 mg when you get the visual symptoms. This is a migraine medications.   Use the CPAP during sleep nightly

## 2023-03-13 NOTE — PROGRESS NOTES
education and counseling:  - Discussed seizure precautions. - If has a seizure with loss of awareness, no driving until 3 months free of such a seizure. - Regarding the importance of taking their medications as prescribed. - Safety issues were discussed including safety from falls or other dangerous situations, first aid during and after an acute seizure, to take showers, not baths, avoid swimming alone, avoid operating heavy machinery, avoid heights, avoid proximity to open fires/flames, high impact sports, and bodies of water (lakes, rivers, swimming pools) unless accompanied by another.   - Avoid situation that can enhance seizure recurrence such as lack of sleep, undue stress and fatigue, excessive alcohol consumption and use of recreational drugs. Plan:   Increase the Lamictal to 50 mg twice a day  Try using Maxalt 10 mg when you get the visual symptoms. This is a migraine medications.   Use the CPAP during sleep nightly  Call with any seizure  Follow up here in 6 months    Electronically signed by Anastasia Lipscomb MD on 3/13/23

## 2024-11-11 ENCOUNTER — TELEPHONE (OUTPATIENT)
Dept: FAMILY MEDICINE CLINIC | Facility: CLINIC | Age: 34
End: 2024-11-11
Payer: COMMERCIAL

## 2024-11-11 ENCOUNTER — OFFICE VISIT (OUTPATIENT)
Dept: FAMILY MEDICINE CLINIC | Facility: CLINIC | Age: 34
End: 2024-11-11
Payer: COMMERCIAL

## 2024-11-11 ENCOUNTER — LAB (OUTPATIENT)
Dept: LAB | Facility: HOSPITAL | Age: 34
End: 2024-11-11
Payer: COMMERCIAL

## 2024-11-11 VITALS
HEART RATE: 82 BPM | RESPIRATION RATE: 20 BRPM | DIASTOLIC BLOOD PRESSURE: 84 MMHG | BODY MASS INDEX: 42.66 KG/M2 | WEIGHT: 315 LBS | HEIGHT: 72 IN | SYSTOLIC BLOOD PRESSURE: 135 MMHG | TEMPERATURE: 98.4 F

## 2024-11-11 DIAGNOSIS — R31.29 OTHER MICROSCOPIC HEMATURIA: ICD-10-CM

## 2024-11-11 DIAGNOSIS — E66.01 MORBID OBESITY: ICD-10-CM

## 2024-11-11 DIAGNOSIS — R22.31 AXILLARY MASS, RIGHT: ICD-10-CM

## 2024-11-11 DIAGNOSIS — R05.2 SUBACUTE COUGH: ICD-10-CM

## 2024-11-11 DIAGNOSIS — L40.9 PSORIASIS: ICD-10-CM

## 2024-11-11 DIAGNOSIS — E78.00 HYPERCHOLESTEREMIA: Primary | ICD-10-CM

## 2024-11-11 DIAGNOSIS — Z00.00 WELL ADULT EXAM: Primary | ICD-10-CM

## 2024-11-11 DIAGNOSIS — Z00.00 WELL ADULT EXAM: ICD-10-CM

## 2024-11-11 LAB
ALBUMIN SERPL-MCNC: 4.6 G/DL (ref 3.5–5)
ALBUMIN/GLOB SERPL: 1.4 G/DL (ref 1.1–2.5)
ALP SERPL-CCNC: 69 U/L (ref 24–120)
ALT SERPL W P-5'-P-CCNC: 53 U/L (ref 0–50)
ANION GAP SERPL CALCULATED.3IONS-SCNC: 8 MMOL/L (ref 4–13)
AST SERPL-CCNC: 37 U/L (ref 7–45)
AUTO MIXED CELLS #: 0.6 10*3/MM3 (ref 0.1–2.6)
AUTO MIXED CELLS %: 6.4 % (ref 0.1–24)
BACTERIA UR QL AUTO: NORMAL /HPF
BILIRUB SERPL-MCNC: 0.5 MG/DL (ref 0.1–1)
BILIRUB UR QL STRIP: NEGATIVE
BUN SERPL-MCNC: 12 MG/DL (ref 5–21)
BUN/CREAT SERPL: 15
CALCIUM SPEC-SCNC: 9.5 MG/DL (ref 8.6–10.5)
CHLORIDE SERPL-SCNC: 102 MMOL/L (ref 98–110)
CHOLEST SERPL-MCNC: 182 MG/DL (ref 130–200)
CLARITY UR: CLEAR
CO2 SERPL-SCNC: 28 MMOL/L (ref 24–31)
COLOR UR: YELLOW
CREAT SERPL-MCNC: 0.8 MG/DL (ref 0.5–1.4)
EGFRCR SERPLBLD CKD-EPI 2021: 119.1 ML/MIN/1.73
ERYTHROCYTE [DISTWIDTH] IN BLOOD BY AUTOMATED COUNT: 12.4 % (ref 12.3–15.4)
GLOBULIN UR ELPH-MCNC: 3.2 GM/DL
GLUCOSE SERPL-MCNC: 88 MG/DL (ref 70–100)
GLUCOSE UR STRIP-MCNC: NEGATIVE MG/DL
HBA1C MFR BLD: 5.7 % (ref 4.8–5.9)
HCT VFR BLD AUTO: 45.2 % (ref 37.5–51)
HDLC SERPL-MCNC: 36 MG/DL
HGB BLD-MCNC: 15.2 G/DL (ref 13–17.7)
HGB UR QL STRIP.AUTO: ABNORMAL
HYALINE CASTS UR QL AUTO: NORMAL /LPF
KETONES UR QL STRIP: NEGATIVE
LDLC SERPL CALC-MCNC: 100 MG/DL (ref 0–99)
LDLC/HDLC SERPL: 2.55 {RATIO}
LEUKOCYTE ESTERASE UR QL STRIP.AUTO: NEGATIVE
LYMPHOCYTES # BLD AUTO: 3.6 10*3/MM3 (ref 0.7–3.1)
LYMPHOCYTES NFR BLD AUTO: 37.5 % (ref 19.6–45.3)
MCH RBC QN AUTO: 29.3 PG (ref 26.6–33)
MCHC RBC AUTO-ENTMCNC: 33.6 G/DL (ref 31.5–35.7)
MCV RBC AUTO: 87.3 FL (ref 79–97)
NEUTROPHILS NFR BLD AUTO: 5.5 10*3/MM3 (ref 1.7–7)
NEUTROPHILS NFR BLD AUTO: 56.1 % (ref 42.7–76)
NITRITE UR QL STRIP: NEGATIVE
PH UR STRIP.AUTO: 6.5 [PH] (ref 5–8)
PLATELET # BLD AUTO: 357 10*3/MM3 (ref 140–450)
PMV BLD AUTO: 9 FL (ref 6–12)
POTASSIUM SERPL-SCNC: 4 MMOL/L (ref 3.5–5.3)
PROT SERPL-MCNC: 7.8 G/DL (ref 6.3–8.7)
PROT UR QL STRIP: NEGATIVE
RBC # BLD AUTO: 5.18 10*6/MM3 (ref 4.14–5.8)
RBC # UR STRIP: NORMAL /HPF
REF LAB TEST METHOD: NORMAL
SODIUM SERPL-SCNC: 138 MMOL/L (ref 135–145)
SP GR UR STRIP: 1.02 (ref 1–1.03)
SQUAMOUS #/AREA URNS HPF: NORMAL /HPF
TRIGL SERPL-MCNC: 271 MG/DL (ref 0–149)
UROBILINOGEN UR QL STRIP: ABNORMAL
VLDLC SERPL-MCNC: 46 MG/DL (ref 5–40)
WBC # UR STRIP: NORMAL /HPF
WBC NRBC COR # BLD AUTO: 9.7 10*3/MM3 (ref 3.4–10.8)

## 2024-11-11 PROCEDURE — 83036 HEMOGLOBIN GLYCOSYLATED A1C: CPT

## 2024-11-11 PROCEDURE — 81001 URINALYSIS AUTO W/SCOPE: CPT

## 2024-11-11 PROCEDURE — 80061 LIPID PANEL: CPT

## 2024-11-11 PROCEDURE — 99395 PREV VISIT EST AGE 18-39: CPT | Performed by: PEDIATRICS

## 2024-11-11 PROCEDURE — 2014F MENTAL STATUS ASSESS: CPT | Performed by: PEDIATRICS

## 2024-11-11 PROCEDURE — 85025 COMPLETE CBC W/AUTO DIFF WBC: CPT

## 2024-11-11 PROCEDURE — 36415 COLL VENOUS BLD VENIPUNCTURE: CPT

## 2024-11-11 PROCEDURE — 80053 COMPREHEN METABOLIC PANEL: CPT

## 2024-11-11 NOTE — ASSESSMENT & PLAN NOTE
Patient's (Body mass index is 46.52 kg/m².) indicates that they are morbidly/severely obese (BMI > 40 or > 35 with obesity - related health condition) with health conditions that include none . Weight is worsening. BMI  is above average; BMI management plan is completed. We discussed low calorie, low carb based diet program, portion control, increasing exercise, and Information on healthy weight added to patient's after visit summary.

## 2024-11-11 NOTE — PROGRESS NOTES
"Chief Complaint  Annual Exam    Subjective    History of Present Illness      Patient presents to [facility] for   History of Present Illness  Pt here for annual physical. Pt also c/o cough that started approx 2 weeks ago. Reports diarrhea as well. Also requesting letter to be able to donate plasma.       Review of Systems    I have reviewed and agree with the HPI information as above.  Guevara Morales MD     Objective   Vital Signs:   /84   Pulse 82   Temp 98.4 °F (36.9 °C) (Temporal)   Resp 20   Ht 182.9 cm (72\")   Wt (!) 156 kg (343 lb)   BMI 46.52 kg/m²     Class 3 Severe Obesity (BMI >=40). Obesity-related health conditions include the following: none. Obesity is unchanged. BMI is is above average; BMI management plan is completed. We discussed low calorie, low carb based diet program, portion control, increasing exercise, and Information on healthy weight added to patient's after visit summary.      Physical Exam  Constitutional:       Appearance: Normal appearance. He is normal weight.   HENT:      Nose: Congestion and rhinorrhea present.   Cardiovascular:      Rate and Rhythm: Normal rate and regular rhythm.      Heart sounds: Normal heart sounds.   Pulmonary:      Effort: Pulmonary effort is normal.      Breath sounds: Normal breath sounds.   Skin:     Findings: Rash present. Rash is crusting, papular and scaling.      Comments: Psoriatic rash   Neurological:      Mental Status: He is alert.   Psychiatric:         Mood and Affect: Mood normal.         Behavior: Behavior normal.          REE-7:      PHQ-2 Depression Screening    Little interest or pleasure in doing things? Not at all   Feeling down, depressed, or hopeless? Not at all   PHQ-2 Total Score 0      PHQ-9 Depression Screening  Little interest or pleasure in doing things? Not at all   Feeling down, depressed, or hopeless? Not at all   PHQ-2 Total Score 0   Trouble falling or staying asleep, or sleeping too " much?     Feeling tired or having little energy?     Poor appetite or overeating?     Feeling bad about yourself - or that you are a failure or have let yourself or your family down?     Trouble concentrating on things, such as reading the newspaper or watching television?     Moving or speaking so slowly that other people could have noticed? Or the opposite - being so fidgety or restless that you have been moving around a lot more than usual?     Thoughts that you would be better off dead, or of hurting yourself in some way?     PHQ-9 Total Score     If you checked off any problems, how difficult have these problems made it for you to do your work, take care of things at home, or get along with other people? Not difficult at all           Result Review  Data Reviewed:                   Assessment and Plan      Diagnoses and all orders for this visit:    1. Well adult exam (Primary)  Assessment & Plan:  Discussed healthy nutrition and lifestyle.   Safety in vehicle no texting while driving    Orders:  -     CBC Auto Differential; Future  -     Comprehensive Metabolic Panel; Future  -     Lipid Panel; Future  -     Urinalysis With Culture If Indicated - Urine, Clean Catch; Future    2. Axillary mass, right  Assessment & Plan:  Has 0.75 cm firm cystic mass that he can express sebaceous material    will refer to gen surgery    Orders:  -     Ambulatory Referral to General Surgery    3. Subacute cough    4. Morbid obesity  Assessment & Plan:  Patient's (Body mass index is 46.52 kg/m².) indicates that they are morbidly/severely obese (BMI > 40 or > 35 with obesity - related health condition) with health conditions that include none . Weight is worsening. BMI  is above average; BMI management plan is completed. We discussed low calorie, low carb based diet program, portion control, increasing exercise, and Information on healthy weight added to patient's after visit summary.     Orders:  -     Hemoglobin A1c; Future    5.  Psoriasis  Assessment & Plan:  Shows up in 30s     wishes referral    Orders:  -     Ambulatory Referral to Dermatology            Follow Up   Return in about 4 weeks (around 12/9/2024) for Recheck.  Patient was given instructions and counseling regarding his condition or for health maintenance advice. Please see specific information pulled into the AVS if appropriate.

## 2024-11-11 NOTE — TELEPHONE ENCOUNTER
----- Message from Guevara Morales sent at 11/11/2024  2:08 PM CST -----  He seems to have slipped a small amount in his BAL cholesterol levels as well as his fatty liver is starting to get irritated again sewed back on significant low carbohydrate diet and lets recheck in 3 months

## 2024-11-14 ENCOUNTER — TELEPHONE (OUTPATIENT)
Dept: SURGERY | Facility: CLINIC | Age: 34
End: 2024-11-14
Payer: COMMERCIAL

## 2024-11-14 NOTE — TELEPHONE ENCOUNTER
I called patient because I saw they were a new patient and have some history to go over with them.     Have you had any prior imaging?    Have you had any prior surgery on what you're being seen for?     Any other pertinent information we need to know prior to your visit?     Left message for patient to ask questions.

## 2024-11-18 ENCOUNTER — OFFICE VISIT (OUTPATIENT)
Dept: SURGERY | Facility: CLINIC | Age: 34
End: 2024-11-18
Payer: COMMERCIAL

## 2024-11-18 ENCOUNTER — PATIENT ROUNDING (BHMG ONLY) (OUTPATIENT)
Dept: SURGERY | Facility: CLINIC | Age: 34
End: 2024-11-18
Payer: COMMERCIAL

## 2024-11-18 VITALS
HEIGHT: 72 IN | BODY MASS INDEX: 42.66 KG/M2 | DIASTOLIC BLOOD PRESSURE: 98 MMHG | WEIGHT: 315 LBS | SYSTOLIC BLOOD PRESSURE: 140 MMHG

## 2024-11-18 DIAGNOSIS — L72.3 SEBACEOUS CYST: Primary | ICD-10-CM

## 2024-11-18 PROCEDURE — 1160F RVW MEDS BY RX/DR IN RCRD: CPT

## 2024-11-18 PROCEDURE — 1159F MED LIST DOCD IN RCRD: CPT

## 2024-11-18 PROCEDURE — 99203 OFFICE O/P NEW LOW 30 MIN: CPT

## 2024-11-18 NOTE — PROGRESS NOTES
Office New Patient History and Physical:     Referring Provider: Guevara Morales MD    No chief complaint on file.      Subjective .     History of present illness:  Nam Lee is a 34 y.o. male who presents to the clinic for evaluation of a right axillary cyst. He states that it has been present for multiple months, potentially 6 months. It has never required antibiotics. He states that there can be material expressed almost daily. He states that it has a yellow tint and bad smell to it when he is able to express something out of it. He has never had anything like this before.     BMI is 46.52. He is a nonsmoker. He does not take any blood thinners.     Review of Systems    Review of Systems - General ROS: negative  ENT ROS: negative  Respiratory ROS: no cough, shortness of breath, or wheezing  Cardiovascular ROS: no chest pain or dyspnea on exertion  Gastrointestinal ROS: no abdominal pain, change in bowel habits, or black or bloody stools  Genito-Urinary ROS: no dysuria, trouble voiding, or hematuria  Dermatological ROS: positive for right axillary sebaceous cyst    Breast ROS: negative for breast lumps  Hematological and Lymphatic ROS: negative  Musculoskeletal ROS: negative   Neurological ROS: no TIA or stroke symptoms    Psychological ROS: negative  Endocrine ROS: negative    History  Past Medical History:   Diagnosis Date    Seizures     Sleep apnea    ,   Past Surgical History:   Procedure Laterality Date    ADENOIDECTOMY      TONSILLECTOMY     ,   Family History   Problem Relation Age of Onset    Hypertension Father     Thyroid disease Father     Diabetes Maternal Uncle    ,   Social History     Tobacco Use    Smoking status: Never    Smokeless tobacco: Never   Vaping Use    Vaping status: Never Used   Substance Use Topics    Alcohol use: No    Drug use: Never   , (Not in a hospital admission)   and Allergies:  Levetiracetam    Current Outpatient Medications:     escitalopram (LEXAPRO) 5 MG tablet, ,  "Disp: , Rfl:     hydrOXYzine (ATARAX) 25 MG tablet, , Disp: , Rfl:     lamoTRIgine (LaMICtal) 25 MG tablet, , Disp: , Rfl:     traZODone (DESYREL) 50 MG tablet, , Disp: , Rfl:     vitamin D (ERGOCALCIFEROL) 1.25 MG (97664 UT) capsule capsule, , Disp: , Rfl:     Objective     Vital Signs   /98   Ht 182.9 cm (72\")   Wt (!) 156 kg (343 lb)   BMI 46.52 kg/m²      Physical Exam:  General appearance - alert, well appearing, and in no distress  Mental status - normal mood, behavior, speech, dress, motor activity, and thought processes  Eyes - sclera anicteric  Neck - supple, no significant adenopathy  Chest - no tachypnea, retractions or cyanosis  Heart - normal rate and regular rhythm  Neurological - alert, oriented, normal speech, no focal findings or movement disorder noted  Extremities - no pedal edema noted  Skin - LESIONS NOTED: sebaceous cyst on the right anterior axilla approximately 1.5 cm in size, no overlying erythema, no drainage present, no tenderness upon palpation    Results Review:  Result Review :            Assessment & Plan       Diagnoses and all orders for this visit:    1. Sebaceous cyst (Primary)    Mr. Lee is a 34-year-old male who presents to the clinic for evaluation of a sebaceous cyst of the right axilla.  It has not ever been infected before but due to the location and patient's occupation, patient is concerned about possible infection and risk of recurrence of infection and would like to have the area removed.  Due to the size, patient is amenable to excision in the office.  I discussed the procedure with the patient and he would like to proceed.  Patient will return to clinic on 12/2/2024 for an office removal of said sebaceous cyst.  I instructed him to call for an appointment if he has any new problems or concerns.  He voices understanding and is agreeable to the plan.    Follow up:     Return in about 2 weeks (around 12/2/2024) for IOP at 3:30pm on 12/2/24.        Yvrose " CARLYN Marcos  11/18/24  16:43 CST

## 2024-12-09 ENCOUNTER — PROCEDURE VISIT (OUTPATIENT)
Dept: SURGERY | Facility: CLINIC | Age: 34
End: 2024-12-09
Payer: COMMERCIAL

## 2024-12-09 ENCOUNTER — LAB (OUTPATIENT)
Dept: LAB | Facility: HOSPITAL | Age: 34
End: 2024-12-09
Payer: COMMERCIAL

## 2024-12-09 ENCOUNTER — OFFICE VISIT (OUTPATIENT)
Dept: FAMILY MEDICINE CLINIC | Facility: CLINIC | Age: 34
End: 2024-12-09
Payer: COMMERCIAL

## 2024-12-09 ENCOUNTER — TELEPHONE (OUTPATIENT)
Dept: FAMILY MEDICINE CLINIC | Facility: CLINIC | Age: 34
End: 2024-12-09
Payer: COMMERCIAL

## 2024-12-09 VITALS
HEIGHT: 72 IN | SYSTOLIC BLOOD PRESSURE: 130 MMHG | WEIGHT: 315 LBS | BODY MASS INDEX: 42.66 KG/M2 | DIASTOLIC BLOOD PRESSURE: 73 MMHG

## 2024-12-09 VITALS
HEART RATE: 87 BPM | SYSTOLIC BLOOD PRESSURE: 134 MMHG | BODY MASS INDEX: 42.66 KG/M2 | RESPIRATION RATE: 20 BRPM | HEIGHT: 72 IN | WEIGHT: 315 LBS | OXYGEN SATURATION: 97 % | DIASTOLIC BLOOD PRESSURE: 93 MMHG | TEMPERATURE: 96.9 F

## 2024-12-09 DIAGNOSIS — E66.813 CLASS 3 SEVERE OBESITY DUE TO EXCESS CALORIES WITHOUT SERIOUS COMORBIDITY WITH BODY MASS INDEX (BMI) OF 45.0 TO 49.9 IN ADULT: ICD-10-CM

## 2024-12-09 DIAGNOSIS — E78.00 HYPERCHOLESTEREMIA: ICD-10-CM

## 2024-12-09 DIAGNOSIS — R19.7 DIARRHEA, UNSPECIFIED TYPE: ICD-10-CM

## 2024-12-09 DIAGNOSIS — R31.29 OTHER MICROSCOPIC HEMATURIA: ICD-10-CM

## 2024-12-09 DIAGNOSIS — R53.83 FATIGUE, UNSPECIFIED TYPE: ICD-10-CM

## 2024-12-09 DIAGNOSIS — L72.3 SEBACEOUS CYST: Primary | ICD-10-CM

## 2024-12-09 DIAGNOSIS — E66.01 CLASS 3 SEVERE OBESITY DUE TO EXCESS CALORIES WITHOUT SERIOUS COMORBIDITY WITH BODY MASS INDEX (BMI) OF 45.0 TO 49.9 IN ADULT: ICD-10-CM

## 2024-12-09 DIAGNOSIS — K52.9 GASTROENTERITIS: Primary | ICD-10-CM

## 2024-12-09 LAB
BILIRUB UR QL STRIP: NEGATIVE
CHOLEST SERPL-MCNC: 168 MG/DL (ref 130–200)
CLARITY UR: CLEAR
COLOR UR: YELLOW
GLUCOSE UR STRIP-MCNC: NEGATIVE MG/DL
HDLC SERPL-MCNC: 33 MG/DL
HGB UR QL STRIP.AUTO: NEGATIVE
KETONES UR QL STRIP: NEGATIVE
LDLC SERPL CALC-MCNC: 107 MG/DL (ref 0–99)
LDLC/HDLC SERPL: 3.13 {RATIO}
LEUKOCYTE ESTERASE UR QL STRIP.AUTO: NEGATIVE
NITRITE UR QL STRIP: NEGATIVE
PH UR STRIP.AUTO: 5.5 [PH] (ref 5–8)
PROT UR QL STRIP: NEGATIVE
SP GR UR STRIP: >=1.03 (ref 1–1.03)
TRIGL SERPL-MCNC: 158 MG/DL (ref 0–149)
UROBILINOGEN UR QL STRIP: NORMAL
VLDLC SERPL-MCNC: 28 MG/DL (ref 5–40)

## 2024-12-09 PROCEDURE — 1160F RVW MEDS BY RX/DR IN RCRD: CPT

## 2024-12-09 PROCEDURE — 36415 COLL VENOUS BLD VENIPUNCTURE: CPT

## 2024-12-09 PROCEDURE — 81003 URINALYSIS AUTO W/O SCOPE: CPT

## 2024-12-09 PROCEDURE — 99213 OFFICE O/P EST LOW 20 MIN: CPT

## 2024-12-09 PROCEDURE — 80061 LIPID PANEL: CPT

## 2024-12-09 PROCEDURE — 1159F MED LIST DOCD IN RCRD: CPT

## 2024-12-09 PROCEDURE — 88304 TISSUE EXAM BY PATHOLOGIST: CPT

## 2024-12-09 PROCEDURE — 11402 EXC TR-EXT B9+MARG 1.1-2 CM: CPT

## 2024-12-09 NOTE — TELEPHONE ENCOUNTER
----- Message from Guevara Morales sent at 12/9/2024  1:14 PM CST -----  Triglycerides are continuing to improve almost to normal now continue diet lets recheck in 3 months

## 2024-12-09 NOTE — PROGRESS NOTES
"Chief Complaint  Fatigue, Diarrhea, Nausea, and elevated pulse rate    Subjective    History of Present Illness      Patient presents to [facility] for   History of Present Illness  Pt is here today c/o diarrhea, nausea, elevated pulse and cold chills.  Pt reports he started feeling bad last Wednesday but is feeling better now but still having some fatigue and diarrhea.       Review of Systems   All other systems reviewed and are negative.      I have reviewed and agree with the HPI and ROS information as above.  ARMANDO Kim     Objective   Vital Signs:   /93   Pulse 87   Temp 96.9 °F (36.1 °C) (Temporal)   Resp 20   Ht 182.9 cm (72\")   Wt (!) 154 kg (339 lb)   SpO2 97%   BMI 45.98 kg/m²            Physical Exam  Constitutional:       Appearance: Normal appearance. He is well-developed. He is obese.   HENT:      Head: Normocephalic and atraumatic.      Right Ear: Tympanic membrane, ear canal and external ear normal.      Left Ear: Tympanic membrane, ear canal and external ear normal.      Nose: Nose normal. No septal deviation, nasal tenderness or congestion.      Mouth/Throat:      Lips: Pink. No lesions.      Mouth: Mucous membranes are moist. No oral lesions.      Dentition: Normal dentition.      Pharynx: Oropharynx is clear. No pharyngeal swelling, oropharyngeal exudate or posterior oropharyngeal erythema.   Eyes:      General: Lids are normal. Vision grossly intact. No scleral icterus.        Right eye: No discharge.         Left eye: No discharge.      Extraocular Movements: Extraocular movements intact.      Conjunctiva/sclera: Conjunctivae normal.      Right eye: Right conjunctiva is not injected.      Left eye: Left conjunctiva is not injected.      Pupils: Pupils are equal, round, and reactive to light.   Neck:      Thyroid: No thyroid mass.      Trachea: Trachea normal.   Cardiovascular:      Rate and Rhythm: Normal rate and regular rhythm.      Heart " sounds: Normal heart sounds. No murmur heard.     No gallop.   Pulmonary:      Effort: Pulmonary effort is normal.      Breath sounds: Normal breath sounds and air entry. No wheezing, rhonchi or rales.   Abdominal:      General: There is no distension.      Palpations: Abdomen is soft. There is no mass.      Tenderness: There is no abdominal tenderness. There is no right CVA tenderness, left CVA tenderness, guarding or rebound.   Musculoskeletal:         General: No tenderness or deformity. Normal range of motion.      Cervical back: Full passive range of motion without pain, normal range of motion and neck supple.      Thoracic back: Normal.      Right lower leg: No edema.      Left lower leg: No edema.   Skin:     General: Skin is warm and dry.      Coloration: Skin is not jaundiced.      Findings: No rash.   Neurological:      Mental Status: He is alert and oriented to person, place, and time.      Sensory: Sensation is intact.      Motor: Motor function is intact.      Coordination: Coordination is intact.      Gait: Gait is intact.      Deep Tendon Reflexes: Reflexes are normal and symmetric.   Psychiatric:         Mood and Affect: Mood and affect normal.         Judgment: Judgment normal.          REE-7:      PHQ-2 Depression Screening    Little interest or pleasure in doing things?     Feeling down, depressed, or hopeless?     PHQ-2 Total Score        PHQ-9 Depression Screening  Little interest or pleasure in doing things?     Feeling down, depressed, or hopeless?     PHQ-2 Total Score     Trouble falling or staying asleep, or sleeping too much?     Feeling tired or having little energy?     Poor appetite or overeating?     Feeling bad about yourself - or that you are a failure or have let yourself or your family down?     Trouble concentrating on things, such as reading the newspaper or watching television?     Moving or speaking so slowly that other people could have noticed? Or the opposite - being so  fidgety or restless that you have been moving around a lot more than usual?     Thoughts that you would be better off dead, or of hurting yourself in some way?     PHQ-9 Total Score     If you checked off any problems, how difficult have these problems made it for you to do your work, take care of things at home, or get along with other people?             Result Review  Data Reviewed:                   Assessment and Plan      Diagnoses and all orders for this visit:    1. Gastroenteritis (Primary)    2. Diarrhea, unspecified type    3. Fatigue, unspecified type    4. Class 3 severe obesity due to excess calories without serious comorbidity with body mass index (BMI) of 45.0 to 49.9 in adult      Patient is seen today due to having a previous virus.  He states that last Wednesday he started having diarrhea, abdominal pain and fatigue.  He states that his daughters did have the same symptoms as well.  They feel that they had a stomach bug.  He states now he is feeling better but has some fatigue.  Patient denies any vomiting, diarrhea in the last 24 hours.  Denies any abdominal pain or fever.  I discussed with him that he will need to increase his hydration in order to help with his fatigue and bouncing back from his virus.    Patient is also due to have his cholesterol rechecked.  Discussed with this him and he will go next-door to have this done.  Patient educated to return if any new or worsening symptoms.          Follow Up   Return if symptoms worsen or fail to improve.  Patient was given instructions and counseling regarding his condition or for health maintenance advice. Please see specific information pulled into the AVS if appropriate.

## 2024-12-09 NOTE — PROGRESS NOTES
Patient: Nam Lee    YOB: 1990    Date: 12/09/2024    Primary Care Provider: Guevara Morales MD    Procedure:  Nam Lee presents for excision of sebaceous cyst of right axilla    The risks, benefits, complications, and possible alternatives of the above procedure were discussed with the patient who agreed to proceed.     Findings: 1.1 cm sebaceous cyst of right axilla       Procedures    After discussion of risks (including, but not limited to bleeding, infection, and damage to surrounding structures) and benefits, the patient would like to proceed with excision of the sebaceous cyst of the right axilla. Consent was obtained. A timeout was performed. The area was prepped and draped in sterile fashion. 7 ccs of lidocaine was used to anesthetize the area. A 1.3 cm by 3 cm elliptical incision was made around the cyst. The cyst was excised in its entirety. Hemostasis was obtained. The area was closed with 3-0 vicryl interrupted sutures and a 4-0 Monocryl running subcuticular suture. Glue was placed over the top of the incision. The patient tolerated the procedure well and no complications were apparent.      ASSESSMENT/PLAN:    Diagnoses and all orders for this visit:    1. Sebaceous cyst (Primary)  -     Cancel: Tissue Pathology Exam; Future  -     Tissue Pathology Exam; Future  -     Tissue Pathology Exam    Mr. Lee is a 34 year old who presents to the clinic for excision of a sebaceous cyst of the right axilla. He underwent said procedure with no apparent complications. He was given post operative instructions including no lifting greater than 25 pounds with his right arm and no soaking underwater for 2 weeks in order to give his incision time to heal. He is to return to clinic in 2 weeks for a post op visit. He is to call for an appointment if he has any new problems or concerns in the interim. He voices understanding and is agreeable to the plan.     FOLLOW UP:     Return in about 2  weeks (around 12/23/2024) for 2 week post op IOP.        Electronically signed by Yvrose Marcos PA-C  12/09/24  17:51 CST

## 2024-12-10 LAB
CYTO UR: NORMAL
LAB AP CASE REPORT: NORMAL
Lab: NORMAL
PATH REPORT.FINAL DX SPEC: NORMAL
PATH REPORT.GROSS SPEC: NORMAL

## 2024-12-11 ENCOUNTER — TELEPHONE (OUTPATIENT)
Dept: SURGERY | Facility: CLINIC | Age: 34
End: 2024-12-11
Payer: COMMERCIAL

## 2024-12-11 ENCOUNTER — TELEPHONE (OUTPATIENT)
Dept: FAMILY MEDICINE CLINIC | Facility: CLINIC | Age: 34
End: 2024-12-11
Payer: COMMERCIAL

## 2024-12-11 NOTE — PROGRESS NOTES
Patient called and made aware of results. He states that he is doing well. Patient has follow up on 12/23 for incision check.

## 2024-12-11 NOTE — TELEPHONE ENCOUNTER
Called the patient to let him know the pathology results. It was a benign cyst. Pt understood. Pt states he is doing okay, no problems.

## 2024-12-23 ENCOUNTER — TELEPHONE (OUTPATIENT)
Dept: SURGERY | Facility: CLINIC | Age: 34
End: 2024-12-23

## 2024-12-23 NOTE — TELEPHONE ENCOUNTER
Caller: Nam Lee    Relationship:  Self    Best call back number: [phone] (home)       PATIENT CALLED REQUESTING TO CANCEL SAME DAY APPT.    Did the patient call AFTER the start time of their scheduled appointment?  []YES  [x]NO    Was the patient's appointment rescheduled? []YES  [x]NO    Any additional information: PATIENT UNABLE TO KEEP FOLLOW UP APPT. UNABLE TO WARM TRANSFER FOR RESCHEDULING. PT STATES THAT HIS AUNT IS A NURSE WHO CHECKED WOUND AND SAYS ITS HEALING WELL. PLEASE ADVISE

## 2025-01-08 ENCOUNTER — OFFICE VISIT (OUTPATIENT)
Dept: SURGERY | Facility: CLINIC | Age: 35
End: 2025-01-08
Payer: COMMERCIAL

## 2025-01-08 VITALS
BODY MASS INDEX: 42.66 KG/M2 | HEIGHT: 72 IN | DIASTOLIC BLOOD PRESSURE: 91 MMHG | WEIGHT: 315 LBS | SYSTOLIC BLOOD PRESSURE: 136 MMHG

## 2025-01-08 DIAGNOSIS — L73.2 HIDRADENITIS: Primary | ICD-10-CM

## 2025-01-08 PROCEDURE — 1160F RVW MEDS BY RX/DR IN RCRD: CPT

## 2025-01-08 PROCEDURE — 99024 POSTOP FOLLOW-UP VISIT: CPT

## 2025-01-08 PROCEDURE — 1159F MED LIST DOCD IN RCRD: CPT

## 2025-01-08 NOTE — PROGRESS NOTES
"Patient: Nam Lee    YOB: 1990    Date: 01/08/2025    Primary Care Provider: Guevara Morales MD    Vital Signs:   Vitals:    01/08/25 0834   BP: 136/91   Weight: (!) 154 kg (339 lb)   Height: 182.9 cm (72\")       The patient is tolerating a regular diet and has no complaints s/p excision of a dilated pore/hidradenitis on his right axilla on 12/9/24. The patient denies fevers, chills, nausea, vomiting, and excessive pain. The incision is healing well without signs of infection or wound dehiscence.     Results Review:   I reviewed the patient's new clinical results.    Tissue Pathology Exam (12/09/2024 11:52)   -   Dilated pore (cannot completely exclude limited hidradenitis).          Assessment / Plan:    Diagnoses and all orders for this visit:    1. Hidradenitis (Primary)        Nam Lee is a 34 y.o. male who presents to the clinic 4 weeks s/p dilated pore/hidradenitis excision. He is overall doing well at this time. The incision is healing well. At this time, he will follow up in office as needed. I instructed the patient to call for an appointment if he has any new problems or concerns. He voiced understanding and is agreeable to the plan.     Follow up:     Return if symptoms worsen or fail to improve.        Electronically signed by Yvrose Marcos PA-C  01/08/25  10:26 CST                   "